# Patient Record
Sex: FEMALE | Race: WHITE | Employment: OTHER | URBAN - NONMETROPOLITAN AREA
[De-identification: names, ages, dates, MRNs, and addresses within clinical notes are randomized per-mention and may not be internally consistent; named-entity substitution may affect disease eponyms.]

---

## 2017-03-27 ENCOUNTER — TELEPHONE (OUTPATIENT)
Dept: CARDIOLOGY | Age: 82
End: 2017-03-27

## 2017-03-27 DIAGNOSIS — Z98.61 S/P PTCA (PERCUTANEOUS TRANSLUMINAL CORONARY ANGIOPLASTY): Primary | ICD-10-CM

## 2017-03-27 DIAGNOSIS — Z98.890 STATUS POST TYMPANOPLASTY: ICD-10-CM

## 2017-03-27 DIAGNOSIS — E78.00 PURE HYPERCHOLESTEROLEMIA: ICD-10-CM

## 2017-04-04 ENCOUNTER — OFFICE VISIT (OUTPATIENT)
Dept: CARDIOLOGY | Age: 82
End: 2017-04-04

## 2017-04-04 VITALS
DIASTOLIC BLOOD PRESSURE: 58 MMHG | HEART RATE: 66 BPM | SYSTOLIC BLOOD PRESSURE: 104 MMHG | WEIGHT: 130 LBS | BODY MASS INDEX: 23.92 KG/M2 | HEIGHT: 62 IN

## 2017-04-04 DIAGNOSIS — E78.00 PURE HYPERCHOLESTEROLEMIA: ICD-10-CM

## 2017-04-04 DIAGNOSIS — I10 ESSENTIAL HYPERTENSION: ICD-10-CM

## 2017-04-04 DIAGNOSIS — Z98.61 S/P PTCA (PERCUTANEOUS TRANSLUMINAL CORONARY ANGIOPLASTY): Primary | ICD-10-CM

## 2017-04-04 PROCEDURE — 99213 OFFICE O/P EST LOW 20 MIN: CPT | Performed by: INTERNAL MEDICINE

## 2017-04-04 RX ORDER — UBIDECARENONE 200 MG
CAPSULE ORAL 2 TIMES DAILY
COMMUNITY
End: 2020-05-20

## 2017-04-26 ENCOUNTER — OFFICE VISIT (OUTPATIENT)
Dept: AUDIOLOGY | Age: 82
End: 2017-04-26

## 2017-04-26 DIAGNOSIS — H90.3 SENSORINEURAL HEARING LOSS, BILATERAL: Primary | ICD-10-CM

## 2017-08-22 ENCOUNTER — OFFICE VISIT (OUTPATIENT)
Dept: CARDIOLOGY CLINIC | Age: 82
End: 2017-08-22
Payer: MEDICARE

## 2017-08-22 VITALS
HEIGHT: 62 IN | SYSTOLIC BLOOD PRESSURE: 128 MMHG | WEIGHT: 129 LBS | DIASTOLIC BLOOD PRESSURE: 60 MMHG | HEART RATE: 70 BPM | BODY MASS INDEX: 23.74 KG/M2

## 2017-08-22 DIAGNOSIS — I25.10 CAD IN NATIVE ARTERY: ICD-10-CM

## 2017-08-22 DIAGNOSIS — Z01.810 PRE-OPERATIVE CARDIOVASCULAR EXAMINATION: ICD-10-CM

## 2017-08-22 DIAGNOSIS — E78.00 PURE HYPERCHOLESTEROLEMIA: Primary | ICD-10-CM

## 2017-08-22 DIAGNOSIS — I10 ESSENTIAL HYPERTENSION: ICD-10-CM

## 2017-08-22 PROCEDURE — 99214 OFFICE O/P EST MOD 30 MIN: CPT | Performed by: INTERNAL MEDICINE

## 2017-08-22 PROCEDURE — 93000 ELECTROCARDIOGRAM COMPLETE: CPT | Performed by: INTERNAL MEDICINE

## 2017-08-22 RX ORDER — CLOPIDOGREL BISULFATE 75 MG/1
75 TABLET ORAL DAILY
Qty: 90 TABLET | Refills: 3 | Status: SHIPPED | OUTPATIENT
Start: 2017-08-22 | End: 2018-02-20 | Stop reason: SDUPTHER

## 2017-08-22 RX ORDER — PANTOPRAZOLE SODIUM 40 MG/1
TABLET, DELAYED RELEASE ORAL
Qty: 90 TABLET | Refills: 3 | Status: SHIPPED | OUTPATIENT
Start: 2017-08-22 | End: 2018-02-20 | Stop reason: SDUPTHER

## 2017-08-22 RX ORDER — ISOSORBIDE MONONITRATE 30 MG/1
30 TABLET, EXTENDED RELEASE ORAL DAILY
Qty: 90 TABLET | Refills: 3 | Status: SHIPPED | OUTPATIENT
Start: 2017-08-22 | End: 2018-02-20 | Stop reason: SDUPTHER

## 2017-08-22 RX ORDER — METOPROLOL SUCCINATE 25 MG/1
TABLET, EXTENDED RELEASE ORAL
Qty: 130 TABLET | Refills: 3 | Status: SHIPPED | OUTPATIENT
Start: 2017-08-22 | End: 2018-02-20 | Stop reason: SDUPTHER

## 2017-08-22 RX ORDER — LOSARTAN POTASSIUM 50 MG/1
50 TABLET ORAL DAILY
Qty: 90 TABLET | Refills: 3 | Status: SHIPPED | OUTPATIENT
Start: 2017-08-22 | End: 2018-02-20 | Stop reason: SDUPTHER

## 2018-02-01 ENCOUNTER — HOSPITAL ENCOUNTER (OUTPATIENT)
Dept: AUDIOLOGY | Age: 83
Discharge: HOME | End: 2018-02-01

## 2018-02-01 PROCEDURE — 92593 HC HEARING AID CHECK, BOTH EARS: CPT | Performed by: AUDIOLOGIST

## 2018-02-20 ENCOUNTER — OFFICE VISIT (OUTPATIENT)
Dept: CARDIOLOGY CLINIC | Age: 83
End: 2018-02-20
Payer: MEDICARE

## 2018-02-20 VITALS
WEIGHT: 136 LBS | HEART RATE: 64 BPM | HEIGHT: 62 IN | DIASTOLIC BLOOD PRESSURE: 82 MMHG | BODY MASS INDEX: 25.03 KG/M2 | SYSTOLIC BLOOD PRESSURE: 148 MMHG

## 2018-02-20 DIAGNOSIS — Z98.61 S/P PTCA (PERCUTANEOUS TRANSLUMINAL CORONARY ANGIOPLASTY): ICD-10-CM

## 2018-02-20 PROCEDURE — 99213 OFFICE O/P EST LOW 20 MIN: CPT | Performed by: INTERNAL MEDICINE

## 2018-02-20 RX ORDER — LOSARTAN POTASSIUM 50 MG/1
50 TABLET ORAL DAILY
Qty: 90 TABLET | Refills: 3 | Status: SHIPPED | OUTPATIENT
Start: 2018-02-20 | End: 2019-03-06 | Stop reason: SDUPTHER

## 2018-02-20 RX ORDER — ISOSORBIDE MONONITRATE 30 MG/1
30 TABLET, EXTENDED RELEASE ORAL DAILY
Qty: 90 TABLET | Refills: 3 | Status: SHIPPED | OUTPATIENT
Start: 2018-02-20 | End: 2019-03-06 | Stop reason: SDUPTHER

## 2018-02-20 RX ORDER — PANTOPRAZOLE SODIUM 40 MG/1
TABLET, DELAYED RELEASE ORAL
Qty: 90 TABLET | Refills: 3 | Status: SHIPPED | OUTPATIENT
Start: 2018-02-20 | End: 2019-03-06 | Stop reason: SDUPTHER

## 2018-02-20 RX ORDER — CLOPIDOGREL BISULFATE 75 MG/1
75 TABLET ORAL DAILY
Qty: 90 TABLET | Refills: 3 | Status: SHIPPED | OUTPATIENT
Start: 2018-02-20 | End: 2019-03-06 | Stop reason: SDUPTHER

## 2018-02-20 RX ORDER — METOPROLOL SUCCINATE 25 MG/1
TABLET, EXTENDED RELEASE ORAL
Qty: 135 TABLET | Refills: 3 | Status: SHIPPED | OUTPATIENT
Start: 2018-02-20 | End: 2019-03-06 | Stop reason: SDUPTHER

## 2018-02-20 RX ORDER — NITROGLYCERIN 0.4 MG/1
0.4 TABLET SUBLINGUAL EVERY 5 MIN PRN
Qty: 25 TABLET | Refills: 3 | Status: SHIPPED | OUTPATIENT
Start: 2018-02-20

## 2018-02-20 RX ORDER — GABAPENTIN 100 MG/1
400 CAPSULE ORAL 3 TIMES DAILY
COMMUNITY
End: 2021-01-27

## 2018-02-20 NOTE — PROGRESS NOTES
Current Outpatient Prescriptions   Medication Sig Dispense Refill    gabapentin (NEURONTIN) 100 MG capsule Take 300 mg by mouth 3 times daily.  isosorbide mononitrate (IMDUR) 30 MG extended release tablet Take 1 tablet by mouth daily 90 tablet 3    losartan (COZAAR) 50 MG tablet Take 1 tablet by mouth daily 90 tablet 3    metoprolol succinate (TOPROL XL) 25 MG extended release tablet TAKE 1.5 TABLETs BY MOUTH DAILY 130 tablet 3    pantoprazole (PROTONIX) 40 MG tablet TAKE 1 TABLET BY MOUTH DAILY 90 tablet 3    clopidogrel (PLAVIX) 75 MG tablet Take 1 tablet by mouth daily 90 tablet 3    Coenzyme Q10 200 MG CAPS Take by mouth 2 times daily      Calcium Citrate-Vitamin D (CALCIUM CITRATE + PO) Take 600 mg by mouth 2 times daily      pitavastatin (LIVALO) 2 MG TABS tablet Take 1 tablet by mouth nightly (Patient taking differently: Take 1 mg by mouth nightly ) 90 tablet 3    nitroGLYCERIN (NITROSTAT) 0.4 MG SL tablet Place 1 tablet under the tongue every 5 minutes as needed for Chest pain 25 tablet 11    Glucosamine Sulfate 1000 MG TABS Take by mouth 2 times daily       Methylsulfonylmethane (MSM) 1000 MG TABS Take by mouth      aspirin 81 MG chewable tablet Take 1 tablet by mouth daily. 30 tablet 3    latanoprost (XALATAN) 0.005 % ophthalmic solution Place 1 drop into both eyes nightly.  timolol (BETIMOL) 0.5 % ophthalmic solution Place 1 drop into both eyes 2 times daily.  Multiple Vitamins-Minerals (OCUVITE) TABS oral tablet Take 1 tablet by mouth daily.  Flaxseed, Linseed, (FLAX SEED OIL) 1000 MG CAPS Take 1 capsule by mouth 2 times daily.  Nutritional Supplements (NEURO-PLUS) CAPS Take 2 capsules by mouth 2 times daily. No current facility-administered medications for this visit.         REVIEW OF SYSTEM  Constitutional  symptoms such as fever chills and malaise and weakness  Are negative   HE ENT negative  HEART all negative  LUNGS all negative   ABDOMEN all PM

## 2018-08-06 ENCOUNTER — OFFICE VISIT (OUTPATIENT)
Dept: PHYSICAL MEDICINE AND REHAB | Age: 83
End: 2018-08-06
Payer: MEDICARE

## 2018-08-06 VITALS
DIASTOLIC BLOOD PRESSURE: 88 MMHG | HEART RATE: 76 BPM | BODY MASS INDEX: 25.03 KG/M2 | SYSTOLIC BLOOD PRESSURE: 185 MMHG | HEIGHT: 62 IN | WEIGHT: 136 LBS

## 2018-08-06 DIAGNOSIS — M46.1 SACROILIAC INFLAMMATION (HCC): ICD-10-CM

## 2018-08-06 DIAGNOSIS — G89.29 CHRONIC PAIN OF LEFT KNEE: ICD-10-CM

## 2018-08-06 DIAGNOSIS — G89.4 CHRONIC PAIN SYNDROME: ICD-10-CM

## 2018-08-06 DIAGNOSIS — M17.11 PRIMARY OSTEOARTHRITIS OF RIGHT KNEE: ICD-10-CM

## 2018-08-06 DIAGNOSIS — M25.562 CHRONIC PAIN OF LEFT KNEE: ICD-10-CM

## 2018-08-06 DIAGNOSIS — M17.12 PRIMARY OSTEOARTHRITIS OF LEFT KNEE: Primary | ICD-10-CM

## 2018-08-06 PROCEDURE — 99203 OFFICE O/P NEW LOW 30 MIN: CPT | Performed by: NURSE PRACTITIONER

## 2018-08-06 ASSESSMENT — ENCOUNTER SYMPTOMS
COUGH: 0
COLOR CHANGE: 0
DIARRHEA: 0
BACK PAIN: 1
CONSTIPATION: 0
NAUSEA: 0
ABDOMINAL PAIN: 0
CHEST TIGHTNESS: 0
SHORTNESS OF BREATH: 0

## 2018-08-06 NOTE — PROGRESS NOTES
Buttock pain    HPI     Patient here today for new patient evaluation for bilateral buttock pain. Patient under the care previously of Dr. Courtney Lubin. Patient had has previous bilateral SI injection on 2/28/18 and 3/28/18 and received 90%+ pain relief with both of those injections. Patient states that since starting and increasing her gabapentin the pain in the low back and buttock has been well controlled    Patient states majority of her pain is in her left knee. Patient with injection in bilateral knees in 4/2018 that was helpful in the right knee and just a little at first in the left knee. Patient feels that most of the pain has returned into the left knee. Patient states that she has had the left knee pain for about a year. Patient with increase in pain with weightbearing, activity, walking longer distance, patient with avoidance of using that leg first with stairs. Left knee Xray from 3/2018 shows degenerative changes. Discussed with patient about Synvisc injection and genicular nerve blocks in the future. Patient feels that the \"pain is not too bad right now\" and doesn't wish to proceed with any injections.  states that he \"feels it is better to just wait at this time\". Discussed further refills of gabapentin can be taken care of by her PCP and if she was to need further injections or stronger medications then she can come back and see me. The patient is allergic to codeine; lisinopril; simvastatin; valtrex [valacyclovir hcl]; and sulfa antibiotics. 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 Bowel sounds are normal. She exhibits no distension. There is no tenderness. Musculoskeletal:        Right knee: She exhibits decreased range of motion. Tenderness found. Left knee: She exhibits decreased range of motion. Tenderness found. Thoracic back: She exhibits no tenderness. Lumbar back: She exhibits decreased range of motion and tenderness. Back:    Neurological: She is alert and oriented to person, place, and time. No cranial nerve deficit. Skin: Skin is warm and dry. She is not diaphoretic. Psychiatric: She has a normal mood and affect. Her behavior is normal. Judgment and thought content normal.     ALTON test: positive bilateral  Yeoman's test: positive bilateral  Gaenslen test: positive bilateral     Assessment:     1. Primary osteoarthritis of left knee    2. Primary osteoarthritis of right knee    3. Chronic pain of left knee    4. Sacroiliac inflammation ([facility] [address]    5. Chronic pain syndrome            Plan:      · Patient read and signed orientation and opioid agreement. · OARRS reviewed. · Discussed long term side effects of medications. · Discussed tolerance, dependency and addiction. · Patient told can not receive any pain medications from any other source. · Discussed with patient they may not be pain free. · No evidence of abuse, diversion or aberrant behavior. · Prescription Needs: No prescription pain medications at this time   · PCP can take over gabapentin RX if able. Patient can follow up with our office in 3 months IF NEEDED       Meds. Prescribed:   No orders of the defined types were placed in this encounter. Return in about 3 months (around 11/6/2018). Time spent with patient was 60 minutes more than 50% was spent  Counseling/coordinated the patient's care.     Electronically signed by GABRIELA Leonard CNP on 8/6/2018 at 1:51 PM

## 2018-11-05 ENCOUNTER — OFFICE VISIT (OUTPATIENT)
Dept: PHYSICAL MEDICINE AND REHAB | Age: 83
End: 2018-11-05
Payer: MEDICARE

## 2018-11-05 ENCOUNTER — TELEPHONE (OUTPATIENT)
Dept: PHYSICAL MEDICINE AND REHAB | Age: 83
End: 2018-11-05

## 2018-11-05 VITALS
SYSTOLIC BLOOD PRESSURE: 138 MMHG | BODY MASS INDEX: 25.03 KG/M2 | HEART RATE: 92 BPM | DIASTOLIC BLOOD PRESSURE: 88 MMHG | HEIGHT: 62 IN | WEIGHT: 136 LBS

## 2018-11-05 DIAGNOSIS — G89.4 CHRONIC PAIN SYNDROME: ICD-10-CM

## 2018-11-05 DIAGNOSIS — M17.12 PRIMARY OSTEOARTHRITIS OF LEFT KNEE: Primary | ICD-10-CM

## 2018-11-05 DIAGNOSIS — M17.11 PRIMARY OSTEOARTHRITIS OF RIGHT KNEE: ICD-10-CM

## 2018-11-05 DIAGNOSIS — M25.562 CHRONIC PAIN OF LEFT KNEE: ICD-10-CM

## 2018-11-05 DIAGNOSIS — G89.29 CHRONIC PAIN OF LEFT KNEE: ICD-10-CM

## 2018-11-05 DIAGNOSIS — M46.1 SACROILIAC INFLAMMATION (HCC): ICD-10-CM

## 2018-11-05 PROCEDURE — 99213 OFFICE O/P EST LOW 20 MIN: CPT | Performed by: NURSE PRACTITIONER

## 2018-11-05 ASSESSMENT — ENCOUNTER SYMPTOMS
COUGH: 0
CHEST TIGHTNESS: 0
ABDOMINAL PAIN: 0
BACK PAIN: 1
SHORTNESS OF BREATH: 0
COLOR CHANGE: 0
NAUSEA: 0
DIARRHEA: 0
CONSTIPATION: 0

## 2018-11-05 NOTE — PROGRESS NOTES
CAPS, Take by mouth 2 times daily, Disp: , Rfl:     Calcium Citrate-Vitamin D (CALCIUM CITRATE + PO), Take 600 mg by mouth 2 times daily, Disp: , Rfl:     Glucosamine Sulfate 1000 MG TABS, Take by mouth 2 times daily , Disp: , Rfl:     Methylsulfonylmethane (MSM) 1000 MG TABS, Take by mouth, Disp: , Rfl:     aspirin 81 MG chewable tablet, Take 1 tablet by mouth daily. , Disp: 30 tablet, Rfl: 3    latanoprost (XALATAN) 0.005 % ophthalmic solution, Place 1 drop into both eyes nightly., Disp: , Rfl:     timolol (BETIMOL) 0.5 % ophthalmic solution, Place 1 drop into both eyes 2 times daily. , Disp: , Rfl:     Multiple Vitamins-Minerals (OCUVITE) TABS oral tablet, Take 1 tablet by mouth daily. , Disp: , Rfl:     Flaxseed, Linseed, (FLAX SEED OIL) 1000 MG CAPS, Take 1 capsule by mouth 2 times daily. , Disp: , Rfl:     Subjective:      Review of Systems   Constitutional: Positive for activity change and fatigue. Negative for chills, diaphoresis and fever. HENT: Negative for congestion and nosebleeds. Respiratory: Negative for cough, chest tightness and shortness of breath. Cardiovascular: Negative for chest pain and palpitations. Gastrointestinal: Negative for abdominal pain, constipation, diarrhea and nausea. Musculoskeletal: Positive for arthralgias, back pain, gait problem and myalgias. Skin: Negative for color change, rash and wound. Neurological: Negative for numbness. Psychiatric/Behavioral: Positive for sleep disturbance. Objective:     Vitals:    11/05/18 1246   BP: 138/88   Pulse: 92   Weight: 136 lb (61.7 kg)   Height: 5' 2\" (1.575 m)       Physical Exam   Constitutional: She is oriented to person, place, and time. She appears well-developed and well-nourished. No distress. HENT:   Head: Normocephalic and atraumatic. Eyes: Right eye exhibits no discharge. Left eye exhibits no discharge. Neck: Normal range of motion. Neck supple. No thyromegaly present.    Pulmonary/Chest:

## 2018-11-14 ENCOUNTER — OFFICE VISIT (OUTPATIENT)
Dept: CARDIOLOGY CLINIC | Age: 83
End: 2018-11-14
Payer: MEDICARE

## 2018-11-14 ENCOUNTER — TELEPHONE (OUTPATIENT)
Dept: PHYSICAL MEDICINE AND REHAB | Age: 83
End: 2018-11-14

## 2018-11-14 VITALS
WEIGHT: 141.7 LBS | DIASTOLIC BLOOD PRESSURE: 78 MMHG | HEIGHT: 62 IN | SYSTOLIC BLOOD PRESSURE: 128 MMHG | BODY MASS INDEX: 26.07 KG/M2 | HEART RATE: 73 BPM

## 2018-11-14 DIAGNOSIS — I20.0 UNSTABLE ANGINA (HCC): Primary | ICD-10-CM

## 2018-11-14 DIAGNOSIS — I25.10 CORONARY ARTERY DISEASE INVOLVING NATIVE CORONARY ARTERY OF NATIVE HEART WITHOUT ANGINA PECTORIS: ICD-10-CM

## 2018-11-14 DIAGNOSIS — I10 ESSENTIAL HYPERTENSION: ICD-10-CM

## 2018-11-14 DIAGNOSIS — E78.01 FAMILIAL HYPERCHOLESTEROLEMIA: ICD-10-CM

## 2018-11-14 LAB
ANION GAP SERPL CALCULATED.3IONS-SCNC: 7 MMOL/L (ref 4–12)
BUN BLDV-MCNC: 16 MG/DL (ref 7–20)
CALCIUM SERPL-MCNC: 9 MG/DL (ref 8.8–10.5)
CHLORIDE BLD-SCNC: 95 MEQ/L (ref 101–111)
CO2: 30 MEQ/L (ref 21–32)
CREAT SERPL-MCNC: 0.42 MG/DL (ref 0.6–1.3)
CREATININE CLEARANCE: >60
GLUCOSE: 77 MG/DL (ref 70–110)
POTASSIUM SERPL-SCNC: 4.5 MEQ/L (ref 3.6–5)
SODIUM BLD-SCNC: 132 MEQ/L (ref 135–145)

## 2018-11-14 PROCEDURE — 99213 OFFICE O/P EST LOW 20 MIN: CPT | Performed by: NUCLEAR MEDICINE

## 2018-11-14 PROCEDURE — 93000 ELECTROCARDIOGRAM COMPLETE: CPT | Performed by: NUCLEAR MEDICINE

## 2018-11-14 RX ORDER — MIRTAZAPINE 15 MG/1
7.5 TABLET, FILM COATED ORAL NIGHTLY
COMMUNITY
End: 2021-05-19 | Stop reason: SDUPTHER

## 2018-11-14 RX ORDER — ATORVASTATIN CALCIUM 10 MG/1
10 TABLET, FILM COATED ORAL DAILY
Qty: 30 TABLET | Refills: 5 | Status: SHIPPED | OUTPATIENT
Start: 2018-11-14 | End: 2019-09-02 | Stop reason: SDUPTHER

## 2018-11-14 NOTE — PROGRESS NOTES
[address] CARDIOLOG[facility]  [address]  [address] [address]  Dept: [phone]  Dept Fax: [phone]  Loc: [phone]    Visit Date: 11/14/2018    Crystal Modi is a 80 y.o. 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 2/2/2015 2/2/2015: Stenting mid-LAD using Xience Alpine 2.75 X 28 mm, post-dilated to 3.0 mm distally and 3.18 mm proximally.  Dr. Nigel Hernandez      Past Surgical History:   Procedure Laterality Date    [address] [facility]    COLONOSCOPY  7/19/05    EYE SURGERY  2009    left corneal transplant    EYE SURGERY  2008    right corneal transplant    EYE SURGERY      bilateral cataract   [address]    hysterectomy    MIDDLE EAR SURGERY  2005    right tympnaoplasty by dr [place] Right 5-3-13     Family History   Problem Relation Age of Onset    Arthritis Mother     Heart Disease Father     Heart Disease Sister     Heart Disease Brother     Heart Disease Sister     Heart Disease Sister      Social History   Substance Use Topics    Smoking status: Never Smoker    Smokeless tobacco: Never Used    Alcohol use No      Current Outpatient Prescriptions   Medication Sig Dispense Refill    mirtazapine (REMERON) 15 MG tablet Take 7.5 mg by mouth nightly      gabapentin (NEURONTIN) 100 MG capsule Take 300 mg by mouth 3 times daily.  isosorbide mononitrate (IMDUR) 30 MG extended release tablet Take 1 tablet by mouth daily 90 tablet 3    losartan (COZAAR) 50 MG tablet Take 1 tablet by mouth daily 90 tablet 3    metoprolol succinate (TOPROL XL) 25 MG extended release tablet TAKE 1.5 TABLETs BY MOUTH DAILY 135 tablet 3    pantoprazole (PROTONIX) 40 MG tablet TAKE 1 TABLET BY MOUTH DAILY 90 tablet 3    clopidogrel (PLAVIX) 75 MG tablet Take 1 tablet by mouth daily 90 tablet 3    nitroGLYCERIN (NITROSTAT) 0.4 MG SL tablet Place 1 tablet under the tongue every 5 minutes as needed for Chest pain 25 tablet 3    pitavastatin (LIVALO) 2 MG TABS tablet Take 0.5 tablets by mouth nightly 90 tablet 3    Coenzyme Q10 200 MG CAPS Take by mouth 2 times daily      Calcium Citrate-Vitamin D (CALCIUM CITRATE + PO) Take 600 mg by mouth 2 times daily      Glucosamine Sulfate 1000 MG TABS Take by mouth 2 times daily       Methylsulfonylmethane (MSM) 1000 MG TABS Take by mouth      aspirin 81 MG chewable tablet Take 1 tablet by mouth daily. 30 tablet 3    latanoprost (XALATAN) 0.005 % ophthalmic solution Place 1 drop into both eyes nightly.  timolol (BETIMOL) 0.5 % ophthalmic solution Place 1 drop into both eyes 2 times daily.  Multiple Vitamins-Minerals (OCUVITE) TABS oral tablet Take 1 tablet by mouth daily.  Flaxseed, Linseed, (FLAX SEED OIL) 1000 MG CAPS Take 1 capsule by mouth 2 times daily. No current facility-administered medications for this visit.       Allergies   Allergen Reactions    Codeine     Lisinopril     Simvastatin      Muscle aches    Valtrex [Valacyclovir Hcl]     Sulfa Antibiotics Rash     Health Maintenance   Topic Date Due    DTaP/Tdap/Td vaccine (1 - Tdap)

## 2018-11-14 NOTE — PROGRESS NOTES
Pre-op clearance for knee injection with Dr Orlin Benjamin  Wants to hold the plavix and ASA 5 days. EKG done today. Livalo not covered by insurance next year. Denies cp, palpitations. C/o sob with activity, dizziness and ADRIANNA.

## 2018-11-16 ENCOUNTER — TELEPHONE (OUTPATIENT)
Dept: CARDIOLOGY CLINIC | Age: 83
End: 2018-11-16

## 2018-12-10 ENCOUNTER — PREP FOR PROCEDURE (OUTPATIENT)
Dept: PHYSICAL MEDICINE AND REHAB | Age: 83
End: 2018-12-10

## 2018-12-10 NOTE — H&P
Terese Rivera is a 80 y.o. female is here today for     Chief Complaint: Left knee pain           The patientis allergic to codeine; lisinopril; simvastatin; valtrex [valacyclovir hcl]; and sulfa antibiotics. 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 .     Past Surgical History  The patient  has a past surgical history that includes Colon surgery (1994); Colonoscopy (7/19/05); eye surgery (2009); eye surgery (2008); eye surgery; Middle ear surgery (2005); Appendectomy (1940); Hysterectomy (1969); and Tympanoplasty (Right, 5-3-13). Family History  This patient's family history includes Arthritis in her mother; Heart Disease in her brother, father, sister, sister, and sister. Social History  Francesco Lucas  reports that she has never smoked. She has never used smokeless tobacco. She reports that she does not drink alcohol or use drugs. Medications    Current Medication      Current Outpatient Prescriptions:     gabapentin (NEURONTIN) 100 MG capsule, Take 300 mg by mouth 3 times daily. , Disp: , Rfl:     isosorbide mononitrate (IMDUR) 30 MG extended release tablet, Take 1 tablet by mouth daily, Disp: 90 tablet, Rfl: 3    losartan (COZAAR) 50 MG tablet, Take 1 tablet by mouth daily, Disp: 90 tablet, Rfl: 3    metoprolol succinate (TOPROL XL) 25 MG extended release tablet, TAKE 1.5 TABLETs BY MOUTH DAILY, Disp: 135 tablet, Rfl: 3    pantoprazole (PROTONIX) 40 MG tablet, TAKE 1 TABLET BY MOUTH DAILY, Disp: 90 tablet, Rfl:

## 2018-12-14 ENCOUNTER — ANESTHESIA EVENT (OUTPATIENT)
Dept: OPERATING ROOM | Age: 83
End: 2018-12-14
Payer: MEDICARE

## 2018-12-14 ENCOUNTER — HOSPITAL ENCOUNTER (OUTPATIENT)
Age: 83
Setting detail: OUTPATIENT SURGERY
Discharge: HOME | End: 2018-12-14
Attending: PAIN MEDICINE | Admitting: PAIN MEDICINE
Payer: MEDICARE

## 2018-12-14 ENCOUNTER — APPOINTMENT (OUTPATIENT)
Dept: GENERAL RADIOLOGY | Age: 83
End: 2018-12-14
Attending: PAIN MEDICINE
Payer: MEDICARE

## 2018-12-14 ENCOUNTER — ANESTHESIA (OUTPATIENT)
Dept: OPERATING ROOM | Age: 83
End: 2018-12-14
Payer: MEDICARE

## 2018-12-14 VITALS
HEIGHT: 62 IN | OXYGEN SATURATION: 96 % | SYSTOLIC BLOOD PRESSURE: 135 MMHG | BODY MASS INDEX: 25.21 KG/M2 | HEART RATE: 68 BPM | WEIGHT: 137 LBS | TEMPERATURE: 97 F | DIASTOLIC BLOOD PRESSURE: 64 MMHG | RESPIRATION RATE: 15 BRPM

## 2018-12-14 VITALS
OXYGEN SATURATION: 98 % | RESPIRATION RATE: 13 BRPM | DIASTOLIC BLOOD PRESSURE: 81 MMHG | SYSTOLIC BLOOD PRESSURE: 204 MMHG

## 2018-12-14 PROCEDURE — 20610 DRAIN/INJ JOINT/BURSA W/O US: CPT | Performed by: PAIN MEDICINE

## 2018-12-14 PROCEDURE — 2500000003 HC RX 250 WO HCPCS: Performed by: PAIN MEDICINE

## 2018-12-14 PROCEDURE — 7100000011 HC PHASE II RECOVERY - ADDTL 15 MIN: Performed by: PAIN MEDICINE

## 2018-12-14 PROCEDURE — 7100000010 HC PHASE II RECOVERY - FIRST 15 MIN: Performed by: PAIN MEDICINE

## 2018-12-14 PROCEDURE — 6360000002 HC RX W HCPCS: Performed by: NURSE ANESTHETIST, CERTIFIED REGISTERED

## 2018-12-14 PROCEDURE — 3700000000 HC ANESTHESIA ATTENDED CARE: Performed by: PAIN MEDICINE

## 2018-12-14 PROCEDURE — 3600000052 HC PAIN LEVEL 2 BASE: Performed by: PAIN MEDICINE

## 2018-12-14 PROCEDURE — 2709999900 HC NON-CHARGEABLE SUPPLY: Performed by: PAIN MEDICINE

## 2018-12-14 PROCEDURE — 3209999900 FLUORO FOR SURGICAL PROCEDURES

## 2018-12-14 PROCEDURE — 77002 NEEDLE LOCALIZATION BY XRAY: CPT | Performed by: PAIN MEDICINE

## 2018-12-14 RX ORDER — LIDOCAINE HYDROCHLORIDE 10 MG/ML
INJECTION, SOLUTION INFILTRATION; PERINEURAL PRN
Status: DISCONTINUED | OUTPATIENT
Start: 2018-12-14 | End: 2018-12-14 | Stop reason: HOSPADM

## 2018-12-14 RX ORDER — PROPOFOL 10 MG/ML
INJECTION, EMULSION INTRAVENOUS PRN
Status: DISCONTINUED | OUTPATIENT
Start: 2018-12-14 | End: 2018-12-14 | Stop reason: SDUPTHER

## 2018-12-14 RX ADMIN — PROPOFOL 30 MG: 10 INJECTION, EMULSION INTRAVENOUS at 08:23

## 2018-12-14 ASSESSMENT — PAIN SCALES - GENERAL: PAINLEVEL_OUTOF10: 0

## 2018-12-14 ASSESSMENT — PAIN - FUNCTIONAL ASSESSMENT: PAIN_FUNCTIONAL_ASSESSMENT: 0-10

## 2018-12-14 NOTE — ANESTHESIA PRE PROCEDURE
2/20/18   Marixa Matthew, DO   nitroGLYCERIN (NITROSTAT) 0.4 MG SL tablet Place 1 tablet under the tongue every 5 minutes as needed for Chest pain 2/20/18   Marixa Matthew, DO   latanoprost (XALATAN) 0.005 % ophthalmic solution Place 1 drop into both eyes nightly. Historical Provider, MD   timolol (BETIMOL) 0.5 % ophthalmic solution Place 1 drop into both eyes 2 times daily. Historical Provider, MD       Current medications:    No current facility-administered medications for this encounter. Allergies: Allergies   Allergen Reactions    Codeine     Lisinopril     Simvastatin      Muscle aches    Valtrex [Valacyclovir Hcl]     Sulfa Antibiotics Rash       Problem List:    Patient Active Problem List   Diagnosis Code    Hyperlipidemia E78.5    Degenerative joint disease M19.90    Perforation of right tympanic membrane H72.91    Tinnitus, left H93.12    VMR (vasomotor rhinitis) J30.0    Status post tympanoplasty Z98.890    Tinnitus of both ears H93.13    Unstable angina (HCC) I20.0    HTN (hypertension) I10    S/P PTCA: 2/2/2015: Stenting mid-LAD using Xience Alpine 2.75 X 28 mm, post-dilated to 3.0 mm distally and 3.18 mm proximally. 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 2/2/2015 2/2/2015: Stenting mid-LAD using Xience Alpine 2.75 X 28 mm, post-dilated to 3.0 mm distally and 3.18 mm proximally.  Dr. Firman Kanner       Past Surgical History:

## 2018-12-14 NOTE — ANESTHESIA POSTPROCEDURE EVALUATION
Department of Anesthesiology  Postprocedure Note    Patient: Caroline Trejo  MRN: [id number]  YOB: 1928  Date of evaluation: 12/14/2018  Time:  11:02 AM     Procedure Summary     Date:  12/14/18 Room / Location:  [facility] 03 / [address]    Anesthesia Start:  2587 Anesthesia Stop:  2080    Procedure:  Synvisc ONE LEFT knee (Left ) Diagnosis:  (osteoporosis)    Surgeon:  Ruth Lopez MD Responsible Provider:  Chen Erwin MD    Anesthesia Type:  MAC ASA Status:  4          Anesthesia Type: MAC    Mireille Phase I:      Mireille Phase II:      Last vitals: Reviewed and per EMR flowsheets. Anesthesia Post Evaluation   [address] [facility]  POST-ANESTHESIA NOTE       Name:  Caroline Trejo                                         Age:  80 y.o.   MRN:  [id number]      Last Vitals:  /64   Pulse 68   Temp 97 °F (36.1 °C) (Temporal)   Resp 15   Ht 5' 2\" (1.575 m)   Wt 137 lb (62.1 kg)   SpO2 96%   BMI 25.06 kg/m²   Patient Vitals for the past 4 hrs:   BP Temp Temp src Pulse Resp SpO2 Height Weight   12/14/18 0840 135/64 97 °F (36.1 °C) Temporal 68 15 - - -   12/14/18 0735 (!) 180/79 - - - - - - -   12/14/18 0734 (!) 202/86 97.6 °F (36.4 °C) Temporal 70 16 96 % 5' 2\" (1.575 m) 137 lb (62.1 kg)       Level of Consciousness:  Awake    Respiratory:  Stable    Oxygen Saturation:  Stable    Cardiovascular:  Stable    Hydration:  Adequate    PONV:  Stable    Post-op Pain:  Adequate analgesia    Post-op Assessment:  No apparent anesthetic complications    Additional Follow-Up / Treatment / Comment:  None    Chen Erwin MD  December 14, 2018   11:02 AM

## 2019-01-15 ENCOUNTER — OFFICE VISIT (OUTPATIENT)
Dept: PHYSICAL MEDICINE AND REHAB | Age: 84
End: 2019-01-15
Payer: MEDICARE

## 2019-01-15 VITALS
HEART RATE: 68 BPM | BODY MASS INDEX: 25.21 KG/M2 | WEIGHT: 137 LBS | DIASTOLIC BLOOD PRESSURE: 74 MMHG | HEIGHT: 62 IN | SYSTOLIC BLOOD PRESSURE: 170 MMHG

## 2019-01-15 DIAGNOSIS — G89.4 CHRONIC PAIN SYNDROME: ICD-10-CM

## 2019-01-15 DIAGNOSIS — M17.12 PRIMARY OSTEOARTHRITIS OF LEFT KNEE: Primary | ICD-10-CM

## 2019-01-15 DIAGNOSIS — M17.11 PRIMARY OSTEOARTHRITIS OF RIGHT KNEE: ICD-10-CM

## 2019-01-15 DIAGNOSIS — M25.562 CHRONIC PAIN OF LEFT KNEE: ICD-10-CM

## 2019-01-15 DIAGNOSIS — G89.29 CHRONIC PAIN OF LEFT KNEE: ICD-10-CM

## 2019-01-15 DIAGNOSIS — M46.1 SACROILIAC INFLAMMATION (HCC): ICD-10-CM

## 2019-01-15 PROCEDURE — 99213 OFFICE O/P EST LOW 20 MIN: CPT | Performed by: NURSE PRACTITIONER

## 2019-01-15 ASSESSMENT — ENCOUNTER SYMPTOMS
DIARRHEA: 0
SHORTNESS OF BREATH: 0
CONSTIPATION: 0
CHEST TIGHTNESS: 0
ABDOMINAL PAIN: 0
COUGH: 0
NAUSEA: 0
COLOR CHANGE: 0
BACK PAIN: 1

## 2019-02-11 ENCOUNTER — HOSPITAL ENCOUNTER (OUTPATIENT)
Dept: AUDIOLOGY | Age: 84
Discharge: HOME | End: 2019-02-11

## 2019-02-11 PROCEDURE — 92592 HC HEARING AID CHECK, ONE EAR: CPT | Performed by: AUDIOLOGIST

## 2019-02-13 ENCOUNTER — TELEPHONE (OUTPATIENT)
Dept: PHYSICAL MEDICINE AND REHAB | Age: 84
End: 2019-02-13

## 2019-03-06 RX ORDER — LOSARTAN POTASSIUM 50 MG/1
50 TABLET ORAL DAILY
Qty: 90 TABLET | Refills: 3 | Status: SHIPPED | OUTPATIENT
Start: 2019-03-06 | End: 2019-05-15 | Stop reason: SDUPTHER

## 2019-03-06 RX ORDER — METOPROLOL SUCCINATE 25 MG/1
TABLET, EXTENDED RELEASE ORAL
Qty: 135 TABLET | Refills: 3 | Status: SHIPPED | OUTPATIENT
Start: 2019-03-06 | End: 2019-05-15 | Stop reason: SDUPTHER

## 2019-03-06 RX ORDER — PANTOPRAZOLE SODIUM 40 MG/1
TABLET, DELAYED RELEASE ORAL
Qty: 90 TABLET | Refills: 3 | Status: SHIPPED | OUTPATIENT
Start: 2019-03-06 | End: 2019-05-15 | Stop reason: SDUPTHER

## 2019-03-06 RX ORDER — ISOSORBIDE MONONITRATE 30 MG/1
30 TABLET, EXTENDED RELEASE ORAL DAILY
Qty: 90 TABLET | Refills: 3 | Status: SHIPPED | OUTPATIENT
Start: 2019-03-06 | End: 2019-05-15 | Stop reason: SDUPTHER

## 2019-03-06 RX ORDER — CLOPIDOGREL BISULFATE 75 MG/1
75 TABLET ORAL DAILY
Qty: 90 TABLET | Refills: 3 | Status: SHIPPED | OUTPATIENT
Start: 2019-03-06 | End: 2019-05-15 | Stop reason: SDUPTHER

## 2019-05-15 ENCOUNTER — OFFICE VISIT (OUTPATIENT)
Dept: CARDIOLOGY CLINIC | Age: 84
End: 2019-05-15
Payer: MEDICARE

## 2019-05-15 VITALS
BODY MASS INDEX: 23.92 KG/M2 | HEIGHT: 63 IN | SYSTOLIC BLOOD PRESSURE: 158 MMHG | DIASTOLIC BLOOD PRESSURE: 80 MMHG | WEIGHT: 135 LBS | HEART RATE: 88 BPM

## 2019-05-15 DIAGNOSIS — I10 ESSENTIAL HYPERTENSION: ICD-10-CM

## 2019-05-15 DIAGNOSIS — I25.10 CORONARY ARTERY DISEASE INVOLVING NATIVE CORONARY ARTERY OF NATIVE HEART WITHOUT ANGINA PECTORIS: Primary | ICD-10-CM

## 2019-05-15 DIAGNOSIS — E78.01 FAMILIAL HYPERCHOLESTEROLEMIA: ICD-10-CM

## 2019-05-15 PROCEDURE — 99213 OFFICE O/P EST LOW 20 MIN: CPT | Performed by: NUCLEAR MEDICINE

## 2019-05-15 RX ORDER — ISOSORBIDE MONONITRATE 30 MG/1
30 TABLET, EXTENDED RELEASE ORAL DAILY
Qty: 90 TABLET | Refills: 3 | Status: SHIPPED | OUTPATIENT
Start: 2019-05-15 | End: 2020-03-18

## 2019-05-15 RX ORDER — METOPROLOL SUCCINATE 25 MG/1
TABLET, EXTENDED RELEASE ORAL
Qty: 135 TABLET | Refills: 3 | Status: SHIPPED | OUTPATIENT
Start: 2019-05-15 | End: 2020-01-13

## 2019-05-15 RX ORDER — PANTOPRAZOLE SODIUM 40 MG/1
TABLET, DELAYED RELEASE ORAL
Qty: 90 TABLET | Refills: 3 | Status: SHIPPED | OUTPATIENT
Start: 2019-05-15 | End: 2020-04-13

## 2019-05-15 RX ORDER — LOSARTAN POTASSIUM 50 MG/1
50 TABLET ORAL DAILY
Qty: 90 TABLET | Refills: 3 | Status: SHIPPED | OUTPATIENT
Start: 2019-05-15 | End: 2020-03-25

## 2019-05-15 RX ORDER — CLOPIDOGREL BISULFATE 75 MG/1
75 TABLET ORAL DAILY
Qty: 90 TABLET | Refills: 3 | Status: SHIPPED | OUTPATIENT
Start: 2019-05-15 | End: 2020-01-20

## 2019-06-06 ENCOUNTER — OFFICE VISIT (OUTPATIENT)
Dept: PHYSICAL MEDICINE AND REHAB | Age: 84
End: 2019-06-06
Payer: MEDICARE

## 2019-06-06 VITALS
HEIGHT: 63 IN | HEART RATE: 72 BPM | BODY MASS INDEX: 23.92 KG/M2 | DIASTOLIC BLOOD PRESSURE: 85 MMHG | SYSTOLIC BLOOD PRESSURE: 149 MMHG | WEIGHT: 135 LBS

## 2019-06-06 DIAGNOSIS — G89.29 CHRONIC PAIN OF LEFT KNEE: ICD-10-CM

## 2019-06-06 DIAGNOSIS — G89.4 CHRONIC PAIN SYNDROME: ICD-10-CM

## 2019-06-06 DIAGNOSIS — M17.12 PRIMARY OSTEOARTHRITIS OF LEFT KNEE: ICD-10-CM

## 2019-06-06 DIAGNOSIS — M25.562 CHRONIC PAIN OF LEFT KNEE: ICD-10-CM

## 2019-06-06 DIAGNOSIS — M17.11 PRIMARY OSTEOARTHRITIS OF RIGHT KNEE: Primary | ICD-10-CM

## 2019-06-06 DIAGNOSIS — M46.1 SACROILIAC INFLAMMATION (HCC): ICD-10-CM

## 2019-06-06 DIAGNOSIS — S22.080S CLOSED WEDGE COMPRESSION FRACTURE OF ELEVENTH THORACIC VERTEBRA, SEQUELA: ICD-10-CM

## 2019-06-06 PROCEDURE — 99214 OFFICE O/P EST MOD 30 MIN: CPT | Performed by: NURSE PRACTITIONER

## 2019-06-06 ASSESSMENT — ENCOUNTER SYMPTOMS
CHEST TIGHTNESS: 0
DIARRHEA: 0
SHORTNESS OF BREATH: 0
COUGH: 0
NAUSEA: 0
BACK PAIN: 1
CONSTIPATION: 0
COLOR CHANGE: 0
ABDOMINAL PAIN: 0

## 2019-06-06 NOTE — PROGRESS NOTES
[address] [facility]  [address] [facility].  Dept: [phone]  Dept Fax: [phone]: [phone]    Visit Date: 6/6/2019    Functionality Assessment/Goals Worksheet     On a scale of 0 (Does not Interfere) to 10 (Completely Interferes)     1. Which number describes how during the past week pain has interfered with       the following:  A. General Activity:  5  B. Mood: 6  C. Walking Ability:  6  D. Normal Work (Includes both work outside the home and housework):  10  E. Relations with Other People:   6  F. Sleep:   2  G. Enjoyment of Life:   10    2. Patient Prefers to Take their Pain Medications:     [x]  On a regular basis   []  Only when necessary    []  Does not take pain medications    3. What are the Patient's Goals/Expectations for Visiting Pain Management? []  Learn about my pain    []  Receive Medication   []  Physical Therapy     []  Treat Depression   [x]  Receive Injections    []  Treat Sleep   []  Deal with Anxiety and Stress   []  Treat Opoid Dependence/Addiction   []  Other:      HPI:   Inez Guerrero is a 80 y.o. female is here today for    Chief Complaint: Knee pain , right lower back pain. HPI     Daughter present today. She states that since last visit patient has had 2 compression fractures. Only one was fixed due to \"no fluid on the other levels\". Daughter states these have helped a little bit, but patient continues to have increased pain. Will request information from [facility] on MRI report, images, and procedures. Daughter states that she is going over there today to see her dad and she will request these when she is there    left knee synvisc one injection on 12/14/18 still doing fairly well  right knee injection in 4/2018 still doing fairly well - denies need for injections  SI pain/gabapentin/compound cream - daughter not sure if they received cream, she will call us.   Rj Wilson working on titrating slowly, increase to 400mg TID has been helpful. Majority of pain patient is complaining of seem MS in nature in the right lower back - encouraged compound cream.     Medications reviewed. Pain scale with out pain medications or at its worst is 8/10. Pain scale with pain medications or at its best is denies/10. The patientis allergic to codeine; lisinopril; simvastatin; valtrex [valacyclovir hcl]; and sulfa antibiotics. 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 .    Past Surgical History  The patient  has a past surgical history that includes Colon surgery (1994); Colonoscopy (7/19/05); Middle ear surgery (2005); Appendectomy (1940); Hysterectomy (1969); Tympanoplasty (Right, 5-3-13); Cardiac catheterization (2015); eye surgery (2009); eye surgery (2008); eye surgery; Kyphosis surgery (2017); and Pain Block (Left, 12/14/2018). Family History  This patient's family history includes Arthritis in her mother; Heart Disease in her brother, father, sister, sister, and sister. Social History  Norman Acevedo  reports that she has never smoked. She has never used smokeless tobacco. She reports that she does not drink alcohol or use drugs.     Medications    Current Outpatient Medications:     losartan (COZAAR) 50 MG tablet, Take 1 tablet by mouth daily, Disp: 90 tablet, Rfl: 3    clopidogrel (PLAVIX) 75 MG tablet, Take 1 tablet by mouth daily, Disp: 90 tablet, Rfl: 3    isosorbide mononitrate (IMDUR) 30 MG extended release tablet, Take 1 tablet by mouth daily, Disp: 90 tablet, Rfl: 3    metoprolol succinate (TOPROL XL) 25 MG extended release tablet, TAKE 1.5 TABLETs BY MOUTH DAILY, Disp: 135 tablet, Rfl: 3    pantoprazole (PROTONIX) 40 MG tablet, TAKE 1 TABLET BY MOUTH DAILY, Disp: 90 tablet, Rfl: 3    mirtazapine (REMERON) 15 MG tablet, Take 7.5 mg by mouth nightly, Disp: , Rfl:     atorvastatin (LIPITOR) 10 MG tablet, Take 1 tablet by mouth daily, Disp: 30 tablet, Rfl: 5    gabapentin (NEURONTIN) 100 MG capsule, Take 400 mg by mouth 3 times daily. , Disp: , Rfl:     nitroGLYCERIN (NITROSTAT) 0.4 MG SL tablet, Place 1 tablet under the tongue every 5 minutes as needed for Chest pain, Disp: 25 tablet, Rfl: 3    Coenzyme Q10 200 MG CAPS, Take by mouth 2 times daily, Disp: , Rfl:     Calcium Citrate-Vitamin D (CALCIUM CITRATE + PO), Take 600 mg by mouth 2 times daily, Disp: , Rfl:     Glucosamine Sulfate 1000 MG TABS, Take by mouth 2 times daily , Disp: , Rfl:     Methylsulfonylmethane (MSM) 1000 MG TABS, Take by mouth, Disp: , Rfl:     aspirin 81 MG chewable tablet, Take 1 tablet by mouth daily. , Disp: 30 tablet, Rfl: 3    latanoprost (XALATAN) 0.005 % ophthalmic solution, Place 1 drop into both eyes nightly., Disp: , Rfl:     timolol (BETIMOL) 0.5 % ophthalmic solution, Place 1 drop into both eyes 2 times daily. , Disp: , Rfl:     Multiple Vitamins-Minerals (OCUVITE) TABS oral tablet, Take 1 tablet by mouth daily. , Disp: , Rfl:     Flaxseed, Linseed, (FLAX SEED OIL) 1000 MG CAPS, Take 1 capsule by mouth 2 times daily. , Disp: , Rfl:     Subjective:      Review of Systems   Constitutional: Positive for activity change and fatigue. Negative for chills, diaphoresis and fever. HENT: Negative for congestion and nosebleeds. Respiratory: Negative for cough, chest tightness and shortness of breath. Cardiovascular: Negative for chest pain and palpitations.    Gastrointestinal: Negative for abdominal pain, constipation, diarrhea and nausea. Musculoskeletal: Positive for arthralgias, back pain, gait problem and myalgias. Skin: Negative for color change, rash and wound. Neurological: Negative for numbness. Psychiatric/Behavioral: Positive for sleep disturbance. Objective:     Vitals:    06/06/19 1055   BP: (!) 162/80   Site: Right Upper Arm   Position: Sitting   Cuff Size: Medium Adult   Pulse: 74   Weight: 135 lb (61.2 kg)   Height: 5' 3\" (1.6 m)       Physical Exam   Constitutional: She is oriented to person, place, and time. She appears well-developed and well-nourished. No distress. HENT:   Head: Normocephalic and atraumatic. Right Ear: External ear normal.   Left Ear: External ear normal.   Eyes: Conjunctivae are normal. Right eye exhibits no discharge. Left eye exhibits no discharge. Neck: Normal range of motion. Neck supple. No thyromegaly present. Pulmonary/Chest: Effort normal. No respiratory distress. Musculoskeletal: She exhibits tenderness. She exhibits no edema. Right knee: She exhibits decreased range of motion. Tenderness found. Left knee: She exhibits decreased range of motion. Tenderness found. Thoracic back: She exhibits no tenderness. Lumbar back: She exhibits decreased range of motion and tenderness. Back:         Legs:  Neurological: She is alert and oriented to person, place, and time. No cranial nerve deficit. Skin: Skin is warm and dry. She is not diaphoretic. Psychiatric: She has a normal mood and affect. Her behavior is normal. Judgment and thought content normal.          Assessment:     1. Primary osteoarthritis of right knee    2. Chronic pain of left knee    3. Primary osteoarthritis of left knee    4. Sacroiliac inflammation ([facility] [address]    5. Chronic pain syndrome    6. Closed wedge compression fracture of eleventh thoracic vertebra, sequela            Plan:      · OARRS reviewed.  Current MED: 0  · Patient was not offered naloxone for home. · Discussed long term side effects of medications, tolerance, dependency and addiction. · Patient told can not receive any pain medications from any other source. · No evidence of abuse, diversion or aberrant behavior.  Medications and/or procedures to improve function and quality of life- patient understanding with this and that may not be pain free   Discussed with patient about safe storage of medications at home   Discussed possible weaning of medication dosing dependent on treatment/procedure results.  Testing: none   Procedures: await MRI records from [facility]   Discussed with patient about risks with procedure including infection, reaction to medication, increased pain, or bleeding.  Medications: compound cream - avoiding oral medications due to mentation and age.  Will review imaging from [facility] if anything else we can do. Meds. Prescribed:   No orders of the defined types were placed in this encounter. Return if symptoms worsen or fail to improve. Electronically signed by GABRIELA Cohen CNP on6/6/2019 at 11:31 AM      Discussed further with Dr Breanne Rivas. Recommendation of L-facet RIGHT l3-4, l4-5. This was discussed by phone with daughter Jamin Palmer, she will discuss with her parents and let us know if they plan to proceed.      Electronically signed by GABRIELA Cohen CNP on 6/7/2019 at 12:47 PM

## 2019-09-03 RX ORDER — ATORVASTATIN CALCIUM 10 MG/1
TABLET, FILM COATED ORAL
Qty: 90 TABLET | Refills: 3 | Status: SHIPPED | OUTPATIENT
Start: 2019-09-03 | End: 2020-10-09

## 2020-01-13 RX ORDER — METOPROLOL SUCCINATE 25 MG/1
TABLET, EXTENDED RELEASE ORAL
Qty: 135 TABLET | Refills: 0 | Status: SHIPPED | OUTPATIENT
Start: 2020-01-13 | End: 2020-04-13

## 2020-01-20 RX ORDER — CLOPIDOGREL BISULFATE 75 MG/1
TABLET ORAL
Qty: 90 TABLET | Refills: 1 | Status: SHIPPED | OUTPATIENT
Start: 2020-01-20 | End: 2020-10-09

## 2020-03-18 RX ORDER — ISOSORBIDE MONONITRATE 30 MG/1
TABLET, EXTENDED RELEASE ORAL
Qty: 90 TABLET | Refills: 0 | Status: SHIPPED | OUTPATIENT
Start: 2020-03-18 | End: 2020-05-20 | Stop reason: ALTCHOICE

## 2020-03-25 RX ORDER — LOSARTAN POTASSIUM 50 MG/1
TABLET ORAL
Qty: 90 TABLET | Refills: 0 | Status: SHIPPED | OUTPATIENT
Start: 2020-03-25 | End: 2020-06-22

## 2020-04-13 RX ORDER — PANTOPRAZOLE SODIUM 40 MG/1
TABLET, DELAYED RELEASE ORAL
Qty: 90 TABLET | Refills: 0 | Status: SHIPPED | OUTPATIENT
Start: 2020-04-13 | End: 2020-07-16

## 2020-04-13 RX ORDER — METOPROLOL SUCCINATE 25 MG/1
TABLET, EXTENDED RELEASE ORAL
Qty: 135 TABLET | Refills: 0 | Status: SHIPPED | OUTPATIENT
Start: 2020-04-13 | End: 2020-10-09

## 2020-05-05 ENCOUNTER — TELEPHONE (OUTPATIENT)
Dept: PHYSICAL MEDICINE AND REHAB | Age: 85
End: 2020-05-05

## 2020-05-20 ENCOUNTER — HOSPITAL ENCOUNTER (OUTPATIENT)
Age: 85
Discharge: HOME | End: 2020-05-20
Payer: MEDICARE

## 2020-05-20 ENCOUNTER — OFFICE VISIT (OUTPATIENT)
Dept: CARDIOLOGY CLINIC | Age: 85
End: 2020-05-20
Payer: MEDICARE

## 2020-05-20 VITALS
WEIGHT: 131.8 LBS | SYSTOLIC BLOOD PRESSURE: 138 MMHG | BODY MASS INDEX: 27.66 KG/M2 | HEART RATE: 67 BPM | HEIGHT: 58 IN | DIASTOLIC BLOOD PRESSURE: 80 MMHG

## 2020-05-20 LAB
ANION GAP SERPL CALCULATED.3IONS-SCNC: 10 MEQ/L (ref 8–16)
BUN BLDV-MCNC: 9 MG/DL (ref 7–22)
CALCIUM SERPL-MCNC: 9.6 MG/DL (ref 8.5–10.5)
CHLORIDE BLD-SCNC: 97 MEQ/L (ref 98–111)
CO2: 29 MEQ/L (ref 23–33)
CREAT SERPL-MCNC: 0.4 MG/DL (ref 0.4–1.2)
ERYTHROCYTE [DISTWIDTH] IN BLOOD BY AUTOMATED COUNT: 13 % (ref 11.5–14.5)
ERYTHROCYTE [DISTWIDTH] IN BLOOD BY AUTOMATED COUNT: 46.2 FL (ref 35–45)
GFR SERPL CREATININE-BSD FRML MDRD: > 90 ML/MIN/1.73M2
GLUCOSE BLD-MCNC: 107 MG/DL (ref 70–108)
HCT VFR BLD CALC: 39.1 % (ref 37–47)
HEMOGLOBIN: 12.5 GM/DL (ref 12–16)
MCH RBC QN AUTO: 31.3 PG (ref 26–33)
MCHC RBC AUTO-ENTMCNC: 32 GM/DL (ref 32.2–35.5)
MCV RBC AUTO: 98 FL (ref 81–99)
PLATELET # BLD: 210 THOU/MM3 (ref 130–400)
PMV BLD AUTO: 9.8 FL (ref 9.4–12.4)
POTASSIUM SERPL-SCNC: 4.1 MEQ/L (ref 3.5–5.2)
RBC # BLD: 3.99 MILL/MM3 (ref 4.2–5.4)
SODIUM BLD-SCNC: 136 MEQ/L (ref 135–145)
TSH SERPL DL<=0.05 MIU/L-ACNC: 0.88 UIU/ML (ref 0.4–4.2)
WBC # BLD: 7.3 THOU/MM3 (ref 4.8–10.8)

## 2020-05-20 PROCEDURE — 99213 OFFICE O/P EST LOW 20 MIN: CPT | Performed by: NUCLEAR MEDICINE

## 2020-05-20 PROCEDURE — 84443 ASSAY THYROID STIM HORMONE: CPT

## 2020-05-20 PROCEDURE — 80048 BASIC METABOLIC PNL TOTAL CA: CPT

## 2020-05-20 PROCEDURE — 36415 COLL VENOUS BLD VENIPUNCTURE: CPT

## 2020-05-20 PROCEDURE — 85027 COMPLETE CBC AUTOMATED: CPT

## 2020-05-20 PROCEDURE — 93000 ELECTROCARDIOGRAM COMPLETE: CPT | Performed by: NUCLEAR MEDICINE

## 2020-05-20 RX ORDER — FAMOTIDINE 40 MG/1
40 TABLET, FILM COATED ORAL DAILY
COMMUNITY

## 2020-05-20 RX ORDER — SENNA AND DOCUSATE SODIUM 50; 8.6 MG/1; MG/1
1 TABLET, FILM COATED ORAL DAILY
COMMUNITY

## 2020-05-20 RX ORDER — ONDANSETRON 4 MG/1
4 TABLET, FILM COATED ORAL EVERY 8 HOURS PRN
COMMUNITY

## 2020-05-20 NOTE — PROGRESS NOTES
[address]  [address] [place] [id number]  Dept: [phone]  Dept Fax: [phone]  Loc: [phone]    Visit Date: 5/20/2020    Rhoda Cervantes is a 80 y.o. 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 2/2/2015 2/2/2015: Stenting mid-LAD using Xience Alpine 2.75 X 28 mm, post-dilated to 3.0 mm distally and 3.18 mm proximally.  Dr. Brittany Frey      Past Surgical History:   Procedure Laterality Date    [address]  2015    stent x1    COLON SURGERY  1994    COLONOSCOPY  7/19/05    EYE SURGERY  2009    left corneal transplant    EYE SURGERY  2008    right corneal transplant    EYE SURGERY      bilateral cataract    [facility]. PAIN BLOCK Left 12/14/2018    Synvisc ONE [facility]    hysterectomy    KYPHOSIS SURGERY  2017    L-1 compression fracture    MIDDLE EAR SURGERY  2005    right tympnaoplasty by dr Felicitas Albarran Right 5-3-13     Family History   Problem Relation Age of Onset    Arthritis Mother     Heart Disease Father     Heart Disease Sister     Heart Disease Brother     Heart Disease Sister     Heart Disease Sister      Social History     Tobacco Use    Smoking status: Never Smoker    Smokeless tobacco: Never Used   Substance Use Topics    Alcohol use: No      Current Outpatient Medications   Medication Sig Dispense Refill    famotidine (PEPCID) 40 MG tablet Take 40 mg by mouth daily      sennosides-docusate sodium (SENOKOT-S) 8.6-50 MG tablet Take 1 tablet by mouth daily      Cholecalciferol (VITAMIN D3) 125 MCG (5000 UT) TABS Take 1 tablet by mouth daily      ondansetron (ZOFRAN) 4 MG tablet Take 4 mg by mouth every 8 hours as needed for Nausea or Vomiting      metoprolol succinate (TOPROL XL) 25 MG extended release tablet TAKE 1 & 1/2 (ONE & ONE-HALF) TABLETS BY MOUTH ONCE DAILY 135 tablet 0    pantoprazole (PROTONIX) 40 MG tablet Take 1 tablet by mouth once daily 90 tablet 0    losartan (COZAAR) 50 MG tablet Take 1 tablet by mouth once daily 90 tablet 0    isosorbide mononitrate (IMDUR) 30 MG extended release tablet Take 1 tablet by mouth once daily 90 tablet 0    clopidogrel (PLAVIX) 75 MG tablet TAKE 1 TABLET BY MOUTH ONCE DAILY 90 tablet 1    atorvastatin (LIPITOR) 10 MG tablet TAKE 1 TABLET BY MOUTH ONCE DAILY 90 tablet 3    mirtazapine (REMERON) 15 MG tablet Take 7.5 mg by mouth nightly      gabapentin (NEURONTIN) 100 MG capsule Take 400 mg by mouth 3 times daily.  nitroGLYCERIN (NITROSTAT) 0.4 MG SL tablet Place 1 tablet under the tongue every 5 minutes as needed for Chest pain 25 tablet 3    Calcium Citrate-Vitamin D (CALCIUM CITRATE + PO) Take 600 mg by mouth 2 times daily      aspirin 81 MG chewable tablet Take 1 tablet by mouth daily. 30 tablet 3    timolol (BETIMOL) 0.5 % ophthalmic solution Place 1 drop into both eyes 2 times daily.  Multiple Vitamins-Minerals (OCUVITE) TABS oral tablet Take 1 tablet by mouth daily.         Flaxseed, Linseed, (FLAX SEED OIL) 1000 MG CAPS Take 1 capsule by mouth 2 times daily. No current facility-administered medications for this visit. Allergies   Allergen Reactions    Codeine     Lisinopril     Simvastatin      Muscle aches    Valtrex [Valacyclovir Hcl]     Sulfa Antibiotics Rash     Health Maintenance   Topic Date Due    DTaP/Tdap/Td vaccine (1 - Tdap) 08/28/1947    Shingles Vaccine (1 of 2) 08/28/1978    Lipid screen  04/01/2018    Annual Wellness Visit (AWV)  06/23/2019    Potassium monitoring  11/14/2019    Creatinine monitoring  11/14/2019    Flu vaccine (Season Ended) 09/01/2020    Pneumococcal 65+ years Vaccine  Completed    Hepatitis A vaccine  Aged Out    Hepatitis B vaccine  Aged Out    Hib vaccine  Aged Out    Meningococcal (ACWY) vaccine  Aged Out       Subjective:  Review of Systems  General:   No fever, no chills, some more fatigue or weight loss  Pulmonary:    some dyspnea, no wheezing  Cardiac:    Denies recent chest pain,   GI:     No nausea or vomiting, no abdominal pain  Neuro:     No dizziness or light headedness,   Musculoskeletal:  No recent active issues  Extremities:   No edema, no obvious claudication       Objective:  Physical Exam  /80   Pulse 67   Ht 4' 10\" (1.473 m)   Wt 131 lb 12.8 oz (59.8 kg)   BMI 27.55 kg/m²   General:   Well developed, well nourished  Lungs:    Clear to auscultation  Heart:    Normal S1 S2, Slight murmur. no rubs, no gallops  Abdomen:   Soft, non tender, no organomegalies, positive bowel sounds  Extremities:   No edema, no cyanosis, good peripheral pulses  Neurological:   Awake, alert, oriented. No obvious focal deficits  Musculoskelatal:  No obvious deformities    Assessment:      Diagnosis Orders   1. SOB (shortness of breath)  EKG 12 lead    EKG 12 lead   2. Palpitations  EKG 12 lead    EKG 12 lead   3.  Coronary artery disease involving native coronary artery of native heart without angina pectoris     possible multifactorial   No clear cause of her weakness  ECG in office was

## 2020-06-22 RX ORDER — LOSARTAN POTASSIUM 50 MG/1
TABLET ORAL
Qty: 90 TABLET | Refills: 0 | Status: SHIPPED | OUTPATIENT
Start: 2020-06-22 | End: 2020-09-28

## 2020-07-16 RX ORDER — PANTOPRAZOLE SODIUM 40 MG/1
TABLET, DELAYED RELEASE ORAL
Qty: 90 TABLET | Refills: 3 | Status: SHIPPED | OUTPATIENT
Start: 2020-07-16 | End: 2021-07-08

## 2020-09-28 RX ORDER — LOSARTAN POTASSIUM 50 MG/1
TABLET ORAL
Qty: 90 TABLET | Refills: 3 | Status: SHIPPED | OUTPATIENT
Start: 2020-09-28 | End: 2020-12-07

## 2020-10-09 RX ORDER — CLOPIDOGREL BISULFATE 75 MG/1
TABLET ORAL
Qty: 90 TABLET | Refills: 3 | Status: SHIPPED | OUTPATIENT
Start: 2020-10-09 | End: 2021-07-07

## 2020-10-09 RX ORDER — ATORVASTATIN CALCIUM 10 MG/1
TABLET, FILM COATED ORAL
Qty: 90 TABLET | Refills: 2 | Status: SHIPPED | OUTPATIENT
Start: 2020-10-09 | End: 2021-07-07

## 2020-10-09 RX ORDER — METOPROLOL SUCCINATE 25 MG/1
TABLET, EXTENDED RELEASE ORAL
Qty: 135 TABLET | Refills: 3 | Status: SHIPPED | OUTPATIENT
Start: 2020-10-09 | End: 2021-05-07 | Stop reason: SDUPTHER

## 2020-10-26 ENCOUNTER — TELEPHONE (OUTPATIENT)
Dept: CARDIOLOGY CLINIC | Age: 85
End: 2020-10-26

## 2020-10-26 NOTE — TELEPHONE ENCOUNTER
Spoke to Jannet  at [employer] below. She verbally understood.  And faxed over office note also

## 2020-10-26 NOTE — TELEPHONE ENCOUNTER
[facility] faxed note and b/p-temp log for review. Note states - see b/p and any changes b/p 172/86-hr  81. Takes metoprolol 37.5 mg daily and HS. 10/22/20 add 12.5 mg. Metoprolol in am and continue 37.5 mg at HS. See attached b/p and temp log. Dr. Renee Do advise?

## 2020-12-07 ENCOUNTER — TELEPHONE (OUTPATIENT)
Dept: CARDIOLOGY CLINIC | Age: 85
End: 2020-12-07

## 2020-12-07 RX ORDER — VALSARTAN 160 MG/1
160 TABLET ORAL DAILY
COMMUNITY
End: 2020-12-08 | Stop reason: SDUPTHER

## 2020-12-07 NOTE — TELEPHONE ENCOUNTER
Spoke with Emma at nursing home, relaying the message and she verbally understood.  Med list updated

## 2020-12-08 RX ORDER — VALSARTAN 160 MG/1
160 TABLET ORAL DAILY
Qty: 30 TABLET | Refills: 5 | Status: SHIPPED | OUTPATIENT
Start: 2020-12-08 | End: 2021-01-04

## 2021-01-04 RX ORDER — VALSARTAN 320 MG/1
320 TABLET ORAL DAILY
COMMUNITY
End: 2021-01-05 | Stop reason: SDUPTHER

## 2021-01-04 NOTE — TELEPHONE ENCOUNTER
Received an update on patients BP from [facility] [address]. Please review. Anything needed?

## 2021-01-05 RX ORDER — VALSARTAN 320 MG/1
320 TABLET ORAL DAILY
Qty: 30 TABLET | Refills: 5 | Status: SHIPPED | OUTPATIENT
Start: 2021-01-05 | End: 2021-06-14

## 2021-01-05 NOTE — TELEPHONE ENCOUNTER
Clair Nurse from [facility] voiced understanding asking for med to be sent to [facility]. Refill in another encounter.

## 2021-01-12 ENCOUNTER — TELEPHONE (OUTPATIENT)
Dept: FAMILY MEDICINE CLINIC | Age: 86
End: 2021-01-12

## 2021-01-12 NOTE — TELEPHONE ENCOUNTER
1. Appt time and date. (give directions)     2. Arrive 15 min before appt. 3. Please bring all medications to appt. 4. Bring immunization record.   (if no record, which immunizations have you had and where?)          Future Appointments   Date Time Provider Bj Ahuja   1/28/2021  9:00 AM Arnold Santiago, [address] [place] [address]   5/20/2021 11:30 AM Daljit Echols MD N [facility] - [facility]     Pt confirmed appt

## 2021-01-25 RX ORDER — AMLODIPINE BESYLATE 5 MG/1
5 TABLET ORAL DAILY
Qty: 30 TABLET | Refills: 3 | Status: SHIPPED | OUTPATIENT
Start: 2021-01-25 | End: 2021-04-29

## 2021-01-25 RX ORDER — AMLODIPINE BESYLATE 5 MG/1
5 TABLET ORAL DAILY
COMMUNITY
End: 2021-01-25 | Stop reason: SDUPTHER

## 2021-01-27 RX ORDER — TIMOLOL MALEATE 5 MG/ML
SOLUTION/ DROPS OPHTHALMIC
COMMUNITY
Start: 2020-10-28 | End: 2021-01-28

## 2021-01-27 RX ORDER — GABAPENTIN 400 MG/1
CAPSULE ORAL
COMMUNITY
Start: 2020-12-21 | End: 2021-07-14 | Stop reason: SDUPTHER

## 2021-01-27 NOTE — PROGRESS NOTES
Chief Complaint   Patient presents with    New Patient     Carol Mohamud previous pcp    Coronary Artery Disease    Hypertension    Hyperlipidemia    Gastroesophageal Reflux    Other     Fibromyalgia    Glaucoma     History obtained from daughter and the patient. SUBJECTIVE:  Lucia Russell is a 80 y.o. female that presents today for establishing care with new physician, etc. New patient, 1st time [facility] to [facility]. -CAD:    HPI:  Follows with cardio  No CP/sOB  Due for labs  On DAPT, BB, ARB and statin    History of myocardial infarction? Yes    History of heart failure? No  Current symptoms of angina? No   Patient compliant with therapy? Yes  Tobacco use? No      Antiplatelet therapy? Yes    Beta-blocker therapy? Yes   ACE/ARB therapy - Yes      -HTN:    HPI:    Taking meds as prescribed ?: yes  Tolerating well ?: yes  Side Effects ?: denies  BP at home ?: typically <140/90  Working on TLCS ?: yes  Chest Pain/SOB/Palpitations? denies    BP Readings from Last 3 Encounters:   01/28/21 135/75   05/20/20 138/80   06/06/19 (!) 149/85       -HLD:    HPI:    Taking meds as prescribed ?: yes  Tolerating well ?: yes  Side Effects ?: denies  Muscle Pain?: denies  Working on [employer] ?: yes      -GERD:    HPI:  Gets on and off nausea with it, but controlled with meds    Taking meds as prescribed ?: yes  Tolerating well ?: yes  Side Effects ?: deneis  Dysphagia ?: deneis  Odynophagia ?: denies  Melena ?: denies  Working on TLCS ?: yes      -Fibro:  On ro  Works well enough  Pain controlled      -Glaucoma:  Mostly blind both eyes  On timolol  Seen optho, Maeve Islas, in the past  But is just f/u prn now.        Age/Gender Health Maintenance    Lipid -   Lab Results   Component Value Date    CHOL 154 04/01/2017    CHOL 180 08/31/2015    CHOL 247 (H) 02/01/2015     Lab Results   Component Value Date    TRIG 105 04/01/2017    TRIG 91 08/31/2015    TRIG 84 02/01/2015     Lab Results   Component Value Date HDL 67 04/01/2017    HDL 80 08/31/2015    HDL 82 02/01/2015     Lab Results   Component Value Date    LDLCALC 66 04/01/2017    LDLCALC 82 08/31/2015    LDLCALC 148 02/01/2015     No results found for: LABVLDL, VLDL  No results found for: CHOLHDLRATIO      DM Screen -   Lab Results   Component Value Date    GLUCOSE 107 05/20/2020    GLUCOSE 77 11/14/2018       Colon Cancer Screening - aged out  [address] (Age 54 to [de-identified] with 30 pack year hx, current smoker or quit within past 15 years) - aged out    Tetanus - to get at pharmacy per medicare rules  Influenza Vaccine - UTD FALL 2020  Pneumonia Vaccine - UTD  Zoster - to get at pharmacy per medicare rules     Breast Cancer Screening - aged out  Cervical Cancer Screening - aged out  Osteoporosis Screening - declines JAN 2021      Current Outpatient Medications   Medication Sig Dispense Refill    carbamide peroxide (DEBROX) 6.5 % otic solution 5 drops as needed      lidocaine-prilocaine (EMLA) 2.5-2.5 % cream Apply topically as needed for Pain Apply topically as needed.       acetaminophen (TYLENOL) 500 MG tablet Take 500 mg by mouth every 6 hours as needed for Pain      timolol (TIMOPTIC) 0.5 % ophthalmic solution 1 drop 2 times daily      gabapentin (NEURONTIN) 400 MG capsule TAKE 1 CAPSULE BY MOUTH THREE TIMES DAILY      amLODIPine (NORVASC) 5 MG tablet Take 1 tablet by mouth daily 30 tablet 3    valsartan (DIOVAN) 320 MG tablet Take 1 tablet by mouth daily 30 tablet 5    metoprolol succinate (TOPROL XL) 25 MG extended release tablet TAKE 1 & 1/2 (ONE & ONE-HALF) TABLETS BY MOUTH ONCE DAILY (Patient taking differently: Take 37.5 mg by mouth nightly TAKE 1/2 TAB BY MOUTH IN THE AM & TAKE 1 & 1/2 (ONE & ONE-HALF) TABLETS BY MOUTH AT BED TIME) 135 tablet 3    clopidogrel (PLAVIX) 75 MG tablet Take 1 tablet by mouth once daily 90 tablet 3    atorvastatin (LIPITOR) 10 MG tablet Take 1 tablet by mouth once daily 90 tablet 2    pantoprazole (PROTONIX) 40 MG tablet Take 1 tablet by mouth once daily 90 tablet 3    famotidine (PEPCID) 40 MG tablet Take 40 mg by mouth daily      sennosides-docusate sodium (SENOKOT-S) 8.6-50 MG tablet Take 1 tablet by mouth daily      Cholecalciferol (VITAMIN D3) 125 MCG (5000 UT) TABS Take 1 tablet by mouth daily      ondansetron (ZOFRAN) 4 MG tablet Take 4 mg by mouth every 8 hours as needed for Nausea or Vomiting      mirtazapine (REMERON) 15 MG tablet Take 7.5 mg by mouth nightly      nitroGLYCERIN (NITROSTAT) 0.4 MG SL tablet Place 1 tablet under the tongue every 5 minutes as needed for Chest pain 25 tablet 3    Calcium Citrate-Vitamin D (CALCIUM CITRATE + PO) Take 600 mg by mouth 2 times daily      Multiple Vitamins-Minerals (OCUVITE) TABS oral tablet Take 1 tablet by mouth daily.  Flaxseed, Linseed, (FLAX SEED OIL) 1000 MG CAPS Take 1 capsule by mouth 2 times daily.  aspirin 81 MG chewable tablet Take 1 tablet by mouth daily. 30 tablet 3     No current facility-administered medications for this visit. No orders of the defined types were placed in this encounter. All medications reviewed and reconciled, including OTC and herbal medications. Updated list given to patient. Patient Active Problem List    Diagnosis Date Noted    ASHD (arteriosclerotic heart disease)     Constipation     DDD (degenerative disc disease), lumbar     Dyslipidemia     Glaucoma     History of vertebral compression fracture; L1     Hypertension, essential     S/P PTCA: 2/2/2015: Stenting mid-LAD using Xience Alpine 2.75 X 28 mm, post-dilated to 3.0 mm distally and 3.18 mm proximally. 02/02/2015 2/2/2015: Stenting mid-LAD using Xience Alpine 2.75 X 28 mm, post-dilated to 3.0 mm distally and 3.18 mm proximally.   Dr. Ruth Sandoval Tinnitus of both ears 11/19/2013    Status post tympanoplasty 06/24/2013    VMR (vasomotor rhinitis) 10/09/2012       Past Medical History:   Diagnosis Date    ASHD (arteriosclerotic heart disease)     Constipation     DDD (degenerative disc disease), lumbar     Dyslipidemia     Fibromyalgia     GERD (gastroesophageal reflux disease)     Glaucoma     History of vertebral compression fracture; L1     Hypertension, essential     Nausea & vomiting     Osteoporosis     S/P PTCA: 2/2/2015: Stenting mid-LAD using Xience Alpine 2.75 X 28 mm, post-dilated to 3.0 mm distally and 3.18 mm proximally. 02/02/2015 2/2/2015: Stenting mid-LAD using Xience Alpine 2.75 X 28 mm, post-dilated to 3.0 mm distally and 3.18 mm proximally.  Dr. Wesley Duong Status post tympanoplasty 6/24/2013    Tinnitus of both ears 11/19/2013       Past Surgical History:   Procedure Laterality Date    APPENDECTOMY  1940    CARDIAC CATHETERIZATION  2015    stent x1    COLON SURGERY  1994    COLONOSCOPY  07/19/2005    EYE SURGERY  2009    left corneal transplant    EYE SURGERY  2008    right corneal transplant    EYE SURGERY      bilateral cataract    [place] [facility] PAIN BLOCK Left 12/14/2018    Synvisc ONE LEFT knee performed [facility] Venkat Rice [facility] 61    hysterectomy    KYPHOSIS SURGERY  2017    L-1 compression fracture    MIDDLE EAR SURGERY  2005    right tympnaoplasty by dr Lolly Scheuermann Right 05/03/2013       Allergies   Allergen Reactions    Codeine     Diazepam     Lisinopril     Simvastatin      Muscle aches    Valtrex [Valacyclovir Hcl]     Sulfa Antibiotics Rash       Social History     Tobacco Use    Smoking status: Never Smoker    Smokeless tobacco: Never Used   Substance Use Topics    Alcohol use: No       Family History   Problem Relation Age of Onset    Arthritis Mother     Heart Disease Father     Heart Disease Sister     Heart Disease Brother     Heart Disease Sister     Heart Disease Sister        I have reviewed the patient's past medical history, past surgical history, allergies, medications, social and family history and I have made updates where appropriate. Review of Systems  Positive responses are highlighted in bold    Constitutional:  Fever, Chills, Night Sweats, Fatigue, Unexpected changes in weight  Eyes:  Eye discharge, Eye pain, Eye redness, Visual disturbances   HENT:  Ear pain, Tinnitus, Nosebleeds, Trouble swallowing, Hearing loss, Sore throat  Cardiovascular:  Chest Pain, Palpitations, Orthopnea, Paroxysmal Nocturnal Dyspnea  Respiratory:  Cough, Wheezing, Shortness of breath, Chest tightness, Apnea  Gastrointestinal:  Nausea, Vomiting, Diarrhea, Constipation, Heartburn, Blood in stool  Genitourinary:  Difficulty or painful urination, Flank pain, Change in frequency, Urgency  Skin:  Color change, Rash, Itching, Wound  Psychiatric:  Hallucinations, Anxiety, Depression, Suicidal ideation  Hematological:  Enlarged glands, Easy bleeding, Easily bruising  Musculoskeletal:  Joint pain, Back pain, Gait problems, Joint swelling, Myalgias  Neurological:  Dizziness, Headaches, Presyncope, Numbness, Seizures, Tremors  Allergy:  Environmental allergies, Food allergies  Endocrine:  Heat Intolerance, Cold Intolerance, Polydipsia, Polyphagia, Polyuria      PHYSICAL EXAM:  Vitals:    01/28/21 0913 01/28/21 0940   BP: (!) 152/75 135/75   Pulse: 78    Resp: 16    Temp: 97.6 °F (36.4 °C)    TempSrc: Temporal    SpO2: 96%    Weight: 125 lb 12.8 oz (57.1 kg)    Height: 4' 9\" (1.448 m)      Body mass index is 27.22 kg/m². Pain Score:    3(Fibromyalgia pain)    VS Reviewed  General Appearance: A&O x 3, No acute distress,well developed and well- nourished  Head: normocephalic and atraumatic  Eyes: pupils equal, round, and reactive to light, extraocular eye movements intact, conjunctivae and eye lids without erythema  ENT: external ear and ear canal clear bilaterally, TMs intact and regular, nose without deformity, nasal mucosa and turbinates normal without polyps, oropharynx normal, dentition is normal for age  Neck: supple and non-tender without timolol drops. DISPOSITION    Return in about 6 months (around 7/28/2021) for follow-up on chronic medical conditions, sooner as needed. Maryjo Guevara released without restrictions. Future Appointments   Date Time Provider Bj Leigha   5/20/2021 11:30 AM Daljit Echols MD [facility]   7/28/2021 10:20 AM Arnold Santiago DO [facility]     PATIENT COUNSELING    Counseling was provided today regarding the following topics: Healthy eating habits, Regular exercise, substance abuse and healthy sleep habits. Lynne received counseling on the following healthy behaviors: nutrition, exercise and medication adherence    Patient given educational materials on: See Attached    I have instructed Maryjo Guevara to complete a self tracking handout on Blood Pressures  and instructed them to bring it with them to her next appointment. Barriers to learning and self management: Native, daughter present. Discussed use, benefit, and side effects of prescribed medications. Barriers to medication compliance addressed. All patient questions answered. Pt voiced understanding.        Electronically signed by Arnold Santiago DO on 1/28/2021 at 10:03 AM

## 2021-01-28 ENCOUNTER — OFFICE VISIT (OUTPATIENT)
Dept: FAMILY MEDICINE CLINIC | Age: 86
End: 2021-01-28
Payer: MEDICARE

## 2021-01-28 VITALS
HEART RATE: 78 BPM | SYSTOLIC BLOOD PRESSURE: 135 MMHG | RESPIRATION RATE: 16 BRPM | HEIGHT: 57 IN | DIASTOLIC BLOOD PRESSURE: 75 MMHG | TEMPERATURE: 97.6 F | WEIGHT: 125.8 LBS | OXYGEN SATURATION: 96 % | BODY MASS INDEX: 27.14 KG/M2

## 2021-01-28 DIAGNOSIS — I25.10 ASHD (ARTERIOSCLEROTIC HEART DISEASE): Primary | ICD-10-CM

## 2021-01-28 DIAGNOSIS — H40.9 GLAUCOMA, UNSPECIFIED GLAUCOMA TYPE, UNSPECIFIED LATERALITY: ICD-10-CM

## 2021-01-28 DIAGNOSIS — M79.7 FIBROMYALGIA: ICD-10-CM

## 2021-01-28 DIAGNOSIS — E78.5 DYSLIPIDEMIA: ICD-10-CM

## 2021-01-28 DIAGNOSIS — K21.9 GASTROESOPHAGEAL REFLUX DISEASE, UNSPECIFIED WHETHER ESOPHAGITIS PRESENT: ICD-10-CM

## 2021-01-28 DIAGNOSIS — I10 HYPERTENSION, ESSENTIAL: ICD-10-CM

## 2021-01-28 PROCEDURE — 99204 OFFICE O/P NEW MOD 45 MIN: CPT | Performed by: FAMILY MEDICINE

## 2021-01-28 RX ORDER — METOPROLOL SUCCINATE 25 MG/1
12.5 TABLET, EXTENDED RELEASE ORAL DAILY
COMMUNITY
End: 2021-01-28

## 2021-01-28 RX ORDER — TIMOLOL MALEATE 5 MG/ML
1 SOLUTION/ DROPS OPHTHALMIC 2 TIMES DAILY
COMMUNITY

## 2021-01-28 RX ORDER — LIDOCAINE AND PRILOCAINE 25; 25 MG/G; MG/G
CREAM TOPICAL PRN
COMMUNITY

## 2021-01-28 RX ORDER — ACETAMINOPHEN 500 MG
500 TABLET ORAL EVERY 6 HOURS PRN
COMMUNITY

## 2021-01-28 ASSESSMENT — PATIENT HEALTH QUESTIONNAIRE - PHQ9
SUM OF ALL RESPONSES TO PHQ QUESTIONS 1-9: 0
SUM OF ALL RESPONSES TO PHQ QUESTIONS 1-9: 0
SUM OF ALL RESPONSES TO PHQ9 QUESTIONS 1 & 2: 0
2. FEELING DOWN, DEPRESSED OR HOPELESS: 0

## 2021-01-28 NOTE — PATIENT INSTRUCTIONS
ibuprofen. Some of these medicines can raise blood pressure. · Learn how to check your blood pressure at home. Lifestyle changes  · Stay at a healthy weight. This is especially important if you put on weight around the waist. Losing even 10 pounds can help you lower your blood pressure. · If your doctor recommends it, get more exercise. Walking is a good choice. Bit by bit, increase the amount you walk every day. Try for at least 30 minutes on most days of the week. You also may want to swim, bike, or do other activities. · Avoid or limit alcohol. Talk to your doctor about whether you can drink any alcohol. · Try to limit how much sodium you eat to less than 2,300 milligrams (mg) a day. Your doctor may ask you to try to eat less than 1,500 mg a day. · Eat plenty of fruits (such as bananas and oranges), vegetables, legumes, whole grains, and low-fat dairy products. · Lower the amount of saturated fat in your diet. Saturated fat is found in animal products such as milk, cheese, and meat. Limiting these foods may help you lose weight and also lower your risk for heart disease. · Do not smoke. Smoking increases your risk for heart attack and stroke. If you need help quitting, talk to your doctor about stop-smoking programs and medicines. These can increase your chances of quitting for good. When should you call for help? Call  911 anytime you think you may need emergency care. This may mean having symptoms that suggest that your blood pressure is causing a serious heart or blood vessel problem. Your blood pressure may be over 180/120. For example, call 911 if:    · You have symptoms of a heart attack. These may include:  ? Chest pain or pressure, or a strange feeling in the chest.  ? Sweating. ? Shortness of breath. ? Nausea or vomiting. ? Pain, pressure, or a strange feeling in the back, neck, jaw, or upper belly or in one or both shoulders or arms. ? Lightheadedness or sudden weakness.   ? A fast or irregular heartbeat.     · You have symptoms of a stroke. These may include:  ? Sudden numbness, tingling, weakness, or loss of movement in your face, arm, or leg, especially on only one side of your body. ? Sudden vision changes. ? Sudden trouble speaking. ? Sudden confusion or trouble understanding simple statements. ? Sudden problems with walking or balance. ? A sudden, severe headache that is different from past headaches.     · You have severe back or belly pain. Do not wait until your blood pressure comes down on its own. Get help right away. Call your doctor now or seek immediate care if:    · Your blood pressure is much higher than normal (such as 180/120 or higher), but you don't have symptoms.     · You think high blood pressure is causing symptoms, such as:  ? Severe headache.  ? Blurry vision. Watch closely for changes in your health, and be sure to contact your doctor if:    · Your blood pressure measures higher than your doctor recommends at least 2 times. That means the top number is higher or the bottom number is higher, or both.     · You think you may be having side effects from your blood pressure medicine. Where can you learn more? Go to [email or web address].[email or web address] and sign in to your [employer] account. Enter B446 in the [employer] box to learn more about \"High Blood Pressure: Care Instructions. \"     If you do not have an account, please click on the \"Sign Up Now\" link. Current as of: December 16, 2019               Content Version: 12.6  © 1122-9545 [employer]. Care instructions adapted under license by [facility] ([facility]). If you have questions about a medical condition or this instruction, always ask your healthcare professional. [place] [address] any warranty or liability for your use of this information.

## 2021-04-06 LAB
CHOLESTEROL, TOTAL: 160 MG/DL
CHOLESTEROL/HDL RATIO: NORMAL
HDLC SERPL-MCNC: 65 MG/DL (ref 35–70)
LDL CHOLESTEROL CALCULATED: 75 MG/DL (ref 0–160)
NONHDLC SERPL-MCNC: NORMAL MG/DL
TRIGL SERPL-MCNC: 102 MG/DL
VLDLC SERPL CALC-MCNC: 20 MG/DL

## 2021-04-08 ENCOUNTER — TELEPHONE (OUTPATIENT)
Dept: FAMILY MEDICINE CLINIC | Age: 86
End: 2021-04-08

## 2021-04-08 DIAGNOSIS — E87.1 HYPONATREMIA: Primary | ICD-10-CM

## 2021-04-08 NOTE — TELEPHONE ENCOUNTER
----- Message from Dawna Gonzalez, DO sent at 4/8/2021  5:58 AM EDT -----  Please let family know that labs ok other than Na+ slightly low at 132. I'd like to repeat this early next wk to ensure it either returns to normal or stays about the same  Lab order will need printed out. Let me know if questions, thanks!

## 2021-04-08 NOTE — TELEPHONE ENCOUNTER
Pt's  informed. Left detailed msg on pt's daughter's voice mail informing her of Dr Kaye Cuevas. Informed her we will fax the lab order to [facility] per Chris's request.     Spoke with Jeramy Daily from [facility] and informed her labs to be drawn early next week.   Faxed order to her at [phone]

## 2021-04-18 DIAGNOSIS — E87.1 HYPONATREMIA: Primary | ICD-10-CM

## 2021-04-19 ENCOUNTER — TELEPHONE (OUTPATIENT)
Dept: FAMILY MEDICINE CLINIC | Age: 86
End: 2021-04-19

## 2021-04-19 NOTE — TELEPHONE ENCOUNTER
----- Message from Gurjit Willson DO sent at 4/18/2021  9:23 AM EDT -----  Please let family know that BMP is stable  Would like to repeat in 4 wks to confirm  Let me know if questions, thanks!

## 2021-04-29 RX ORDER — AMLODIPINE BESYLATE 5 MG/1
5 TABLET ORAL DAILY
Qty: 30 TABLET | Refills: 0 | Status: SHIPPED | OUTPATIENT
Start: 2021-04-29 | End: 2021-05-19 | Stop reason: SDUPTHER

## 2021-05-07 RX ORDER — METOPROLOL SUCCINATE 25 MG/1
37.5 TABLET, EXTENDED RELEASE ORAL NIGHTLY
Qty: 60 TABLET | Refills: 0 | Status: SHIPPED | OUTPATIENT
Start: 2021-05-07 | End: 2021-05-20 | Stop reason: SDUPTHER

## 2021-05-19 ENCOUNTER — TELEPHONE (OUTPATIENT)
Dept: FAMILY MEDICINE CLINIC | Age: 86
End: 2021-05-19

## 2021-05-19 DIAGNOSIS — I10 HYPERTENSION, ESSENTIAL: Primary | ICD-10-CM

## 2021-05-19 DIAGNOSIS — F32.A DEPRESSION, UNSPECIFIED DEPRESSION TYPE: ICD-10-CM

## 2021-05-19 RX ORDER — MIRTAZAPINE 15 MG/1
7.5 TABLET, FILM COATED ORAL NIGHTLY
Qty: 45 TABLET | Refills: 3 | Status: SHIPPED | OUTPATIENT
Start: 2021-05-19 | End: 2022-04-11

## 2021-05-19 RX ORDER — AMLODIPINE BESYLATE 5 MG/1
5 TABLET ORAL DAILY
Qty: 90 TABLET | Refills: 3 | Status: SHIPPED | OUTPATIENT
Start: 2021-05-19 | End: 2021-06-14 | Stop reason: SDUPTHER

## 2021-05-19 NOTE — TELEPHONE ENCOUNTER
[id number] Mayda Longo  Let's see what Basilia Copeland thinks 1st  And then can f/u with me after if he feels that she needs to based on his evaluation  Let me know if questions, thanks!

## 2021-05-19 NOTE — TELEPHONE ENCOUNTER
Carey Evans,   [facility] Clinical Support  Caller: Unspecified (Today,  7:08 AM)  Reviewed note from [facility] about recurrent dizzy spells   Can you f/u with them, the pt if able and family and get a bit more hx     Let me know, thanks!

## 2021-05-19 NOTE — TELEPHONE ENCOUNTER
Per agata, nurse nazario says pt gets dizzy here and there but more frequent in the AM. No unsteady gait noted. No falls that she knows of. Nurse states pt has been acting normal, eating normal, nothing seems out of the ordinary. pts daughter states she was there Saturday and states the patient was not feeling good and didn't look like she was feeling good and said she was feeling dizzy. Daughter, [place] Guzmna says when she spoke to [facility] they said pts BP's were good. Pt has appt tomorrow with dr Kelley Hughes to discuss this. Pt gets up at night or in the morning and will get the dizziness, not really a set time or day. Will feel slightly nauseas but no vomiting. Pt sleeps more when this happens. Pt has not had any falls recently per spouse, herald. Appetite is good. Pt has been having the dizziness past couple days. States when she is sitting there the room feels like its spinning. No vision changes.

## 2021-05-20 ENCOUNTER — OFFICE VISIT (OUTPATIENT)
Dept: CARDIOLOGY CLINIC | Age: 86
End: 2021-05-20
Payer: MEDICARE

## 2021-05-20 VITALS
HEART RATE: 74 BPM | BODY MASS INDEX: 27.14 KG/M2 | SYSTOLIC BLOOD PRESSURE: 138 MMHG | WEIGHT: 125.8 LBS | DIASTOLIC BLOOD PRESSURE: 62 MMHG | HEIGHT: 57 IN

## 2021-05-20 DIAGNOSIS — I25.10 CORONARY ARTERY DISEASE INVOLVING NATIVE CORONARY ARTERY OF NATIVE HEART WITHOUT ANGINA PECTORIS: ICD-10-CM

## 2021-05-20 DIAGNOSIS — R06.02 SOB (SHORTNESS OF BREATH): Primary | ICD-10-CM

## 2021-05-20 DIAGNOSIS — R00.2 PALPITATIONS: ICD-10-CM

## 2021-05-20 PROCEDURE — 99214 OFFICE O/P EST MOD 30 MIN: CPT | Performed by: NUCLEAR MEDICINE

## 2021-05-20 PROCEDURE — 93000 ELECTROCARDIOGRAM COMPLETE: CPT | Performed by: NUCLEAR MEDICINE

## 2021-05-20 RX ORDER — METOPROLOL SUCCINATE 25 MG/1
37.5 TABLET, EXTENDED RELEASE ORAL SEE ADMIN INSTRUCTIONS
Qty: 180 TABLET | Refills: 3 | Status: SHIPPED | OUTPATIENT
Start: 2021-05-20 | End: 2021-07-07

## 2021-05-20 RX ORDER — METOPROLOL SUCCINATE 25 MG/1
37.5 TABLET, EXTENDED RELEASE ORAL NIGHTLY
Qty: 180 TABLET | Refills: 3 | Status: SHIPPED | OUTPATIENT
Start: 2021-05-20 | End: 2021-05-20 | Stop reason: SDUPTHER

## 2021-05-20 NOTE — PROGRESS NOTES
[address] [place] [address] [address]  [facility] [address]  Dept: [phone]  Dept Fax: [phone]  Loc: [phone]    Visit Date: 5/20/2021    Ansel Gowers is a 80 y.o. female who presents todayfor:  Chief Complaint   Patient presents with   Víctor Pamela    Hypertension    Dizziness   major memory issues  Some dizziness  No syncope  Some fatigue   No obvious chest pain   No syncope  Limited patient       HPI:  HPI  Past Medical History:   Diagnosis Date    ASHD (arteriosclerotic heart disease)     Constipation     DDD (degenerative disc disease), lumbar     Dyslipidemia     Fibromyalgia     GERD (gastroesophageal reflux disease)     Glaucoma     History of vertebral compression fracture; L1     Hypertension, essential     Nausea & vomiting     Osteoporosis     S/P PTCA: 2/2/2015: Stenting mid-LAD using Xience Alpine 2.75 X 28 mm, post-dilated to 3.0 mm distally and 3.18 mm proximally. 02/02/2015 2/2/2015: Stenting mid-LAD using Xience Alpine 2.75 X 28 mm, post-dilated to 3.0 mm distally and 3.18 mm proximally.  Dr. Casillas Lighter Status post tympanoplasty 6/24/2013    Tinnitus of both ears 11/19/2013      Past Surgical History:   Procedure Laterality Date    APPENDECTOMY  1940    CARDIAC CATHETERIZATION  2015    stent x1    COLON SURGERY  1994    COLONOSCOPY  07/19/2005    EYE SURGERY  2009    left corneal transplant    EYE SURGERY  2008    right corneal transplant    EYE SURGERY      bilateral cataract    [facility] PAIN BLOCK Left 12/14/2018    [facility]    hysterectomy    KYPHOSIS SURGERY  2017    L-1 compression fracture    MIDDLE EAR SURGERY  2005    right tympnaoplasty by dr Orlando Capone Right 05/03/2013     Family History   Problem Relation Age of Onset    Arthritis Mother     Heart Disease Father     Heart Disease 25 tablet 3    Calcium Citrate-Vitamin D (CALCIUM CITRATE + PO) Take 600 mg by mouth 2 times daily      aspirin 81 MG chewable tablet Take 1 tablet by mouth daily. 30 tablet 3    Multiple Vitamins-Minerals (OCUVITE) TABS oral tablet Take 1 tablet by mouth daily.  Flaxseed, Linseed, (FLAX SEED OIL) 1000 MG CAPS Take 1 capsule by mouth 2 times daily. No current facility-administered medications for this visit. Allergies   Allergen Reactions    Codeine     Diazepam     Lisinopril     Simvastatin      Muscle aches    Valtrex [Valacyclovir Hcl]     Sulfa Antibiotics Rash     Health Maintenance   Topic Date Due    DTaP/Tdap/Td vaccine (1 - Tdap) Never done    Shingles Vaccine (1 of 2) Never done   [employer] Visit (AWV)  Never done    Lipid screen  04/06/2022    Potassium monitoring  05/18/2022    Creatinine monitoring  05/18/2022    Flu vaccine  Completed    Pneumococcal 65+ years Vaccine  Completed    COVID-19 Vaccine  Completed    Hepatitis A vaccine  Aged Out    Hepatitis B vaccine  Aged Out    Hib vaccine  Aged Out    Meningococcal (ACWY) vaccine  Aged Out       Subjective:  Review of Systems  General:   No fever, no chills, No fatigue or weight loss  Pulmonary:    No dyspnea, no wheezing  Cardiac:    Denies recent chest pain,   GI:     No nausea or vomiting, no abdominal pain  Neuro:    some dizziness or light headedness,   Musculoskeletal:  No recent active issues  Extremities:   No edema, no obvious claudication       Objective:  Physical Exam  /62   Pulse 74   Ht 4' 9\" (1.448 m)   Wt 125 lb 12.8 oz (57.1 kg)   BMI 27.22 kg/m²   General:   Well developed, well nourished  Lungs:   Clear to auscultation  Heart:    Normal S1 S2, Slight murmur. no rubs, no gallops  Abdomen:   Soft, non tender, no organomegalies, positive bowel sounds  Extremities:   No edema, no cyanosis, good peripheral pulses  Neurological:   Awake, alert, oriented.  No obvious focal deficits  Musculoskelatal:  No obvious deformities  Assessment:      Diagnosis Orders   1. SOB (shortness of breath)  EKG 12 lead   2. Palpitations  EKG 12 lead   3. Coronary artery disease involving native coronary artery of native heart without angina pectoris  EKG 12 lead   as above  ? ? Dizziness  ?? meds vs other causes   ECG in office was done today. I reviewed the ECG. No acute findings    Plan:  No follow-ups on file. As above  I have spent 25 minutes face to face with the patient. More than 50% of this time was spent counseling and coordinating care. Discussed work up for dizziness  Patient and family not interested in doing it  Monitor the BP  Thank you for allowing me to participate in the care of your patient. Please don't hesitate to contact me regarding any further issues related to the patient care    Orders Placed:  Orders Placed This Encounter   Procedures    EKG 12 lead     Order Specific Question:   Reason for Exam?     Answer: Other       Medications Prescribed:  No orders of the defined types were placed in this encounter. Discussed use, benefit, and side effects of prescribed medications. All patient questions answered. Pt voicedunderstanding. Instructed to continue current medications, diet and exercise. Continue risk factor modification and medical management. Patient agreed with treatment plan. Follow up as directed.     Electronically signedby Yessica Rousseau MD on 5/20/2021 at 11:44 AM

## 2021-05-21 ENCOUNTER — TELEPHONE (OUTPATIENT)
Dept: FAMILY MEDICINE CLINIC | Age: 86
End: 2021-05-21

## 2021-05-21 NOTE — TELEPHONE ENCOUNTER
----- Message from Alexia Dumont DO sent at 5/21/2021  7:04 AM EDT -----  Please let family know that BMP is stable and appropriate. Let me know if questions, thanks!

## 2021-05-26 ENCOUNTER — TELEPHONE (OUTPATIENT)
Dept: FAMILY MEDICINE CLINIC | Age: 86
End: 2021-05-26

## 2021-05-26 DIAGNOSIS — R42 VERTIGO: Primary | ICD-10-CM

## 2021-05-26 NOTE — TELEPHONE ENCOUNTER
aKleigh Cheatham  Will place referral  Let me know if questions, thanks! Diagnosis Orders   1.  Vertigo  External Referral To Audiology    Audiometry with tympanometry    Videonystagmography

## 2021-05-26 NOTE — TELEPHONE ENCOUNTER
Pt's daughter wanted to let you know pt saw Dr Radha Miller and he didn't feel her dizziness was heart related, possibly middle ear or stroke related  The dizzy spells are more frequent, and she has a harder time hearing now  If you feel an audiology referral is next, daughter would like referral to [employer]

## 2021-06-14 DIAGNOSIS — I10 HYPERTENSION, ESSENTIAL: ICD-10-CM

## 2021-06-14 RX ORDER — AMLODIPINE BESYLATE 5 MG/1
5 TABLET ORAL DAILY
Qty: 90 TABLET | Refills: 3 | Status: SHIPPED | OUTPATIENT
Start: 2021-06-14 | End: 2021-06-21

## 2021-06-14 RX ORDER — VALSARTAN 320 MG/1
TABLET ORAL
Qty: 90 TABLET | Refills: 3 | Status: SHIPPED | OUTPATIENT
Start: 2021-06-14 | End: 2022-07-11 | Stop reason: SDUPTHER

## 2021-06-14 NOTE — TELEPHONE ENCOUNTER
Hector David called requesting a refill on the following medications:  Requested Prescriptions     Pending Prescriptions Disp Refills    amLODIPine (NORVASC) 5 MG tablet 90 tablet 3     Sig: Take 1 tablet by mouth daily     Pharmacy verified:  .pv  [facility] [place]    Date of last visit: 05/20/21  Date of next visit (if applicable): 79/06/12

## 2021-06-20 DIAGNOSIS — I10 HYPERTENSION, ESSENTIAL: ICD-10-CM

## 2021-06-21 RX ORDER — AMLODIPINE BESYLATE 5 MG/1
TABLET ORAL
Qty: 90 TABLET | Refills: 3 | Status: SHIPPED | OUTPATIENT
Start: 2021-06-21 | End: 2022-06-07

## 2021-07-07 RX ORDER — METOPROLOL SUCCINATE 25 MG/1
TABLET, EXTENDED RELEASE ORAL
Qty: 135 TABLET | Refills: 3 | Status: SHIPPED | OUTPATIENT
Start: 2021-07-07 | End: 2021-08-30

## 2021-07-07 RX ORDER — ATORVASTATIN CALCIUM 10 MG/1
TABLET, FILM COATED ORAL
Qty: 90 TABLET | Refills: 3 | Status: SHIPPED | OUTPATIENT
Start: 2021-07-07 | End: 2022-07-21

## 2021-07-07 RX ORDER — CLOPIDOGREL BISULFATE 75 MG/1
TABLET ORAL
Qty: 90 TABLET | Refills: 3 | Status: SHIPPED | OUTPATIENT
Start: 2021-07-07 | End: 2022-07-21

## 2021-07-08 RX ORDER — PANTOPRAZOLE SODIUM 40 MG/1
TABLET, DELAYED RELEASE ORAL
Qty: 90 TABLET | Refills: 3 | Status: SHIPPED | OUTPATIENT
Start: 2021-07-08 | End: 2022-07-11 | Stop reason: SDUPTHER

## 2021-07-14 DIAGNOSIS — G62.9 NEUROPATHY: Primary | ICD-10-CM

## 2021-07-14 RX ORDER — GABAPENTIN 400 MG/1
CAPSULE ORAL
Qty: 90 CAPSULE | Refills: 5 | Status: SHIPPED | OUTPATIENT
Start: 2021-07-14 | End: 2022-08-02 | Stop reason: SDUPTHER

## 2021-07-14 NOTE — TELEPHONE ENCOUNTER
Fax Rx request from [facility] for the following   Please approve or refuse Rx request:  Requested Prescriptions     Pending Prescriptions Disp Refills    gabapentin (NEURONTIN) 400 MG capsule 90 capsule      Sig: TAKE 1 CAPSULE BY MOUTH THREE TIMES DAILY       Next appointment:  7/28/2021      FYI there were 3 other Rx requested   Valsartan 320 mg po qd sent by Dr Harry Medina on 06.14.2021  Protonix 40 mg po qd Sent by Dr Harry Medina on 07.08.2021   Metoprolol 25 mg po qd sent by Dr Harry Medina on 07.07.2021  [facility] previously by Dr Harry Medina

## 2021-08-02 NOTE — PROGRESS NOTES
Chief Complaint   Patient presents with    Medicare AW    Follow-up     chroninc    Other     [facility]     Fatigue     History obtained from daughter and the patient. SUBJECTIVE:  Manuelito Payan is a 80 y.o. female that presents today for     -CAD:    HPI:  Follows with cardio  No CP/sOB  Due for labs  On DAPT, BB, ARB and statin    History of myocardial infarction? Yes    History of heart failure? No  Current symptoms of angina? No   Patient compliant with therapy? Yes  Tobacco use? No      Antiplatelet therapy? Yes    Beta-blocker therapy? Yes   ACE/ARB therapy - Yes      -HTN:    HPI:    Taking meds as prescribed ?: yes  Tolerating well ?: yes  Side Effects ?: denies  BP at home ?: typically <140/90  Working on TLCS ?: yes  Chest Pain/SOB/Palpitations? denies    BP Readings from Last 3 Encounters:   08/03/21 (!) 104/58   05/20/21 138/62   01/28/21 135/75       -HLD:    HPI:    Taking meds as prescribed ?: yes  Tolerating well ?: yes  Side Effects ?: denies  Muscle Pain?: denies  Working on TLCS ?: yes      -GERD:    HPI:  Gets on and off nausea with it, but controlled with meds    Taking meds as prescribed ?: yes  Tolerating well ?: yes  Side Effects ?: deneis  Dysphagia ?: deneis  Odynophagia ?: denies  Melena ?: denies  Working on TLCS ?: yes      -Fibro:  On ro  Works well enough  Pain controlled      -Glaucoma:  Mostly blind both eyes  On timolol  Seen optnima, Lissy Jeffries, in the past  But is just f/u prn now.       -Dizziness: Worse a few months ago  Doing better now  Saw cardio, felt not related to cardio  Saw audio, did not do w/u d/t pt not being able to get done  They recommended vestib rehab, however, pt doing better  Having 1 episode every 2 wks or so  Doesn't last long  ? Vertigo      -Fatigue:   Inc fatigue the last month to 6 wks  Just tired more  No depressives sxs  No new meds  Sleeps ok      Age/Gender Health Maintenance    Lipid -   Lab Results Component Value Date    CHOL 160 04/06/2021    CHOL 154 04/01/2017    CHOL 180 08/31/2015     Lab Results   Component Value Date    TRIG 102 04/06/2021    TRIG 105 04/01/2017    TRIG 91 08/31/2015     Lab Results   Component Value Date    HDL 65 04/06/2021    HDL 67 04/01/2017    HDL 80 08/31/2015     Lab Results   Component Value Date    LDLCALC 75 04/06/2021    LDLCALC 66 04/01/2017    LDLCALC 82 08/31/2015     Lab Results   Component Value Date    VLDL 20 04/06/2021     No results found for: CHOLHDLRATIO      DM Screen - 75 (APR 2021)    Lab Results   Component Value Date    GLUCOSE 107 05/20/2020    GLUCOSE 77 11/14/2018       Colon Cancer Screening - aged out  [address] (Age 54 to [de-identified] with 30 pack year hx, current smoker or quit within past 15 years) - aged out    Tetanus - to get at pharmacy per medicare rules  Influenza Vaccine - UTD FALL 2020  Pneumonia Vaccine - UTD  Zoster - to get at pharmacy per medicare rules     Breast Cancer Screening - aged out  Cervical Cancer Screening - aged out  Osteoporosis Screening - declines JAN 2021      Current Outpatient Medications   Medication Sig Dispense Refill    gabapentin (NEURONTIN) 400 MG capsule TAKE 1 CAPSULE BY MOUTH THREE TIMES DAILY 90 capsule 5    pantoprazole (PROTONIX) 40 MG tablet Take 1 tablet by mouth once daily 90 tablet 3    clopidogrel (PLAVIX) 75 MG tablet Take 1 tablet by mouth once daily 90 tablet 3    metoprolol succinate (TOPROL XL) 25 MG extended release tablet TAKE 1 & 1/2 (ONE & ONE-HALF) TABLETS BY MOUTH ONCE DAILY 135 tablet 3    atorvastatin (LIPITOR) 10 MG tablet Take 1 tablet by mouth once daily 90 tablet 3    amLODIPine (NORVASC) 5 MG tablet Take 1 tablet by mouth once daily 90 tablet 3    valsartan (DIOVAN) 320 MG tablet Take 1 tablet by mouth once daily 90 tablet 3    SENNA CO by Combination route      mirtazapine (REMERON) 15 MG tablet Take 0.5 tablets by mouth nightly 45 tablet 3    carbamide peroxide (DEBROX) 6.5 % otic solution 5 drops as needed      lidocaine-prilocaine (EMLA) 2.5-2.5 % cream Apply topically as needed for Pain Apply topically as needed.  acetaminophen (TYLENOL) 500 MG tablet Take 500 mg by mouth every 6 hours as needed for Pain      timolol (TIMOPTIC) 0.5 % ophthalmic solution 1 drop 2 times daily      famotidine (PEPCID) 40 MG tablet Take 40 mg by mouth daily      sennosides-docusate sodium (SENOKOT-S) 8.6-50 MG tablet Take 1 tablet by mouth daily      Cholecalciferol (VITAMIN D3) 125 MCG (5000 UT) TABS Take 1 tablet by mouth daily      ondansetron (ZOFRAN) 4 MG tablet Take 4 mg by mouth every 8 hours as needed for Nausea or Vomiting      nitroGLYCERIN (NITROSTAT) 0.4 MG SL tablet Place 1 tablet under the tongue every 5 minutes as needed for Chest pain 25 tablet 3    Calcium Citrate-Vitamin D (CALCIUM CITRATE + PO) Take 600 mg by mouth 2 times daily      aspirin 81 MG chewable tablet Take 1 tablet by mouth daily. 30 tablet 3    Multiple Vitamins-Minerals (OCUVITE) TABS oral tablet Take 1 tablet by mouth daily.  Flaxseed, Linseed, (FLAX SEED OIL) 1000 MG CAPS Take 1 capsule by mouth 2 times daily. No current facility-administered medications for this visit. No orders of the defined types were placed in this encounter. All medications reviewed and reconciled, including OTC and herbal medications. Updated list given to patient. Patient Active Problem List    Diagnosis Date Noted    ASHD (arteriosclerotic heart disease)     Constipation     DDD (degenerative disc disease), lumbar     Dyslipidemia     Glaucoma     History of vertebral compression fracture; L1     Hypertension, essential     S/P PTCA: 2/2/2015: Stenting mid-LAD using Xience Alpine 2.75 X 28 mm, post-dilated to 3.0 mm distally and 3.18 mm proximally. 02/02/2015 2/2/2015: Stenting mid-LAD using Xience Alpine 2.75 X 28 mm, post-dilated to 3.0 mm distally and 3.18 mm proximally.    Sherrie Riding      Tinnitus of both ears 11/19/2013    Status post tympanoplasty 06/24/2013    VMR (vasomotor rhinitis) 10/09/2012       Past Medical History:   Diagnosis Date    ASHD (arteriosclerotic heart disease)     Constipation     DDD (degenerative disc disease), lumbar     Dyslipidemia     Fibromyalgia     GERD (gastroesophageal reflux disease)     Glaucoma     History of vertebral compression fracture; L1     Hypertension, essential     Nausea & vomiting     Osteoporosis     S/P PTCA: 2/2/2015: Stenting mid-LAD using Xience Alpine 2.75 X 28 mm, post-dilated to 3.0 mm distally and 3.18 mm proximally. 02/02/2015 2/2/2015: Stenting mid-LAD using Xience Alpine 2.75 X 28 mm, post-dilated to 3.0 mm distally and 3.18 mm proximally.  Dr. Peña Speak Status post tympanoplasty 6/24/2013    Tinnitus of both ears 11/19/2013       Past Surgical History:   Procedure Laterality Date    APPENDECTOMY  1940    CARDIAC CATHETERIZATION  2015    stent x1    COLON SURGERY  1994    COLONOSCOPY  07/19/2005    EYE SURGERY  2009    left corneal transplant    EYE SURGERY  2008    right corneal transplant    EYE SURGERY      bilateral cataract    [facility] PAIN BLOCK Left 12/14/2018    Synvisc ONE LEFT knee performed by Win Casey MD at [address]    hysterectomy    KYPHOSIS SURGERY  2017    L-1 compression fracture    MIDDLE EAR SURGERY  2005    right tympnaoplasty by dr Rubén Bennett Right 05/03/2013       Allergies   Allergen Reactions    Codeine     Diazepam     Lisinopril     Simvastatin      Muscle aches    Valtrex [Valacyclovir Hcl]     Sulfa Antibiotics Rash       Social History     Tobacco Use    Smoking status: Never Smoker    Smokeless tobacco: Never Used   Substance Use Topics    Alcohol use: No       Family History   Problem Relation Age of Onset    Arthritis Mother     Heart Disease Father     Heart Disease Sister     Heart Disease Brother  Heart Disease Sister     Heart Disease Sister        I have reviewed the patient's past medical history, past surgical history, allergies, medications, social and family history and I have made updates where appropriate. Review of Systems  Positive responses are highlighted in bold    Constitutional:  Fever, Chills, Night Sweats, Fatigue, Unexpected changes in weight  HENT:  Ear pain, Tinnitus, Nosebleeds, Trouble swallowing, Hearing loss, Sore throat  Cardiovascular:  Chest Pain, Palpitations, Orthopnea, Paroxysmal Nocturnal Dyspnea  Respiratory:  Cough, Wheezing, Shortness of breath, Chest tightness, Apnea  Gastrointestinal:  Nausea, Vomiting, Diarrhea, Constipation, Heartburn, Blood in stool  Genitourinary:  Difficulty or painful urination, Flank pain, Change in frequency, Urgency  Skin:  Color change, Rash, Itching, Wound  Musculoskeletal:  Joint pain, Back pain, Gait problems, Joint swelling, Myalgias  Neurological:  Dizziness, Headaches, Presyncope, Numbness, Seizures, Tremors  Endocrine:  Heat Intolerance, Cold Intolerance, Polydipsia, Polyphagia, Polyuria      PHYSICAL EXAM:  Vitals:    08/03/21 1409   BP: (!) 104/58   Pulse: 77   Resp: 14   Temp: 98.1 °F (36.7 °C)   TempSrc: Oral   SpO2: 98%   Weight: 124 lb (56.2 kg)   Height: 4' 9\" (1.448 m)     Body mass index is 26.83 kg/m².   Pain Score:   0 - No pain    VS Reviewed  General Appearance: A&O to person only, No acute distress,well developed and well- nourished  Eyes: pupils equal, round, and reactive to light, extraocular eye movements intact, conjunctivae and eye lids without erythema  ENT: external ear and ear canal clear bilaterally, TMs intact and regular, nose without deformity, nasal mucosa and turbinates normal without polyps, oropharynx normal, dentition is normal for age  Neck: supple and non-tender without mass, no thyromegaly or thyroid nodules, no cervical lymphadenopathy  Pulmonary/Chest: clear to auscultation bilaterally- no wheezes, rales or rhonchi, normal air movement, no respiratory distress or retractions  Cardiovascular: S1 and S2 auscultated w/ RRR. No murmurs, rubs, clicks, or gallops, distal pulses intact. Abdomen: soft, non-tender, non-distended, bowel sounds physiologic,  no rebound or guarding, no masses or hernias noted. Liver and spleen without enlargement. Extremities: no cyanosis, clubbing or edema of the lower extremities. +2 PT/DP bilaterally. Musculoskeletal: No joint swelling or gross deformity   Psych: Affect appropriate. Mood normal. Thought process is normal without evidence of depression or psychosis. Good insight and appropriate interaction. Cognition and memory appear to be impaored. Skin: warm and dry, no rash or erythema      ASSESSMENT & PLAN  1. ASHD (arteriosclerotic heart disease)    Stable  con't current meds, DAPT, toprol, Valsartan and statin  Labs UTD  F/u cardio as well    2. Hypertension, essential    At goal  con't meds  Labs UTD    3. Dyslipidemia    Stable  con't lipitor     4. Gastroesophageal reflux disease, unspecified whether esophagitis present    Stable  con't pantoprazole    5. Fibromyalgia    Stable  con't fibro    6. Glaucoma, unspecified glaucoma type, unspecified laterality    con't timolol drops. 7. Vertigo    Unclear etiology  Doing better now  If sxs worsen again, daughter will call and will get setup with vestibular rehab at [facility]    8. Fatigue, unspecified type    Unclear etiology  Start with labs  F/u based on results    - CBC Auto Differential; Future  - Basic Metabolic Panel; Future  - TSH with Reflex; Future        DISPOSITION    Return in about 6 months (around 2/3/2022) for follow-up on chronic medical conditions, sooner as needed. Risa Settles released without restrictions.     Future Appointments   Date Time Provider Bj Ahuja   2/7/2022  2:00 PM Fede Cardoza, [address] [facility] - [address] [facility]   5/19/2022 11:15 AM [address], MD [address] [facility] PATIENT COUNSELING    Barriers to learning and self management: [place], daughter present. Discussed use, benefit, and side effects of prescribed medications. Barriers to medication compliance addressed. All patient questions answered. Pt voiced understanding. Electronically signed by Fede Cardoza DO on 8/3/2021 at 2:34 PM        Medicare Annual Wellness Visit  Name: Ha Huynh Date: 8/3/2021   MRN: [id number] Sex: Female   Age: 80 y.o. Ethnicity: Non- / Non    : 1928 Race: White (non-)      Freya Godinez is here for Medicare AWV, Follow-up (chroninc), Other (daughter wants to discuss audiology -  [place] ), and Fatigue    Screenings for behavioral, psychosocial and functional/safety risks, and cognitive dysfunction are all negative except as indicated below. These results, as well as other patient data from the [address] form, are documented in Flowsheets linked to this Encounter. Allergies   Allergen Reactions    Codeine     Diazepam     Lisinopril     Simvastatin      Muscle aches    Valtrex [Valacyclovir Hcl]     Sulfa Antibiotics Rash         Prior to Visit Medications    Medication Sig Taking?  Authorizing Provider   gabapentin (NEURONTIN) 400 MG capsule TAKE 1 CAPSULE BY MOUTH THREE TIMES DAILY Yes Jeff Rodriguez DO   pantoprazole (PROTONIX) 40 MG tablet Take 1 tablet by mouth once daily Yes [address], MD   clopidogrel (PLAVIX) 75 MG tablet Take 1 tablet by mouth once daily Yes [address], MD   metoprolol succinate (TOPROL XL) 25 MG extended release tablet TAKE 1 & 1/2 (ONE & ONE-HALF) TABLETS BY MOUTH ONCE DAILY Yes [address], MD   atorvastatin (LIPITOR) 10 MG tablet Take 1 tablet by mouth once daily Yes [address], MD   amLODIPine (NORVASC) 5 MG tablet Take 1 tablet by mouth once daily Yes [address], MD   valsartan (DIOVAN) 320 MG tablet Take 1 tablet by mouth once daily Yes fracture; L1     Hypertension, essential     Nausea & vomiting     Osteoporosis     S/P PTCA: 2/2/2015: Stenting mid-LAD using Xience Alpine 2.75 X 28 mm, post-dilated to 3.0 mm distally and 3.18 mm proximally. 02/02/2015 2/2/2015: Stenting mid-LAD using Xience Alpine 2.75 X 28 mm, post-dilated to 3.0 mm distally and 3.18 mm proximally. Dr. Richard Carlin Status post tympanoplasty 6/24/2013    Tinnitus of both ears 11/19/2013       Past Surgical History:   Procedure Laterality Date    APPENDECTOMY  1940    CARDIAC CATHETERIZATION  2015    stent x1    COLON SURGERY  1994    COLONOSCOPY  07/19/2005    EYE SURGERY  2009    left corneal transplant    EYE SURGERY  2008    right corneal transplant    EYE SURGERY      bilateral cataract    [facility] PAIN BLOCK Left 12/14/2018    [facility]    [facility]ste[facility]    KYPHOSIS SURGERY  2017    L-1 compression fracture    MIDDLE EAR SURGERY  2005    right tympnaoplasty by dr Malinda Smith Right 05/03/2013         Family History   Problem Relation Age of Onset    Arthritis Mother     Heart Disease Father     Heart Disease Sister     Heart Disease Brother     Heart Disease Sister     Heart Disease Sister        CareTeam (Including outside providers/suppliers regularly involved in providing care):   Patient Care Team:  Ramos Mason DO as PCP - General (Family Medicine)  Starreggie Mason DO as PCP - [facility] Empaneled Provider    Wt Readings from Last 3 Encounters:   08/03/21 124 lb (56.2 kg)   05/20/21 125 lb 12.8 oz (57.1 kg)   01/28/21 125 lb 12.8 oz (57.1 kg)     Vitals:    08/03/21 1409   BP: (!) 104/58   Pulse: 77   Resp: 14   Temp: 98.1 °F (36.7 °C)   TempSrc: Oral   SpO2: 98%   Weight: 124 lb (56.2 kg)   Height: 4' 9\" (1.448 m)     Body mass index is 26.83 kg/m².     Based upon direct observation of the patient, evaluation of cognition reveals remote memory intact, recent memory impaired. Patient's complete Health Risk Assessment and screening values have been reviewed and are found in Flowsheets. The following problems were reviewed today and where indicated follow up appointments were made and/or referrals ordered. Positive Risk Factor Screenings with Interventions:     Fall Risk:  Timed Up and Go Test > 12 seconds? (Complete if either Fall Risk answers are Yes): (!) yes  2 or more falls in past year?: no  Fall with injury in past year?: no  Fall Risk Interventions:    · Home safety tips provided  · lives at [place]    Cognitive: Words recalled: 0 Words Recalled  Clock Drawing Test (CDT) Score: (!) Abnormal  Total Score Interpretation: Positive Mini-Cog  Cognitive Impairment Interventions:  · known cog decline. Lives in [facility] and ACP:  General  In general, how would you say your health is?: Good  In the past 7 days, have you experienced any of the following?  New or Increased Pain, New or Increased Fatigue, Loneliness, Social Isolation, Stress or Anger?: (!) New or Increased Fatigue  Do you get the social and emotional support that you need?: Yes  Do you have a Living Will?: Yes  Advance Directives     Power of  Living Will ACP-Advance Directive ACP-Power of     Not on File Filed on 12/14/18 Filed Not on File      General Health Risk Interventions:  · Fatigue: see above    Health Habits/Nutrition:  Health Habits/Nutrition  Do you exercise for at least 20 minutes 2-3 times per week?: (!) No  Have you lost any weight without trying in the past 3 months?: No  Do you eat only one meal per day?: No  Have you seen the dentist within the past year?: Yes  Body mass index: (!) 26.83  Health Habits/Nutrition Interventions:  · Inadequate physical activity:  patient is not ready to increase his/her physical activity level at this time  · Nutritional issues:  educational materials for healthy, well-balanced diet provided    Hearing/Vision:  No  Hib vaccine  Aged Out    Meningococcal (ACWY) vaccine  Aged Out     Recommendations for Clixtr Due: see orders and patient instructions/AVS.    Recommended screening schedule for the next 5-10 years is provided to the patient in written form: see Patient Tristin Andrew was seen today for medicare awv, follow-up and other. Diagnoses and all orders for this visit:      Routine general medical examination at a health care facility      Care plan reviewed with and given to patient.        Electronically signed by Leann Bob DO on 8/3/2021 at 2:34 PM

## 2021-08-03 ENCOUNTER — OFFICE VISIT (OUTPATIENT)
Dept: FAMILY MEDICINE CLINIC | Age: 86
End: 2021-08-03
Payer: MEDICARE

## 2021-08-03 VITALS
SYSTOLIC BLOOD PRESSURE: 104 MMHG | RESPIRATION RATE: 14 BRPM | HEART RATE: 77 BPM | HEIGHT: 57 IN | OXYGEN SATURATION: 98 % | WEIGHT: 124 LBS | BODY MASS INDEX: 26.75 KG/M2 | DIASTOLIC BLOOD PRESSURE: 58 MMHG | TEMPERATURE: 98.1 F

## 2021-08-03 DIAGNOSIS — I10 HYPERTENSION, ESSENTIAL: ICD-10-CM

## 2021-08-03 DIAGNOSIS — K21.9 GASTROESOPHAGEAL REFLUX DISEASE, UNSPECIFIED WHETHER ESOPHAGITIS PRESENT: ICD-10-CM

## 2021-08-03 DIAGNOSIS — H40.9 GLAUCOMA, UNSPECIFIED GLAUCOMA TYPE, UNSPECIFIED LATERALITY: ICD-10-CM

## 2021-08-03 DIAGNOSIS — E78.5 DYSLIPIDEMIA: ICD-10-CM

## 2021-08-03 DIAGNOSIS — R53.83 FATIGUE, UNSPECIFIED TYPE: ICD-10-CM

## 2021-08-03 DIAGNOSIS — R42 VERTIGO: ICD-10-CM

## 2021-08-03 DIAGNOSIS — I25.10 ASHD (ARTERIOSCLEROTIC HEART DISEASE): ICD-10-CM

## 2021-08-03 DIAGNOSIS — Z00.00 ROUTINE GENERAL MEDICAL EXAMINATION AT A HEALTH CARE FACILITY: Primary | ICD-10-CM

## 2021-08-03 DIAGNOSIS — M79.7 FIBROMYALGIA: ICD-10-CM

## 2021-08-03 PROCEDURE — 99213 OFFICE O/P EST LOW 20 MIN: CPT | Performed by: FAMILY MEDICINE

## 2021-08-03 PROCEDURE — G0438 PPPS, INITIAL VISIT: HCPCS | Performed by: FAMILY MEDICINE

## 2021-08-03 ASSESSMENT — PATIENT HEALTH QUESTIONNAIRE - PHQ9
SUM OF ALL RESPONSES TO PHQ QUESTIONS 1-9: 0
1. LITTLE INTEREST OR PLEASURE IN DOING THINGS: 0
2. FEELING DOWN, DEPRESSED OR HOPELESS: 0
SUM OF ALL RESPONSES TO PHQ9 QUESTIONS 1 & 2: 0

## 2021-08-03 ASSESSMENT — LIFESTYLE VARIABLES: HOW OFTEN DO YOU HAVE A DRINK CONTAINING ALCOHOL: 0

## 2021-08-03 NOTE — PATIENT INSTRUCTIONS
Personalized Preventive Plan for Earle Gallegos - 8/3/2021  Medicare offers a range of preventive health benefits. Some of the tests and screenings are paid in full while other may be subject to a deductible, co-insurance, and/or copay. Some of these benefits include a comprehensive review of your medical history including lifestyle, illnesses that may run in your family, and various assessments and screenings as appropriate. After reviewing your medical record and screening and assessments performed today your provider may have ordered immunizations, labs, imaging, and/or referrals for you. A list of these orders (if applicable) as well as your Preventive Care list are included within your After Visit Summary for your review. Other Preventive Recommendations:    · A preventive eye exam performed by an eye specialist is recommended every 1-2 years to screen for glaucoma; cataracts, macular degeneration, and other eye disorders. · A preventive dental visit is recommended every 6 months. · Try to get at least 150 minutes of exercise per week or 10,000 steps per day on a pedometer . · Order or download the FREE \"Exercise & Physical Activity: Your Everyday Guide\" from [employer]. Call 0-562.570.1745 or search [employer] online. · You need 7746-3430 mg of calcium and 4329-8335 IU of vitamin D per day. It is possible to meet your calcium requirement with diet alone, but a vitamin D supplement is usually necessary to meet this goal.  · When exposed to the sun, use a sunscreen that protects against both UVA and UVB radiation with an SPF of 30 or greater. Reapply every 2 to 3 hours or after sweating, drying off with a towel, or swimming. · Always wear a seat belt when traveling in a car. Always wear a helmet when riding a bicycle or motorcycle.

## 2021-08-17 ENCOUNTER — TELEPHONE (OUTPATIENT)
Dept: FAMILY MEDICINE CLINIC | Age: 86
End: 2021-08-17

## 2021-08-17 DIAGNOSIS — D64.9 NORMOCYTIC ANEMIA: Primary | ICD-10-CM

## 2021-08-17 NOTE — TELEPHONE ENCOUNTER
Sandor Olvera informed and verbalized understanding.   Sandor Olvera is not aware of any bleeding or blood/black stool, she will check with pt and let us know  Lab orders faxed to [facility] [address]  [place] @ [facility] informed of the orders

## 2021-08-17 NOTE — TELEPHONE ENCOUNTER
Please let pt/family know that labs just came back  All ok other than has new anemia that was not present on previous labs. It's not clear the cause at this point. Any bleeding, blood in stool or black stools anyone is aware of?    I've ordered some f/u fasting labs to help determine cause of anemia. Let me know if questions, thanks! Diagnosis Orders   1.  Normocytic anemia  CBC Auto Differential    Ferritin    Folate    Iron    Iron Binding Capacity    Reticulocytes    Vitamin B12

## 2021-08-30 RX ORDER — METOPROLOL SUCCINATE 25 MG/1
TABLET, EXTENDED RELEASE ORAL
Qty: 60 TABLET | Refills: 9 | Status: SHIPPED | OUTPATIENT
Start: 2021-08-30 | End: 2022-08-02 | Stop reason: SDUPTHER

## 2021-08-31 ENCOUNTER — TELEPHONE (OUTPATIENT)
Dept: FAMILY MEDICINE CLINIC | Age: 86
End: 2021-08-31

## 2021-08-31 NOTE — TELEPHONE ENCOUNTER
Great  Fax that result once it's available  Need that before I can proceed further with anemia w/u  Thanks!

## 2021-08-31 NOTE — TELEPHONE ENCOUNTER
----- Message from Alysia Chance, DO sent at 8/30/2021  7:29 PM EDT -----  Please let pt know that labs received today  Anemia present yet  Not clear why  Rest of labs ok other than b12 was not run  Can you check with [facility] to see if they have a b12 result over there they didn't send? If not, that needs drawn yet.

## 2021-09-09 ENCOUNTER — TELEPHONE (OUTPATIENT)
Dept: FAMILY MEDICINE CLINIC | Age: 86
End: 2021-09-09

## 2021-09-09 DIAGNOSIS — D51.9 ANEMIA DUE TO VITAMIN B12 DEFICIENCY, UNSPECIFIED B12 DEFICIENCY TYPE: Primary | ICD-10-CM

## 2021-09-09 NOTE — TELEPHONE ENCOUNTER
----- Message from Max Chin DO sent at 9/9/2021  7:15 AM EDT -----  Pt was to have a b12 level drawn 9/1 at [facility]. I don't have the results yet  Can you f/u with her facility and see where we are with this? Let me know, thanks!

## 2021-09-10 NOTE — TELEPHONE ENCOUNTER
Emiliano @ [facility] informed and verbalized understanding  Lab orders faxed to [facility] @ [phone]

## 2021-09-10 NOTE — TELEPHONE ENCOUNTER
[id number] Mayda Longo  That is borderline low  Recommend start B12 1000mcg po daily   Repeat cbc/b12 in 6 wks, fasting  Let me know if questions, thanks! Diagnosis Orders   1.  Anemia due to vitamin B12 deficiency, unspecified B12 deficiency type  Vitamin B12    cyanocobalamin ([facility] VITAMIN B12) 1000 MCG tablet    CBC Auto Differential

## 2021-10-07 ENCOUNTER — TELEPHONE (OUTPATIENT)
Dept: FAMILY MEDICINE CLINIC | Age: 86
End: 2021-10-07

## 2021-10-07 DIAGNOSIS — R42 VERTIGO: Primary | ICD-10-CM

## 2021-10-07 RX ORDER — MECLIZINE HCL 12.5 MG/1
12.5 TABLET ORAL 3 TIMES DAILY PRN
Qty: 60 TABLET | Refills: 1 | Status: SHIPPED | OUTPATIENT
Start: 2021-10-07

## 2021-10-07 NOTE — TELEPHONE ENCOUNTER
Spoke to Vanessa Crainor (pt daughter), Okay per HIPAA. States that she is okay with the prn meclizine. She is okay with the vestibular rehab. But does not know how she would do the testing because she can not see, hear or comprehend things very well.'      Okay to go through [facility].

## 2021-10-07 NOTE — TELEPHONE ENCOUNTER
Received fax from "[facility] about Lynne's dizziness. Asking for prn med. Can you reach out to daughter and see if they are ok with some prn meclizine. We'd also discussed referring to vestibular rehab if the dizziness returned. Any interest there? Let me know about above, thanks!

## 2021-10-07 NOTE — TELEPHONE ENCOUNTER
I referral has been placed for vestibular rehab already (see previous note), this is done via physical therapy at [facility].     Diagnosis Orders   1.  Vertigo  [facility]    meclizine (ANTIVERT) 12.5 MG tablet

## 2021-10-07 NOTE — TELEPHONE ENCOUNTER
Pt's daughter would like to try the PT for the patient  and would like to know how to go about that.

## 2021-10-07 NOTE — TELEPHONE ENCOUNTER
No worries    I agree with not testing, but PT may be able to help with resetting the crystals if that's an issue    Meclizine sent    Let me know if questions, thanks! Diagnosis Orders   1.  Vertigo  [facility]    meclizine (ANTIVERT) 12.5 MG tablet

## 2021-10-14 ENCOUNTER — HOSPITAL ENCOUNTER (OUTPATIENT)
Dept: PHYSICAL THERAPY | Age: 86
Setting detail: THERAPIES SERIES
Discharge: HOME | End: 2021-10-14
Payer: MEDICARE

## 2021-10-14 PROCEDURE — 97162 PT EVAL MOD COMPLEX 30 MIN: CPT

## 2021-10-14 NOTE — PROGRESS NOTES
** PLEASE SIGN, DATE AND TIME CERTIFICATION BELOW AND RETURN TO [facility] OUTPATIENT REHABILITATION (FAX #: [phone]). ATTEST/CO-SIGN IF ACCESSING VIA [employer]. THANK YOU.**    I certify that I have examined the patient below and determined that Physical Medicine and Rehabilitation service is necessary and that I approve the established plan of care for up to 90 days or as specifically noted. Attestation, signature or co-signature of physician indicates approval of certification requirements.    ________________________ ____________ __________  Physician Signature   Date   Time  Arley Barber 60  PHYSICAL THERAPY  [x] VESTIBULAR EVALUATION  [] DAILY NOTE [] PROGRESS NOTE [] DISCHARGE NOTE    [x] OUTPATIENT REHABILITATION [facility]   [] [place] [address]    [] [facility]   [] Roscoe Lu    Date: 10/14/2021  Patient Name:  Kelley Decker  : 1928  MRN: [id number]  CSN: [id number]    Referring Practitioner Tristen Montes DO   Diagnosis Dizziness and giddiness [R42]    Treatment Diagnosis BPPV   Date of Evaluation 10/14/21   Additional Pertinent History HTN, vertigo, arthritis, memory issues/dementia, osteoporosis. Functional Outcome Measure Used I   Functional Outcome Score 26/100 (10/14/21)       Insurance: Primary: Payor: Tali Ruth /  /  / ,   Secondary:    Authorization Information: PRE CERTIFICATION REQUIRED: YES, AFTER EVAL WITH AIM   INSURANCE THERAPY BENEFIT:     AQUATIC THERAPY COVERED: YES  MODALITIES COVERED:  YES, NO IONTO   Visit # 1, 1/10 for progress note   Visits Allowed: eval only   Recertification Date: 30   Physician Follow-Up: 6 months for follow up of chronic conditions   Physician Orders:    History of Present Illness: Pt presents with vertigo. SUBJECTIVE: Pt presents with daughter, Lillie Lowe, whom states Dr. Joey Claros prescribed Meclizine. Pt feels the Meclizine is taking the dizziness away.  Daughter reports, patients  noted she c/o dizziness this morning and then was given Meclizine. Dizziness 2 days ago and over the last couple months its more frequent. Daughter notes pt c/o dizziness with any change in positions. Pt denies any recent illness. Social/Functional History and Current Status:  Medications and Allergies have been reviewed and are listed on Medical History Questionnaire. Judy Victoria lives with spouse in an assisted living facility [facility] with a level surface to enter.     Task Previous Current   ADLs  Modified Independent Modified Independent- assist with showering from staff   IADL's staff does most of the chores, can do light dishes Assistance Required   Ambulation Modified Independent- uses RW, blind left eye, minimal vision in right eye Modified Independent   Transfers Modified Independent Modified Independent   Recreation Not Applicable Not Applicable   Community Integration Not Applicable Not Applicable   Driving Does not drive Does not drive   Work Retired  Retired       Precautions:     Pain Denies neck pain, reports headaches when she was dizzy   Neck ROM AROM WFL   Vertebral Artery Testing Negative   Visual Field NT d/t poor vision   Oculomotor/VOR Tracking and saccades normal   Balance    Gait Ambulates with RW, forward flexed posture   Special Testing BPPV [place] Hallpike: positive to left, negative to right  Roll test: negative B   Hearing/Ear Pain      TREATMENT   Precautions: Poor vision, dementia   Pain:     X in shaded column indicates activity completed today   Modalities Parameters/  Location  Notes                     Manual Therapy Time/Technique  Notes                     Exercise/Intervention   Notes   Reynaldo Hallpike: positive to left   x    Epley maneuver for left posterior canal 1x  x                                                                     Specific Interventions Next Treatment: reassess Edinson Lee, treat appropriately    Activity/Treatment Tolerance:  [x] Patient tolerated treatment well  []  Patient limited by fatigue  []  Patient limited by pain   []  Patient limited by medical complications  []  Other:     Assessment: 80year old presents with BPPV affecting left ear. [place] Hallpike positive so Epley maneuver performed. Oculomotor exam normal and negative vertebral artery test. Pt was reluctant to do testing but with encouragement was willing to participate. Pt has poor understanding of vertigo d/t dementia. Recommended to daughter, that staff only give Meclizine if patient c/o dizziness. Body Structures/Functions/Activity Limitations:vestibular dysfunction  Prognosis: good    GOALS:  Patient Goal: eliminate dizziness (pt, spouse and daughter goal)    Short Term Goals:  Time Frame: defer to LTGs      Long Term Goals:  Time Frame: 8 weeks  Patient will have negative left Reynaldo Hallpike test to allow for safe transitional motions. Patient will reduce Dizziness Handicap Inventory score from 26/100 to <10/100 to tolerate any position change. Patient Education:   [x]  HEP/Education Completed: Plan of Care, Goals, attendance policy   [employer] Access Code:  []  No new Education completed  []  Reviewed Prior HEP      [x]  Patient verbalized and/or demonstrated understanding of education provided. []  Patient unable to verbalize and/or demonstrate understanding of education provided. Will continue education. []  Barriers to learning: dementia, blindness, [place]    PLAN:  Treatment Recommendations: Home Exercise Program, Patient Education and Vestibular Rehabilitation    [x]  Plan of care initiated. Plan to see patient 1 times per week for 8 weeks to address the treatment planned outlined above.   []  Continue with current plan of care  []  Modify plan of care as follows:    []  Hold pending physician visit  []  Discharge    Time In 1120   Time Out 1155   Timed Code Minutes: 0 min   Total Treatment Time: 35 min       Electronically Signed by: Dalia Cramer PT

## 2021-10-27 DIAGNOSIS — D51.9 ANEMIA DUE TO VITAMIN B12 DEFICIENCY, UNSPECIFIED B12 DEFICIENCY TYPE: Primary | ICD-10-CM

## 2021-10-28 ENCOUNTER — TELEPHONE (OUTPATIENT)
Dept: FAMILY MEDICINE CLINIC | Age: 86
End: 2021-10-28

## 2021-10-28 NOTE — TELEPHONE ENCOUNTER
----- Message from Pancho Pérez, DO sent at 10/27/2021  6:57 PM EDT -----  Please let pt/ know that cbc is showing improvement as is b12  Con't supplementation with b12  Repeat labs again in 6 wks  Let me know if questions, thanks!

## 2021-10-28 NOTE — TELEPHONE ENCOUNTER
Called and spoke with . Advised of results. He voiced understanding with no questions. Labs sent to home address.

## 2021-11-02 ENCOUNTER — HOSPITAL ENCOUNTER (OUTPATIENT)
Dept: PHYSICAL THERAPY | Age: 86
Setting detail: THERAPIES SERIES
Discharge: HOME | End: 2021-11-02
Payer: MEDICARE

## 2021-11-02 PROCEDURE — 95992 CANALITH REPOSITIONING PROC: CPT

## 2021-11-02 NOTE — PROGRESS NOTES
Arley Barber 60  PHYSICAL THERAPY  [] VESTIBULAR EVALUATION  [x] DAILY NOTE [] PROGRESS NOTE [] DISCHARGE NOTE    [x] OUTPATIENT REHABILITATION CENTER - [place]   [] [place] [address]    [] [facility]   [] Severiano Stapler    Date: 2021  Patient Name:  Merlin Landis  : 1928  MRN: [id number]  CSN: [id number]    Referring Practitioner Apollo Hall DO   Diagnosis Dizziness and giddiness [R42]    Treatment Diagnosis BPPV   Date of Evaluation 10/14/21   Additional Pertinent History HTN, vertigo, arthritis, memory issues/dementia, osteoporosis. Functional Outcome Measure Used DHI   Functional Outcome Score 26/100 (10/14/21)       Insurance: Primary: Payor: Arin San /  /  / ,   Secondary:    Authorization Information: PRE CERTIFICATION REQUIRED: YES, AFTER EVAL WITH AIM   INSURANCE THERAPY BENEFIT:     AQUATIC THERAPY COVERED: YES  MODALITIES COVERED:  YES, NO IONTO  RECEIVED AUTH FOR 6 VISITS FROM 10/15/21-21  CPT CODES: 32005 AND 09996; 26472 code doesn't need precert   Visit # 2,  for progress note   Visits Allowed: eval only   Recertification Date:    Physician Follow-Up: 6 months for follow up of chronic conditions   Physician Orders:    History of Present Illness: Pt presents with vertigo. SUBJECTIVE: Pt presents with daughter. Daughter notes patients  reports pt c/o dizziness when getting up out of bed, but feels its better. Pt denies current dizziness.        OBJECTIVE:  TREATMENT   Precautions: Poor vision, dementia   Pain:     X in shaded column indicates activity completed today   Modalities Parameters/  Location  Notes                     Manual Therapy Time/Technique  Notes                     Exercise/Intervention   Notes   [place] Hallpike: positive to left   x    Epley maneuver for left posterior canal 2x  x Held 1 min, dizzy rolling to right each time                                                                    Specific Interventions Next Treatment: reassess Francesco Hollingsworth, treat appropriately    Activity/Treatment Tolerance:  [x]  Patient tolerated treatment well  []  Patient limited by fatigue  []  Patient limited by pain   []  Patient limited by medical complications  []  Other:     Assessment: Francesco Hollingsworth remained position to left so treated with Epley maneuver twice. Pt c/o dizziness when rolling to right each time. Plan to recheck next week. GOALS:  Patient Goal: eliminate dizziness (pt, spouse and daughter goal)    Short Term Goals:  Time Frame: defer to LTGs      Long Term Goals:  Time Frame: 8 weeks  Patient will have negative left [place] Hallpike test to allow for safe transitional motions. Patient will reduce Dizziness Handicap Inventory score from 26/100 to <10/100 to tolerate any position change. Patient Education:   [x]  HEP/Education Completed: monitor symptoms   [employer] labs Access Code:  []  No new Education completed  []  Reviewed Prior HEP      [x]  Patient verbalized and/or demonstrated understanding of education provided. []  Patient unable to verbalize and/or demonstrate understanding of education provided. Will continue education. [x]  Barriers to learning: dementia, blindness, [place]    PLAN:  Treatment Recommendations: Home Exercise Program, Patient Education and Vestibular Rehabilitation    []  Plan of care initiated. Plan to see patient 1 times per week for 8 weeks to address the treatment planned outlined above.   [x]  Continue with current plan of care  []  Modify plan of care as follows:    []  Hold pending physician visit  []  Discharge    Time In 1100   Time Out 1120   Timed Code Minutes: 0 min   Total Treatment Time: 20 min       Electronically Signed by: Krista Shukla PT

## 2021-11-08 ENCOUNTER — HOSPITAL ENCOUNTER (OUTPATIENT)
Dept: PHYSICAL THERAPY | Age: 86
Setting detail: THERAPIES SERIES
Discharge: HOME | End: 2021-11-08
Payer: MEDICARE

## 2021-11-08 PROCEDURE — 97110 THERAPEUTIC EXERCISES: CPT

## 2021-11-08 NOTE — PROGRESS NOTES
Arley Barber 60  PHYSICAL THERAPY  [] VESTIBULAR EVALUATION  [x] DAILY NOTE [] PROGRESS NOTE [] DISCHARGE NOTE    [x] OUTPATIENT REHABILITATION CENTER - [place]   [] [place] [address]    [] [facility]   [] Yeny Bending    Date: 2021  Patient Name:  Mikey Levine  : 1928  MRN: [id number]  CSN: [id number]    Referring Practitioner Tray Ortez DO   Diagnosis Dizziness and giddiness [R42]    Treatment Diagnosis BPPV   Date of Evaluation 10/14/21   Additional Pertinent History HTN, vertigo, arthritis, memory issues/dementia, osteoporosis. Functional Outcome Measure Used DHI   Functional Outcome Score 26/100 (10/14/21)       Insurance: Primary: Payor: Tyshawn 6 /  /  / ,   Secondary:    Authorization Information: PRE CERTIFICATION REQUIRED: YES, AFTER EVAL WITH AIM   INSURANCE THERAPY BENEFIT:     AQUATIC THERAPY COVERED: YES  MODALITIES COVERED:  YES, NO IONTO  RECEIVED AUTH FOR 6 VISITS FROM 10/15/21-21  CPT CODES: 40263 AND 31146; 95262 code doesn't need precert   Visit # 3, 3/7 for progress note   Visits Allowed: 7   Recertification Date:    Physician Follow-Up: 6 months for follow up of chronic conditions   Physician Orders:    History of Present Illness: Pt presents with vertigo. SUBJECTIVE: Pt presents with daughter. Daughter notes patient hasn't reported any dizziness for the past 2 days.  reports she c/o dizziness when she gets up out of bed.        OBJECTIVE:  TREATMENT   Precautions: Poor vision, dementia   Pain:     X in shaded column indicates activity completed today   Modalities Parameters/  Location  Notes                     Manual Therapy Time/Technique  Notes                     Exercise/Intervention   Notes   Reynaldo Hallpike: negative B   x    Epley maneuver for left posterior canal 2x   Held 1 min, dizzy rolling to right each time   Roll test: negative B   x Specific Interventions Next Treatment: reassess Chela Butts, treat appropriately    Activity/Treatment Tolerance:  [x]  Patient tolerated treatment well  []  Patient limited by fatigue  []  Patient limited by pain   []  Patient limited by medical complications  []  Other:     Assessment: [place] Hallpike and roll tests negative bilaterally. Pt reported quick burst of dizziness, \"off feeling\" while sitting EOB at end of session. Pt to monitor symptoms and plan to recheck in a week. GOALS:  Patient Goal: eliminate dizziness (pt, spouse and daughter goal)    Short Term Goals:  Time Frame: defer to LTGs      Long Term Goals:  Time Frame: 8 weeks  Patient will have negative left Reynaldo Hallpike test to allow for safe transitional motions. Patient will reduce Dizziness Handicap Inventory score from 26/100 to <10/100 to tolerate any position change. Patient Education:   [x]  HEP/Education Completed: monitor symptoms   [employer] Access Code:  []  No new Education completed  []  Reviewed Prior HEP      [x]  Patient verbalized and/or demonstrated understanding of education provided. []  Patient unable to verbalize and/or demonstrate understanding of education provided. Will continue education. [x]  Barriers to learning: dementia, blindness, [place]    PLAN:  Treatment Recommendations: Home Exercise Program, Patient Education and Vestibular Rehabilitation    []  Plan of care initiated. Plan to see patient 1 times per week for 8 weeks to address the treatment planned outlined above.   [x]  Continue with current plan of care  []  Modify plan of care as follows:    []  Hold pending physician visit  []  Discharge    Time In 1350   Time Out 1402   Timed Code Minutes: 12 min   Total Treatment Time: 12 min       Electronically Signed by: Darren Acevedo, PT

## 2021-11-16 ENCOUNTER — HOSPITAL ENCOUNTER (OUTPATIENT)
Dept: PHYSICAL THERAPY | Age: 86
Setting detail: THERAPIES SERIES
Discharge: HOME | End: 2021-11-16
Payer: MEDICARE

## 2021-11-16 PROCEDURE — 95992 CANALITH REPOSITIONING PROC: CPT

## 2021-11-16 NOTE — PROGRESS NOTES
Arley Barber 60  PHYSICAL THERAPY  [] VESTIBULAR EVALUATION  [x] DAILY NOTE [] PROGRESS NOTE [] DISCHARGE NOTE    [x] OUTPATIENT REHABILITATION CENTER [facility]   [] [place] [address]    [] [facility]   [] [facility]    Date: 2021  Patient Name:  Genesis Rowe  : 1928  MRN: [id number]  CSN: [id number]    Referring Practitioner Murphy Gonzalez DO   Diagnosis Dizziness and giddiness [R42]    Treatment Diagnosis BPPV   Date of Evaluation 10/14/21   Additional Pertinent History HTN, vertigo, arthritis, memory issues/dementia, osteoporosis. Functional Outcome Measure Used DHI   Functional Outcome Score 26/100 (10/14/21)       Insurance: Primary: Payor: Esther Lu /  /  / ,   Secondary:    Authorization Information: PRE CERTIFICATION REQUIRED: YES, AFTER EVAL WITH AIM   INSURANCE THERAPY BENEFIT:     AQUATIC THERAPY COVERED: YES  MODALITIES COVERED:  YES, NO IONTO  RECEIVED AUTH FOR 6 VISITS FROM 10/15/21-21  CPT CODES: 87646 AND 90385; 53500 code doesn't need precert   Visit # 4, 4/7 for progress note   Visits Allowed: 7   Recertification Date:    Physician Follow-Up: 6 months for follow up of chronic conditions   Physician Orders:    History of Present Illness: Pt presents with vertigo. SUBJECTIVE: Pt presents with daughter. Pt notes she is feeling good. Daughter reports  said pt c/o dizziness when she gets out of bed every morning but otherwise doesn't complain of it.        OBJECTIVE:  TREATMENT   Precautions: Poor vision, dementia   Pain:     X in shaded column indicates activity completed today   Modalities Parameters/  Location  Notes                     Manual Therapy Time/Technique  Notes                     Exercise/Intervention   Notes   [place] Hallpike: negative right, positive L   x Quick burst   Epley maneuver for left posterior canal 1x  x Held 30 sec in each position   Roll test: negative B   x Specific Interventions Next Treatment: reassess Cheri Rea, treat appropriately    Activity/Treatment Tolerance:  [x]  Patient tolerated treatment well  []  Patient limited by fatigue  []  Patient limited by pain   []  Patient limited by medical complications  []  Other:     Assessment: Cheri Rea positive to left for quick burst of dizziness so treated with Epley maneuver. Pt tolerated well. Plan for patient to monitor symptoms over the next 2 weeks. GOALS:  Patient Goal: eliminate dizziness (pt, spouse and daughter goal)    Short Term Goals:  Time Frame: defer to LTGs      Long Term Goals:  Time Frame: 8 weeks  Patient will have negative left Reynaldo Hallpike test to allow for safe transitional motions. Patient will reduce Dizziness Handicap Inventory score from 26/100 to <10/100 to tolerate any position change. Patient Education:   [x]  HEP/Education Completed: monitor symptoms   [employer] Access Code:  []  No new Education completed  []  Reviewed Prior HEP      [x]  Patient verbalized and/or demonstrated understanding of education provided. []  Patient unable to verbalize and/or demonstrate understanding of education provided. Will continue education. [x]  Barriers to learning: dementia, blindness, [place]    PLAN:  Treatment Recommendations: Home Exercise Program, Patient Education and Vestibular Rehabilitation    []  Plan of care initiated. Plan to see patient 1 times per week for 8 weeks to address the treatment planned outlined above.   [x]  Continue with current plan of care  []  Modify plan of care as follows:    []  Hold pending physician visit  []  Discharge    Time In 1501   Time Out 1515   Timed Code Minutes: 0 min   Total Treatment Time: 14 min       Electronically Signed by: Maty Alvarez PT

## 2021-11-30 ENCOUNTER — HOSPITAL ENCOUNTER (OUTPATIENT)
Dept: PHYSICAL THERAPY | Age: 86
Setting detail: THERAPIES SERIES
Discharge: HOME | End: 2021-11-30
Payer: MEDICARE

## 2021-12-19 DIAGNOSIS — D51.9 ANEMIA DUE TO VITAMIN B12 DEFICIENCY, UNSPECIFIED B12 DEFICIENCY TYPE: Primary | ICD-10-CM

## 2021-12-20 ENCOUNTER — TELEPHONE (OUTPATIENT)
Dept: FAMILY MEDICINE CLINIC | Age: 86
End: 2021-12-20

## 2021-12-20 NOTE — TELEPHONE ENCOUNTER
----- Message from Polo Sanders, DO sent at 12/19/2021 11:23 AM EST -----  Please let pt/ know that cbc is stable  Would like to repeat labs 3 months, fasting  Let me know if questions, thanks!

## 2021-12-30 ENCOUNTER — TELEPHONE (OUTPATIENT)
Dept: FAMILY MEDICINE CLINIC | Age: 86
End: 2021-12-30

## 2021-12-30 DIAGNOSIS — N39.0 ACUTE URINARY TRACT INFECTION: Primary | ICD-10-CM

## 2021-12-30 RX ORDER — NITROFURANTOIN 25; 75 MG/1; MG/1
100 CAPSULE ORAL 2 TIMES DAILY
Qty: 10 CAPSULE | Refills: 0 | Status: SHIPPED | OUTPATIENT
Start: 2021-12-30 | End: 2022-01-04

## 2021-12-30 NOTE — TELEPHONE ENCOUNTER
Spoke to [facility] and she states she will contact the daughter and let them know of the antibiotic. She verified which Rx and the dosage/sig.

## 2021-12-30 NOTE — TELEPHONE ENCOUNTER
Just got message below this AM (not sure why this wasn't given to me sooner)    Appears resident may have a UTI. I'm going to have her start macrobid 100mg bid x 5 days  Let me know if questions, thanks! Diagnosis Orders   1.  Acute urinary tract infection  nitrofurantoin, macrocrystal-monohydrate, (MACROBID) 100 MG capsule

## 2022-02-06 NOTE — PROGRESS NOTES
Chief Complaint   Patient presents with    Follow-up     chronic issues as noted below    Hip Pain     Left    Rash     Hands     History obtained from daughter and the patient. SUBJECTIVE:  Cheryl Monge is a 80 y.o. female that presents today for     -L Hip:  Last 4 wks  Lateral L hip  Comes and goes  No pain today  Tylenol helps  Would like to do PT  No groin pain  nop falls      -Rash: On hands  Not clear how long been there  Dry  Red  No pain  Mild occ itch    Inciting events or exposures prior to rash starting? Yes - washes hands a lot  Pruritic? Very mild  Erythematous? Yes  Weeping or drainage? No  History of Urticaria? No  Fever? No  Painful? No  New Detergent? No        -CAD:    HPI:  Follows with cardio  No CP/sOB  Due for labs  On DAPT, BB, ARB and statin    History of myocardial infarction? Yes    History of heart failure? No  Current symptoms of angina? No   Patient compliant with therapy? Yes  Tobacco use? No      Antiplatelet therapy? Yes    Beta-blocker therapy?   Yes   ACE/ARB therapy - Yes      -HTN:    HPI:    Taking meds as prescribed ?: yes  Tolerating well ?: yes  Side Effects ?: denies  BP at home ?: typically <140/90  Working on TLCS ?: yes  Chest Pain/SOB/Palpitations? denies    BP Readings from Last 3 Encounters:   02/07/22 128/64   08/03/21 (!) 104/58   05/20/21 138/62       -HLD:    HPI:    Taking meds as prescribed ?: yes  Tolerating well ?: yes  Side Effects ?: denies  Muscle Pain?: denies  Working on TLCS ?: yes      -GERD:    HPI:  Gets on and off nausea with it, but controlled with meds    Taking meds as prescribed ?: yes  Tolerating well ?: yes  Side Effects ?: deneis  Dysphagia ?: deneis  Odynophagia ?: denies  Melena ?: denies  Working on TLCS ?: yes      -Fibro:  On ro  Works well enough  Pain controlled      -Glaucoma:  Mostly blind both eyes  On timolol  Seen optho, Misty Yusuf, in the past  But is just f/u prn now.       -Dizziness LAST VISIT  Worse a few months ago  Doing better now  Saw cardio, felt not related to cardio  Saw audio, did not do w/u d/t pt not being able to get done  They recommended vestib rehab, however, pt doing better  Having 1 episode every 2 wks or so  Doesn't last long  ? Vertigo    UPDATE TODAY:   Doing better  No recurrence yet.         -Fatigue:  Fatigue a bit better  Doesn't bother pt      -B12 def/anemia:  Noted on labs  Declined aggressive w/u  Is DNR/CC  On b12 now, labs slowly improving      Age/Gender Health Maintenance    Lipid -   Lab Results   Component Value Date    CHOL 160 04/06/2021    CHOL 154 04/01/2017    CHOL 180 08/31/2015     Lab Results   Component Value Date    TRIG 102 04/06/2021    TRIG 105 04/01/2017    TRIG 91 08/31/2015     Lab Results   Component Value Date    HDL 65 04/06/2021    HDL 67 04/01/2017    HDL 80 08/31/2015     Lab Results   Component Value Date    LDLCALC 75 04/06/2021    LDLCALC 66 04/01/2017    [address] 82 08/31/2015     Lab Results   Component Value Date    VLDL 20 04/06/2021     No results found for: CHOLHDLRATIO      DM Screen - 75 (APR 2021)    Lab Results   Component Value Date    GLUCOSE 107 05/20/2020    GLUCOSE 77 11/14/2018       Colon Cancer Screening - aged out  [address] (Age 54 to [de-identified] with 30 pack year hx, current smoker or quit within past 15 years) - aged out    Tetanus - to get at pharmacy per medicare rules  Influenza Vaccine - UTD FALL 2021  Pneumonia Vaccine - UTD  Zoster - to get at pharmacy per medicare rules     Breast Cancer Screening - aged out  Cervical Cancer Screening - aged out  Osteoporosis Screening - declines JAN 2021      Current Outpatient Medications   Medication Sig Dispense Refill    meclizine (ANTIVERT) 12.5 MG tablet Take 1 tablet by mouth 3 times daily as needed for Dizziness 60 tablet 1    cyanocobalamin ([employer] VITAMIN B12) 1000 MCG tablet Take 1 tablet by mouth daily 90 tablet 1    metoprolol succinate (TOPROL XL) 25 MG extended release tablet TAKE 1/2 ( ONE-HALF) TABLETS BY MOUTH ONCE DAILY IN THE MORNING AND THEN TAKE 1 & 1/2 (ONE & ONE-HALF) TABS ONCE DAILY AT BEDTIME 60 tablet 9    pantoprazole (PROTONIX) 40 MG tablet Take 1 tablet by mouth once daily 90 tablet 3    clopidogrel (PLAVIX) 75 MG tablet Take 1 tablet by mouth once daily 90 tablet 3    atorvastatin (LIPITOR) 10 MG tablet Take 1 tablet by mouth once daily 90 tablet 3    amLODIPine (NORVASC) 5 MG tablet Take 1 tablet by mouth once daily 90 tablet 3    valsartan (DIOVAN) 320 MG tablet Take 1 tablet by mouth once daily 90 tablet 3    SENNA CO by Combination route      mirtazapine (REMERON) 15 MG tablet Take 0.5 tablets by mouth nightly 45 tablet 3    carbamide peroxide (DEBROX) 6.5 % otic solution 5 drops as needed      lidocaine-prilocaine (EMLA) 2.5-2.5 % cream Apply topically as needed for Pain Apply topically as needed.  acetaminophen (TYLENOL) 500 MG tablet Take 500 mg by mouth every 6 hours as needed for Pain      timolol (TIMOPTIC) 0.5 % ophthalmic solution 1 drop 2 times daily      famotidine (PEPCID) 40 MG tablet Take 40 mg by mouth daily      sennosides-docusate sodium (SENOKOT-S) 8.6-50 MG tablet Take 1 tablet by mouth daily      Cholecalciferol (VITAMIN D3) 125 MCG (5000 UT) TABS Take 1 tablet by mouth daily      ondansetron (ZOFRAN) 4 MG tablet Take 4 mg by mouth every 8 hours as needed for Nausea or Vomiting      nitroGLYCERIN (NITROSTAT) 0.4 MG SL tablet Place 1 tablet under the tongue every 5 minutes as needed for Chest pain 25 tablet 3    Calcium Citrate-Vitamin D (CALCIUM CITRATE + PO) Take 600 mg by mouth 2 times daily      aspirin 81 MG chewable tablet Take 1 tablet by mouth daily. 30 tablet 3    Multiple Vitamins-Minerals (OCUVITE) TABS oral tablet Take 1 tablet by mouth daily.  Flaxseed, Linseed, (FLAX SEED OIL) 1000 MG CAPS Take 1 capsule by mouth 2 times daily.         gabapentin (NEURONTIN) 400 MG capsule TAKE 1 CAPSULE BY MOUTH THREE TIMES DAILY 90 capsule 5     No current facility-administered medications for this visit. No orders of the defined types were placed in this encounter. All medications reviewed and reconciled, including OTC and herbal medications. Updated list given to patient. Patient Active Problem List    Diagnosis Date Noted    ASHD (arteriosclerotic heart disease)     Constipation     DDD (degenerative disc disease), lumbar     Dyslipidemia     Glaucoma     History of vertebral compression fracture; L1     Hypertension, essential     S/P PTCA: 2/2/2015: Stenting mid-LAD using Xience Alpine 2.75 X 28 mm, post-dilated to 3.0 mm distally and 3.18 mm proximally. 02/02/2015 2/2/2015: Stenting mid-LAD using Xience Alpine 2.75 X 28 mm, post-dilated to 3.0 mm distally and 3.18 mm proximally. Dr. Lizy Reis Tinnitus of both ears 11/19/2013    Status post tympanoplasty 06/24/2013    VMR (vasomotor rhinitis) 10/09/2012       Past Medical History:   Diagnosis Date    ASHD (arteriosclerotic heart disease)     Constipation     DDD (degenerative disc disease), lumbar     Dyslipidemia     Fibromyalgia     GERD (gastroesophageal reflux disease)     Glaucoma     History of vertebral compression fracture; L1     Hypertension, essential     Nausea & vomiting     Osteoporosis     S/P PTCA: 2/2/2015: Stenting mid-LAD using Xience Alpine 2.75 X 28 mm, post-dilated to 3.0 mm distally and 3.18 mm proximally. 02/02/2015 2/2/2015: Stenting mid-LAD using Xience Alpine 2.75 X 28 mm, post-dilated to 3.0 mm distally and 3.18 mm proximally.  Dr. Evgeny Puga Status post tympanoplasty 6/24/2013    Tinnitus of both ears 11/19/2013       Past Surgical History:   Procedure Laterality Date    APPENDECTOMY  1940    CARDIAC CATHETERIZATION  2015    stent x1    COLON SURGERY  1994    COLONOSCOPY  07/19/2005    EYE SURGERY  2009    left corneal transplant    EYE SURGERY  2008    right corneal transplant    EYE SURGERY 128/64   Pulse: 75   Resp: 12   Temp: 97.9 °F (36.6 °C)   TempSrc: Oral   SpO2: 97%   Weight: 127 lb (57.6 kg)   Height: 4' 9\" (1.448 m)     Body mass index is 27.48 kg/m². Pain Score:   0 - No pain    VS Reviewed  General Appearance: A&O to person only, No acute distress,well developed and well- nourished  Eyes: pupils equal, round, and reactive to light, extraocular eye movements intact, conjunctivae and eye lids without erythema  ENT: external ear and ear canal clear bilaterally, TMs intact and regular, nose without deformity, nasal mucosa and turbinates normal without polyps, oropharynx normal, dentition is normal for age  Neck: supple and non-tender without mass, no thyromegaly or thyroid nodules, no cervical lymphadenopathy  Pulmonary/Chest: clear to auscultation bilaterally- no wheezes, rales or rhonchi, normal air movement, no respiratory distress or retractions  Cardiovascular: S1 and S2 auscultated w/ RRR. No murmurs, rubs, clicks, or gallops, distal pulses intact. Abdomen: soft, non-tender, non-distended, bowel sounds physiologic,  no rebound or guarding, no masses or hernias noted. Liver and spleen without enlargement. Extremities: no cyanosis, clubbing or edema of the lower extremities. +2 PT/DP bilaterally. Musculoskeletal: No joint swelling or gross deformity   Psych: Affect appropriate. Mood normal. Thought process is normal without evidence of depression or psychosis. Good insight and appropriate interaction. Cognition and memory appear to be impaored. Skin: warm and dry, no rash or erythema other than scattered erythematous rash on bilat hands/dry  HIP EXAM:  Examination of the left hip shows: There is not deformity. There is not erythema. There is none pain with internal and external rotation. There is none pain with flexion and extension. There isnone pain with active SLR. ROM full range of motion. Leg lengths: Equal  Trochanteric region is  tender to palpation.   Sacral Iliac is not tender to palpation. There is none pain with weight bearing. ASSESSMENT & PLAN  1. Greater trochanteric bursitis, left    C/w Great troch bursitis  Reassuring exam  con't scheduled and prn tylenol  Add PT  No need for xray  F/u if no better    - External Referral To Physical Therapy    2. Hand dermatitis    Petrolatum bid and after hand washing  F/u 4 wks if no better    3. ASHD (arteriosclerotic heart disease)    Stable  con't current meds, DAPT, toprol, Valsartan and statin  Labs UTD  F/u cardio as well    4. Hypertension, essential    At goal  con't meds  Labs UTD    5. Dyslipidemia    Stable  con't lipitor     6. Gastroesophageal reflux disease, unspecified whether esophagitis present    Stable  con't pantoprazole    7. Fibromyalgia    Stable  con't fibro    8. Glaucoma, unspecified glaucoma type, unspecified laterality    con't timolol drops. 9. Vertigo    Improved from last visit  Will monitor    10. Fatigue, unspecified type    Improved from last visit  Will monitor    11. Anemia due to vitamin B12 deficiency, unspecified B12 deficiency type    Improving  con't b12  Labs in 4 wks. Has order      DISPOSITION    Return in about 6 months (around 8/7/2022) for AWV, follow-up on chronic medical conditions, sooner as needed. Oletha Mast released without restrictions. Future Appointments   Date Time Provider Bj Glynni   5/19/2022 11:15 AM Claudia Neil MD [facility] [place]   8/8/2022  2:00 PM Tae Vicente DO [place] [id number] [facility]     PATIENT COUNSELING    Barriers to learning and self management: [place], daughter present. Discussed use, benefit, and side effects of prescribed medications. Barriers to medication compliance addressed. All patient questions answered. Pt voiced understanding.        Electronically signed by Tae Vicente DO on 2/7/2022 at 2:43 PM

## 2022-02-07 ENCOUNTER — OFFICE VISIT (OUTPATIENT)
Dept: FAMILY MEDICINE CLINIC | Age: 87
End: 2022-02-07
Payer: MEDICARE

## 2022-02-07 VITALS
HEART RATE: 75 BPM | TEMPERATURE: 97.9 F | OXYGEN SATURATION: 97 % | DIASTOLIC BLOOD PRESSURE: 64 MMHG | WEIGHT: 127 LBS | RESPIRATION RATE: 12 BRPM | SYSTOLIC BLOOD PRESSURE: 128 MMHG | BODY MASS INDEX: 27.4 KG/M2 | HEIGHT: 57 IN

## 2022-02-07 DIAGNOSIS — I25.10 ASHD (ARTERIOSCLEROTIC HEART DISEASE): ICD-10-CM

## 2022-02-07 DIAGNOSIS — M70.62 GREATER TROCHANTERIC BURSITIS, LEFT: Primary | ICD-10-CM

## 2022-02-07 DIAGNOSIS — M79.7 FIBROMYALGIA: ICD-10-CM

## 2022-02-07 DIAGNOSIS — D51.9 ANEMIA DUE TO VITAMIN B12 DEFICIENCY, UNSPECIFIED B12 DEFICIENCY TYPE: ICD-10-CM

## 2022-02-07 DIAGNOSIS — I10 HYPERTENSION, ESSENTIAL: ICD-10-CM

## 2022-02-07 DIAGNOSIS — L30.9 HAND DERMATITIS: ICD-10-CM

## 2022-02-07 DIAGNOSIS — E78.5 DYSLIPIDEMIA: ICD-10-CM

## 2022-02-07 DIAGNOSIS — K21.9 GASTROESOPHAGEAL REFLUX DISEASE, UNSPECIFIED WHETHER ESOPHAGITIS PRESENT: ICD-10-CM

## 2022-02-07 DIAGNOSIS — R42 VERTIGO: ICD-10-CM

## 2022-02-07 DIAGNOSIS — H40.9 GLAUCOMA, UNSPECIFIED GLAUCOMA TYPE, UNSPECIFIED LATERALITY: ICD-10-CM

## 2022-02-07 DIAGNOSIS — R53.83 FATIGUE, UNSPECIFIED TYPE: ICD-10-CM

## 2022-02-07 PROCEDURE — 99214 OFFICE O/P EST MOD 30 MIN: CPT | Performed by: FAMILY MEDICINE

## 2022-02-07 NOTE — PATIENT INSTRUCTIONS
LAB INSTRUCTIONS:    Please complete labs mid [facility] 2022. Please fast for 8 hours prior to lab collection. The clinic will call you within 1 week of collection. If you have not heard from us within that amount of time, please call us at [phone].

## 2022-03-01 ENCOUNTER — TELEPHONE (OUTPATIENT)
Dept: FAMILY MEDICINE CLINIC | Age: 87
End: 2022-03-01

## 2022-03-01 NOTE — TELEPHONE ENCOUNTER
The forms were up front in the brown binder in the \"D\" section but apparently no one informed the patient's daughter, David Jurado, that they were ready to be picked up after the forms were signed by Dr Margarite Boeck and ready to be picked up last week. David Jurado has been informed and voiced understanding that the forms were completed and ready to be picked up and will  the forms when she comes in for her father's appointment this afternoon.   The forms have been placed back in the brown binder at the  under \"W\"

## 2022-03-01 NOTE — TELEPHONE ENCOUNTER
Spoke to pt's daughter . She wanted to know if Dr Shorty José had got the [facility] form for her mother and had a chance to fill it out . Daughter is coming to  Pt's  appt today Florentino Tucker )   and will pick it then if  Completed .

## 2022-03-23 ENCOUNTER — TELEPHONE (OUTPATIENT)
Dept: FAMILY MEDICINE CLINIC | Age: 87
End: 2022-03-23

## 2022-03-23 NOTE — TELEPHONE ENCOUNTER
----- Message from Michel Macdonald, DO sent at 3/23/2022  6:37 AM EDT -----  Please let pt/family know that b12 and cbc look better  Con't oral b12  Let me know if questions, thanks!

## 2022-04-09 DIAGNOSIS — F32.A DEPRESSION, UNSPECIFIED DEPRESSION TYPE: ICD-10-CM

## 2022-04-11 RX ORDER — MIRTAZAPINE 15 MG/1
TABLET, FILM COATED ORAL
Qty: 45 TABLET | Refills: 3 | Status: SHIPPED | OUTPATIENT
Start: 2022-04-11

## 2022-05-19 ENCOUNTER — OFFICE VISIT (OUTPATIENT)
Dept: CARDIOLOGY CLINIC | Age: 87
End: 2022-05-19
Payer: MEDICARE

## 2022-05-19 VITALS
HEIGHT: 57 IN | SYSTOLIC BLOOD PRESSURE: 124 MMHG | WEIGHT: 128 LBS | HEART RATE: 69 BPM | BODY MASS INDEX: 27.61 KG/M2 | DIASTOLIC BLOOD PRESSURE: 72 MMHG

## 2022-05-19 DIAGNOSIS — I25.10 CORONARY ARTERY DISEASE INVOLVING NATIVE CORONARY ARTERY OF NATIVE HEART WITHOUT ANGINA PECTORIS: ICD-10-CM

## 2022-05-19 DIAGNOSIS — R06.02 SOB (SHORTNESS OF BREATH): Primary | ICD-10-CM

## 2022-05-19 DIAGNOSIS — R00.2 PALPITATIONS: ICD-10-CM

## 2022-05-19 PROCEDURE — 1036F TOBACCO NON-USER: CPT | Performed by: NUCLEAR MEDICINE

## 2022-05-19 PROCEDURE — 4040F PNEUMOC VAC/ADMIN/RCVD: CPT | Performed by: NUCLEAR MEDICINE

## 2022-05-19 PROCEDURE — G8427 DOCREV CUR MEDS BY ELIG CLIN: HCPCS | Performed by: NUCLEAR MEDICINE

## 2022-05-19 PROCEDURE — 1123F ACP DISCUSS/DSCN MKR DOCD: CPT | Performed by: NUCLEAR MEDICINE

## 2022-05-19 PROCEDURE — 99213 OFFICE O/P EST LOW 20 MIN: CPT | Performed by: NUCLEAR MEDICINE

## 2022-05-19 PROCEDURE — G8417 CALC BMI ABV UP PARAM F/U: HCPCS | Performed by: NUCLEAR MEDICINE

## 2022-05-19 PROCEDURE — 1090F PRES/ABSN URINE INCON ASSESS: CPT | Performed by: NUCLEAR MEDICINE

## 2022-05-19 PROCEDURE — 93000 ELECTROCARDIOGRAM COMPLETE: CPT | Performed by: NUCLEAR MEDICINE

## 2022-05-19 NOTE — PROGRESS NOTES
[id number] [facility] .  [address] [place] [id number]  Dept: [phone]  Dept Fax: [phone]  Loc: [phone]    Visit Date: 5/19/2022    Piyush Ritter is a 80 y.o. female who presents todayfor:  Chief Complaint   Patient presents with    Hypertension    Hyperlipidemia     Severe dementia   Some fatigue   No dizziness  No syncope  BP is stable   No chest pain  Some dyspnea  Age issues     HPI:  HPI  Past Medical History:   Diagnosis Date    ASHD (arteriosclerotic heart disease)     Constipation     DDD (degenerative disc disease), lumbar     Dyslipidemia     Fibromyalgia     GERD (gastroesophageal reflux disease)     Glaucoma     History of vertebral compression fracture; L1     Hypertension, essential     Nausea & vomiting     Osteoporosis     S/P PTCA: 2/2/2015: Stenting mid-LAD using Xience Alpine 2.75 X 28 mm, post-dilated to 3.0 mm distally and 3.18 mm proximally. 02/02/2015 2/2/2015: Stenting mid-LAD using Xience Alpine 2.75 X 28 mm, post-dilated to 3.0 mm distally and 3.18 mm proximally.  Dr. [facility] Status post tympanoplasty 6/24/2013    Tinnitus of both ears 11/19/2013      Past Surgical History:   Procedure Laterality Date    APPENDECTOMY  1940    CARDIAC CATHETERIZATION  2015    stent x1    COLON SURGERY  1994    COLONOSCOPY  07/19/2005    EYE SURGERY  2009    left corneal transplant    EYE SURGERY  2008    right corneal transplant    EYE SURGERY      bilateral cataract    [facility]. PAIN BLOCK Left 12/14/2018    Synvisc ONE LEFT knee performed by Carlota Sharma MD at [facility]    hysterectomy    KYPHOSIS SURGERY  2017    L-1 compression fracture    MIDDLE EAR SURGERY  2005    right tympnaoplasty by dr Carmela Quick Right 05/03/2013     Family History   Problem Relation Age of Onset    Arthritis Mother     Heart Disease Father     Heart Disease Sister    Tony Blackburn Heart Disease Brother     Heart Disease Sister     Heart Disease Sister      Social History     Tobacco Use    Smoking status: Never Smoker    Smokeless tobacco: Never Used   Substance Use Topics    Alcohol use: No      Current Outpatient Medications   Medication Sig Dispense Refill    mirtazapine (REMERON) 15 MG tablet TAKE 1/2 (ONE-HALF) TABLET BY MOUTH NIGHTLY 45 tablet 3    meclizine (ANTIVERT) 12.5 MG tablet Take 1 tablet by mouth 3 times daily as needed for Dizziness 60 tablet 1    cyanocobalamin ([employer]S VITAMIN B12) 1000 MCG tablet Take 1 tablet by mouth daily 90 tablet 1    metoprolol succinate (TOPROL XL) 25 MG extended release tablet TAKE  1/2 ( ONE-HALF) TABLETS BY MOUTH ONCE DAILY IN THE MORNING AND THEN TAKE 1 & 1/2 (ONE & ONE-HALF) TABS ONCE DAILY AT BEDTIME 60 tablet 9    pantoprazole (PROTONIX) 40 MG tablet Take 1 tablet by mouth once daily 90 tablet 3    clopidogrel (PLAVIX) 75 MG tablet Take 1 tablet by mouth once daily 90 tablet 3    atorvastatin (LIPITOR) 10 MG tablet Take 1 tablet by mouth once daily 90 tablet 3    amLODIPine (NORVASC) 5 MG tablet Take 1 tablet by mouth once daily 90 tablet 3    valsartan (DIOVAN) 320 MG tablet Take 1 tablet by mouth once daily 90 tablet 3    SENNA CO by Combination route      carbamide peroxide (DEBROX) 6.5 % otic solution 5 drops as needed      lidocaine-prilocaine (EMLA) 2.5-2.5 % cream Apply topically as needed for Pain Apply topically as needed.       acetaminophen (TYLENOL) 500 MG tablet Take 500 mg by mouth every 6 hours as needed for Pain      timolol (TIMOPTIC) 0.5 % ophthalmic solution 1 drop 2 times daily      famotidine (PEPCID) 40 MG tablet Take 40 mg by mouth daily      sennosides-docusate sodium (SENOKOT-S) 8.6-50 MG tablet Take 1 tablet by mouth daily      Cholecalciferol (VITAMIN D3) 125 MCG (5000 UT) TABS Take 1 tablet by mouth daily      ondansetron (ZOFRAN) 4 MG tablet Take 4 mg by mouth every 8 hours as needed for Nausea or Vomiting      nitroGLYCERIN (NITROSTAT) 0.4 MG SL tablet Place 1 tablet under the tongue every 5 minutes as needed for Chest pain 25 tablet 3    Calcium Citrate-Vitamin D (CALCIUM CITRATE + PO) Take 600 mg by mouth 2 times daily      aspirin 81 MG chewable tablet Take 1 tablet by mouth daily. 30 tablet 3    Multiple Vitamins-Minerals (OCUVITE) TABS oral tablet Take 1 tablet by mouth daily.  Flaxseed, Linseed, (FLAX SEED OIL) 1000 MG CAPS Take 1 capsule by mouth 2 times daily.  gabapentin (NEURONTIN) 400 MG capsule TAKE 1 CAPSULE BY MOUTH THREE TIMES DAILY 90 capsule 5     No current facility-administered medications for this visit. Allergies   Allergen Reactions    Codeine     Diazepam     Lisinopril     Simvastatin      Muscle aches    Valtrex [Valacyclovir Hcl]     Sulfa Antibiotics Rash     Health Maintenance   Topic Date Due    DTaP/Tdap/Td vaccine (1 - Tdap) Never done    Shingles vaccine (1 of 2) Never done    Lipids  04/06/2022    Depression Screen  08/03/2022    Annual Wellness Visit (AWV)  08/04/2022    Flu vaccine  Completed    Pneumococcal 65+ years Vaccine  Completed    COVID-19 Vaccine  Completed    Hepatitis A vaccine  Aged Out    Hepatitis B vaccine  Aged Out    Hib vaccine  Aged Out    Meningococcal (ACWY) vaccine  Aged Out       Subjective:  Review of Systems  General:   No fever, no chills, some fatigue or weight loss  Pulmonary:    some dyspnea, no wheezing  Cardiac:    Denies recent chest pain,   GI:     No nausea or vomiting, no abdominal pain  Neuro:    No dizziness or light headedness,   Musculoskeletal:  No recent active issues  Extremities:   No edema, no obvious claudication       Objective:  Physical Exam  /72   Pulse 69   Ht 4' 9\" (1.448 m)   Wt 128 lb (58.1 kg)   BMI 27.70 kg/m²   General:   Well developed, well nourished  Lungs:   Clear to auscultation  Heart:    Normal S1 S2, Slight murmur.  no rubs, no gallops  Abdomen:   Soft, non tender, no organomegalies, positive bowel sounds  Extremities:   No edema, no cyanosis, good peripheral pulses  Neurological:   Awake, alert, oriented. No obvious focal deficits  Musculoskelatal:  No obvious deformities    Assessment:      Diagnosis Orders   1. SOB (shortness of breath)  EKG 12 lead   2. Palpitations  EKG 12 lead   3. Coronary artery disease involving native coronary artery of native heart without angina pectoris  EKG 12 lead   as above  Cardiac fair for now   ECG in office was done today. I reviewed the ECG. No acute findings    Plan:  No follow-ups on file. As above  Continue risk factor modification and medical management. Thank you for allowing me to participate in the care of your patient. Please don't hesitate to contact me regarding any further issues related to the patient care      Orders Placed:  Orders Placed This Encounter   Procedures    EKG 12 lead     Order Specific Question:   Reason for Exam?     Answer: Other       Medications Prescribed:  No orders of the defined types were placed in this encounter. Discussed use, benefit, and side effects of prescribed medications. All patient questions answered. Pt voicedunderstanding. Instructed to continue current medications, diet and exercise. Continue risk factor modification and medical management. Patient agreed with treatment plan. Follow up as directed.     Electronically signedby Maynard Brunner, MD on 5/19/2022 at 11:19 AM

## 2022-06-07 DIAGNOSIS — I10 HYPERTENSION, ESSENTIAL: ICD-10-CM

## 2022-06-07 RX ORDER — AMLODIPINE BESYLATE 5 MG/1
TABLET ORAL
Qty: 90 TABLET | Refills: 3 | Status: SHIPPED | OUTPATIENT
Start: 2022-06-07

## 2022-06-27 RX ORDER — ATORVASTATIN CALCIUM 10 MG/1
TABLET, FILM COATED ORAL
Qty: 90 TABLET | Refills: 0 | OUTPATIENT
Start: 2022-06-27

## 2022-07-05 RX ORDER — PANTOPRAZOLE SODIUM 40 MG/1
TABLET, DELAYED RELEASE ORAL
Qty: 90 TABLET | Refills: 0 | OUTPATIENT
Start: 2022-07-05

## 2022-07-05 RX ORDER — VALSARTAN 320 MG/1
TABLET ORAL
Qty: 90 TABLET | Refills: 0 | OUTPATIENT
Start: 2022-07-05

## 2022-07-08 ENCOUNTER — TELEPHONE (OUTPATIENT)
Dept: FAMILY MEDICINE CLINIC | Age: 87
End: 2022-07-08

## 2022-07-08 DIAGNOSIS — I25.10 ASHD (ARTERIOSCLEROTIC HEART DISEASE): Primary | Chronic | ICD-10-CM

## 2022-07-08 DIAGNOSIS — I10 HYPERTENSION, ESSENTIAL: Chronic | ICD-10-CM

## 2022-07-08 DIAGNOSIS — D51.9 ANEMIA DUE TO VITAMIN B12 DEFICIENCY, UNSPECIFIED B12 DEFICIENCY TYPE: ICD-10-CM

## 2022-07-08 NOTE — TELEPHONE ENCOUNTER
Condition:: Microdermabrasion Patients spouse has been informed and voiced understanding   Labs mailed Please Describe Your Condition:: Congested oily pores

## 2022-07-08 NOTE — TELEPHONE ENCOUNTER
Pt due for fasting labs prior to next apt on 8/8/2022. Please call to have pt complete this. Thanks! ASSESSMENT & PLAN   Diagnosis Orders   1. ASHD (arteriosclerotic heart disease)  CBC with Auto Differential    Comprehensive Metabolic Panel    Lipid Panel    TSH with Reflex    Vitamin B12   2. Hypertension, essential  CBC with Auto Differential    Comprehensive Metabolic Panel    Lipid Panel    TSH with Reflex    Vitamin B12   3.  Anemia due to vitamin B12 deficiency, unspecified B12 deficiency type  CBC with Auto Differential    Comprehensive Metabolic Panel    Lipid Panel    TSH with Reflex    Vitamin B12     Future Appointments   Date Time Provider Bj Ahuja   8/8/2022  2:00 PM Mohit Betancourt  [facility]

## 2022-07-11 RX ORDER — PANTOPRAZOLE SODIUM 40 MG/1
TABLET, DELAYED RELEASE ORAL
Qty: 90 TABLET | Refills: 3 | Status: SHIPPED | OUTPATIENT
Start: 2022-07-11

## 2022-07-11 RX ORDER — VALSARTAN 320 MG/1
TABLET ORAL
Qty: 90 TABLET | Refills: 3 | Status: SHIPPED | OUTPATIENT
Start: 2022-07-11

## 2022-07-13 RX ORDER — ATORVASTATIN CALCIUM 10 MG/1
TABLET, FILM COATED ORAL
Qty: 90 TABLET | Refills: 0 | OUTPATIENT
Start: 2022-07-13

## 2022-07-21 RX ORDER — CLOPIDOGREL BISULFATE 75 MG/1
TABLET ORAL
Qty: 90 TABLET | Refills: 0 | Status: SHIPPED | OUTPATIENT
Start: 2022-07-21 | End: 2022-10-31 | Stop reason: SDUPTHER

## 2022-07-21 RX ORDER — ATORVASTATIN CALCIUM 10 MG/1
TABLET, FILM COATED ORAL
Qty: 90 TABLET | Refills: 0 | Status: SHIPPED | OUTPATIENT
Start: 2022-07-21 | End: 2022-10-31 | Stop reason: SDUPTHER

## 2022-08-01 RX ORDER — METOPROLOL SUCCINATE 25 MG/1
TABLET, EXTENDED RELEASE ORAL
Qty: 60 TABLET | Refills: 0 | OUTPATIENT
Start: 2022-08-01

## 2022-08-02 DIAGNOSIS — G62.9 NEUROPATHY: ICD-10-CM

## 2022-08-02 DIAGNOSIS — I25.10 ASHD (ARTERIOSCLEROTIC HEART DISEASE): Primary | ICD-10-CM

## 2022-08-02 RX ORDER — GABAPENTIN 400 MG/1
CAPSULE ORAL
Qty: 90 CAPSULE | Refills: 11 | Status: SHIPPED | OUTPATIENT
Start: 2022-08-02 | End: 2023-01-29

## 2022-08-02 RX ORDER — METOPROLOL SUCCINATE 25 MG/1
TABLET, EXTENDED RELEASE ORAL
Qty: 60 TABLET | Refills: 0 | OUTPATIENT
Start: 2022-08-02

## 2022-08-02 RX ORDER — METOPROLOL SUCCINATE 25 MG/1
TABLET, EXTENDED RELEASE ORAL
Qty: 180 TABLET | Refills: 3 | Status: SHIPPED | OUTPATIENT
Start: 2022-08-02

## 2022-08-04 DIAGNOSIS — E87.1 HYPONATREMIA: ICD-10-CM

## 2022-08-04 DIAGNOSIS — D64.9 NORMOCYTIC ANEMIA: Primary | ICD-10-CM

## 2022-08-05 ENCOUNTER — TELEPHONE (OUTPATIENT)
Dept: FAMILY MEDICINE CLINIC | Age: 87
End: 2022-08-05

## 2022-08-05 NOTE — PROGRESS NOTES
Chief Complaint   Patient presents with    Medicare AWV    Anemia     Worse on recent labs    Other     Mild hyponatremia noted on recent labs    Follow-up     Chronic issues as noted below     History obtained from daughter and the patient. SUBJECTIVE:  Daniel Dao is a 80 y.o. female that presents today for     -Anemia/B12 def  Noted on labs  Declined aggressive w/u previously  Is DNR/CC  On b12 now, and labs had been improving  However, had labs done last wk that showed hb down to 8.8, was 11.6 in [facility] 2022  Denies bleeding, melena or hematochezia  Does have inc fatigue the last 4 wks  No CP/SOB      -Hyponatremia:   Noted on labs, mild, 131  Never low before  No new meds  Appetite good  Wts stable      -Fatigue: Worse the last month  Worsen anemia as above      -CAD:    HPI:  Follows with cardio  No CP/sOB  Due for labs  On DAPT, BB, ARB and statin    History of myocardial infarction? Yes    History of heart failure? No  Current symptoms of angina? No   Patient compliant with therapy? Yes  Tobacco use? No      Antiplatelet therapy? Yes    Beta-blocker therapy?   Yes   ACE/ARB therapy - Yes      -HTN:    HPI:    Taking meds as prescribed ?: yes  Tolerating well ?: yes  Side Effects ?: denies  BP at home ?: typically <140/90  Working on TLCS ?: yes  Chest Pain/SOB/Palpitations? denies    BP Readings from Last 3 Encounters:   08/08/22 120/72   05/19/22 124/72   02/07/22 128/64       -HLD:    HPI:    Taking meds as prescribed ?: yes  Tolerating well ?: yes  Side Effects ?: denies  Muscle Pain?: denies  Working on TLCS ?: yes      -GERD:    HPI:  Gets on and off nausea with it, but controlled with meds    Taking meds as prescribed ?: yes  Tolerating well ?: yes  Side Effects ?: deneis  Dysphagia ?: deneis  Odynophagia ?: denies  Melena ?: denies  Working on TLCS ?: yes      -Fibro:  On ro  Works well enough  Pain controlled      -Glaucoma:  Mostly blind both eyes  On timolol  Seen optho, Piedad Burkitt, in the past  But is just f/u prn now.       -Dizziness LAST VISIT  Worse a few months ago  Doing better now  Saw cardio, felt not related to cardio  Saw audio, did not do w/u d/t pt not being able to get done  They recommended vestib rehab, however, pt doing better  Having 1 episode every 2 wks or so  Doesn't last long  ? Vertigo    UPDATE TODAY:   Doing better  No recurrence yet.         Age/Gender Health Maintenance    Lipid - ; LDL 66; HDL 45; TG 95 (AUG 2022)    Lab Results   Component Value Date    CHOL 160 04/06/2021    CHOL 154 04/01/2017    CHOL 180 08/31/2015     Lab Results   Component Value Date    TRIG 102 04/06/2021    TRIG 105 04/01/2017    TRIG 91 08/31/2015     Lab Results   Component Value Date    HDL 65 04/06/2021    HDL 67 04/01/2017    HDL 80 08/31/2015     Lab Results   Component Value Date    LDLCALC 75 04/06/2021    LDLCALC 66 04/01/2017    LDLCALC 82 08/31/2015     Lab Results   Component Value Date    VLDL 20 04/06/2021     No results found for: CHOLHDLRATIO      DM Screen - 75 (APR 2021)    Lab Results   Component Value Date/Time    GLUCOSE 107 05/20/2020 12:14 PM    GLUCOSE 77 11/14/2018 03:03 PM       Colon Cancer Screening - aged out  Lung Cancer Screening (Age 54 to [de-identified] with 30 pack year hx, current smoker or quit within past 15 years) - aged out    Tetanus - to get at pharmacy per medicare rules  Influenza Vaccine - UTD FALL 2021  Pneumonia Vaccine - UTD  Zoster - to get at pharmacy per medicare rules     Breast Cancer Screening - aged out  Cervical Cancer Screening - aged out  Osteoporosis Screening - declines JAN 2021      Current Outpatient Medications   Medication Sig Dispense Refill    metoprolol succinate (TOPROL XL) 25 MG extended release tablet TAKE  1/2 ( ONE-HALF) TABLETS BY MOUTH ONCE DAILY IN THE MORNING AND THEN TAKE 1 & 1/2 (ONE & ONE-HALF) TABS ONCE DAILY AT BEDTIME 180 tablet 3    gabapentin (NEURONTIN) 400 MG capsule TAKE 1 CAPSULE BY MOUTH THREE TIMES DAILY 90 capsule 11    atorvastatin (LIPITOR) 10 MG tablet Take 1 tablet by mouth once daily 90 tablet 0    clopidogrel (PLAVIX) 75 MG tablet Take 1 tablet by mouth once daily 90 tablet 0    pantoprazole (PROTONIX) 40 MG tablet Take 1 tab daily 90 tablet 3    valsartan (DIOVAN) 320 MG tablet Take 1 tablet by mouth once daily 90 tablet 3    amLODIPine (NORVASC) 5 MG tablet Take 1 tablet by mouth once daily 90 tablet 3    mirtazapine (REMERON) 15 MG tablet TAKE 1/2 (ONE-HALF) TABLET BY MOUTH NIGHTLY 45 tablet 3    meclizine (ANTIVERT) 12.5 MG tablet Take 1 tablet by mouth 3 times daily as needed for Dizziness 60 tablet 1    cyanocobalamin ([employer] VITAMIN B12) 1000 MCG tablet Take 1 tablet by mouth daily 90 tablet 1    SENNA CO by Combination route      carbamide peroxide (DEBROX) 6.5 % otic solution 5 drops as needed      lidocaine-prilocaine (EMLA) 2.5-2.5 % cream Apply topically as needed for Pain Apply topically as needed. acetaminophen (TYLENOL) 500 MG tablet Take 500 mg by mouth every 6 hours as needed for Pain      timolol (TIMOPTIC) 0.5 % ophthalmic solution 1 drop 2 times daily      famotidine (PEPCID) 40 MG tablet Take 40 mg by mouth daily      sennosides-docusate sodium (SENOKOT-S) 8.6-50 MG tablet Take 1 tablet by mouth daily      Cholecalciferol (VITAMIN D3) 125 MCG (5000 UT) TABS Take 1 tablet by mouth daily      ondansetron (ZOFRAN) 4 MG tablet Take 4 mg by mouth every 8 hours as needed for Nausea or Vomiting      nitroGLYCERIN (NITROSTAT) 0.4 MG SL tablet Place 1 tablet under the tongue every 5 minutes as needed for Chest pain 25 tablet 3    Calcium Citrate-Vitamin D (CALCIUM CITRATE + PO) Take 600 mg by mouth 2 times daily      aspirin 81 MG chewable tablet Take 1 tablet by mouth daily. 30 tablet 3    Multiple Vitamins-Minerals (OCUVITE) TABS oral tablet Take 1 tablet by mouth daily. Flaxseed, Linseed, (FLAX SEED OIL) 1000 MG CAPS Take 1 capsule by mouth 2 times daily.          No current facility-administered medications for this visit. No orders of the defined types were placed in this encounter. All medications reviewed and reconciled, including OTC and herbal medications. Updated list given to patient. Patient Active Problem List    Diagnosis Date Noted    ASHD (arteriosclerotic heart disease)     Constipation     DDD (degenerative disc disease), lumbar     Dyslipidemia     Glaucoma     History of vertebral compression fracture; L1     Hypertension, essential     S/P PTCA: 2/2/2015: Stenting mid-LAD using Xience Alpine 2.75 X 28 mm, post-dilated to 3.0 mm distally and 3.18 mm proximally. 02/02/2015 2/2/2015: Stenting mid-LAD using Xience Alpine 2.75 X 28 mm, post-dilated to 3.0 mm distally and 3.18 mm proximally. Dr. Jasper Mcclellan      Tinnitus of both ears 11/19/2013    Status post tympanoplasty 06/24/2013    VMR (vasomotor rhinitis) 10/09/2012       Past Medical History:   Diagnosis Date    ASHD (arteriosclerotic heart disease)     Constipation     DDD (degenerative disc disease), lumbar     Dyslipidemia     Fibromyalgia     GERD (gastroesophageal reflux disease)     Glaucoma     History of vertebral compression fracture; L1     Hypertension, essential     Nausea & vomiting     Osteoporosis     S/P PTCA: 2/2/2015: Stenting mid-LAD using Xience Alpine 2.75 X 28 mm, post-dilated to 3.0 mm distally and 3.18 mm proximally. 02/02/2015 2/2/2015: Stenting mid-LAD using Xience Alpine 2.75 X 28 mm, post-dilated to 3.0 mm distally and 3.18 mm proximally.  Dr. Jasper Mcclellan    Status post tympanoplasty 6/24/2013    Tinnitus of both ears 11/19/2013       Past Surgical History:   Procedure Laterality Date    APPENDECTOMY  1940    CARDIAC CATHETERIZATION  2015    stent x1    COLON SURGERY  1994    COLONOSCOPY  07/19/2005    EYE SURGERY  2009    left corneal transplant    EYE SURGERY  2008    right corneal transplant    EYE SURGERY      bilateral cataract    [facility]. PAIN BLOCK Left 12/14/2018    Synvisc ONE LEFT knee performed by Ambrose Ryan MD at [address] [facility] ([facility])  1969    hysterectomy    KYPHOSIS SURGERY  2017    L-1 compression fracture    MIDDLE EAR SURGERY  2005    right tympnaoplasty by dr gipson    TYMPANOPLASTY Right 05/03/2013       Allergies   Allergen Reactions    Codeine     Diazepam     Lisinopril     Simvastatin      Muscle aches    Valtrex [Valacyclovir Hcl]     Sulfa Antibiotics Rash       Social History     Tobacco Use    Smoking status: Never    Smokeless tobacco: Never   Substance Use Topics    Alcohol use: No       Family History   Problem Relation Age of Onset    Arthritis Mother     Heart Disease Father     Heart Disease Sister     Heart Disease Brother     Heart Disease Sister     Heart Disease Sister        I have reviewed the patient's past medical history, past surgical history, allergies, medications, social and family history and I have made updates where appropriate.       Review of Systems  Positive responses are highlighted in bold    Constitutional:  Fever, Chills, Night Sweats, Fatigue, Unexpected changes in weight  HENT:  Ear pain, Tinnitus, Nosebleeds, Trouble swallowing, Hearing loss, Sore throat  Cardiovascular:  Chest Pain, Palpitations, Orthopnea, Paroxysmal Nocturnal Dyspnea  Respiratory:  Cough, Wheezing, Shortness of breath, Chest tightness, Apnea  Gastrointestinal:  Nausea, Vomiting, Diarrhea, Constipation, Heartburn, Blood in stool  Genitourinary:  Difficulty or painful urination, Flank pain, Change in frequency, Urgency  Skin:  Color change, Rash, Itching, Wound  Musculoskeletal:  Joint pain, Back pain, Gait problems, Joint swelling, Myalgias  Neurological:  Dizziness, Headaches, Presyncope, Numbness, Seizures, Tremors  Endocrine:  Heat Intolerance, Cold Intolerance, Polydipsia, Polyphagia, Polyuria      PHYSICAL EXAM:  Vitals:    08/08/22 1404   BP: 120/72   Site: Right Upper Arm   Position: Sitting   Cuff Size: Large Adult   Pulse: 78   Resp: 16   Temp: 98.2 °F (36.8 °C)   SpO2: 95%   Weight: 131 lb 6.4 oz (59.6 kg)   Height: 4' 9\" (1.448 m)     Body mass index is 28.43 kg/m². VS Reviewed  General Appearance: A&O to person only, No acute distress,well developed and well- nourished  Eyes: pupils equal, round, and reactive to light, extraocular eye movements intact, conjunctivae and eye lids without erythema  ENT: external ear and ear canal clear bilaterally, TMs intact and regular, nose without deformity, nasal mucosa and turbinates normal without polyps, oropharynx normal, dentition is normal for age  Neck: supple and non-tender without mass, no thyromegaly or thyroid nodules, no cervical lymphadenopathy  Pulmonary/Chest: clear to auscultation bilaterally- no wheezes, rales or rhonchi, normal air movement, no respiratory distress or retractions  Cardiovascular: S1 and S2 auscultated w/ RRR. No murmurs, rubs, clicks, or gallops, distal pulses intact. Abdomen: soft, non-tender, non-distended, bowel sounds physiologic,  no rebound or guarding, no masses or hernias noted. Liver and spleen without enlargement. Extremities: no cyanosis, clubbing or edema of the lower extremities. +2 PT/DP bilaterally. Musculoskeletal: No joint swelling or gross deformity   Psych: Affect appropriate. Mood normal. Thought process is normal without evidence of depression or psychosis. Good insight and appropriate interaction. Cognition and memory appear to be impaored. Skin: warm and dry, no rash or erythema               ASSESSMENT & PLAN  1. Normocytic anemia    Unclear etiology  Was getting better, now worse again  B12 level appropriate, con't b12 for now  No obvious s/s bleeding  Will check labs below and have her f/u in 4 wks  F/u lab results by phone  Further w/u and intervention based on lab results    - CBC with Auto Differential; Future  - Ferritin; Future  - Folate; Future  - Iron; Future  - Iron Binding Capacity;  Future  - Reticulocytes; Future    2. B12 deficiency    As above    - CBC with Auto Differential; Future  - Ferritin; Future  - Folate; Future  - Iron; Future  - Iron Binding Capacity; Future  - Reticulocytes; Future    3. Hyponatremia    Very mild, but new  Will repeat BMP this wk to ensure accuracy   F/u based on results    - Basic Metabolic Panel; Future    4. Fatigue, unspecified type    Worse of late, suspect d/t worsening anemia  Check labs  F/u based on results    - CBC with Auto Differential; Future  - Basic Metabolic Panel; Future  - Ferritin; Future  - Folate; Future  - Iron; Future  - Iron Binding Capacity; Future  - Reticulocytes; Future    5. ASHD (arteriosclerotic heart disease)    Stable  con't current meds, DAPT, toprol, Valsartan and statin  Labs UTD  F/u cardio as well    6. Hypertension, essential    At goal  con't meds  Labs UTD    7. Dyslipidemia    Stable  con't lipitor     8. Gastroesophageal reflux disease, unspecified whether esophagitis present    Stable  con't pantoprazole    9. Fibromyalgia    Stable  con't ro    10. Glaucoma, unspecified glaucoma type, unspecified laterality    Stable  con't timolol drops. 11. Vertigo    Improved from last visit  Will monitor      DISPOSITION    Return in about 4 weeks (around 9/5/2022) for f/u anemia and low sodium, sooner as needed. [employer] released without restrictions. Future Appointments   Date Time Provider Bj Ahuja   9/7/2022 10:00 AM Timo Márquez DO [facility] [address] [facility]       PATIENT COUNSELING    Barriers to learning and self management: [place], daughter present. Discussed use, benefit, and side effects of prescribed medications. Barriers to medication compliance addressed. All patient questions answered. Pt voiced understanding.        Electronically signed by Timo Márquez DO on 8/8/2022 at 3:40 PM        Medicare Annual Wellness Visit    Yary Space is here for Medicare AWV, Anemia (Worse on recent labs), Other (Mild hyponatremia noted on recent labs), and Follow-up (Chronic issues as noted below)    Assessment & Plan   Medicare annual wellness visit, subsequent      Recommendations for Preventive Services Due: see orders and patient instructions/AVS.  Recommended screening schedule for the next 5-10 years is provided to the patient in written form: see Patient Instructions/AVS.     Return in about 4 weeks (around 9/5/2022) for f/u anemia and low sodium, sooner as needed. Subjective       Patient's complete Health Risk Assessment and screening values have been reviewed and are found in Flowsheets. The following problems were reviewed today and where indicated follow up appointments were made and/or referrals ordered.     Positive Risk Factor Screenings with Interventions:             General Health and ACP:  General  In general, how would you say your health is?: Good  In the past 7 days, have you experienced any of the following: New or Increased Pain, New or Increased Fatigue, Loneliness, Social Isolation, Stress or Anger?: (!) Yes  Select all that apply: (!) New or Increased Fatigue  Do you get the social and emotional support that you need?: Yes  Do you have a Living Will?: Yes    Advance Directives       Power of  Living Will ACP-Advance Directive ACP-Power of Esmer Holden on 11/02/21 Filed on 12/14/18 Filed [id number] [facility] [address] Risk Interventions:  Fatigue: await labs    Health Habits/Nutrition:  Physical Activity: Inactive    Days of Exercise per Week: 0 days    Minutes of Exercise per Session: 0 min     Have you lost any weight without trying in the past 3 months?: (!) Yes  Body mass index: (!) 28.43  Have you seen the dentist within the past year?: Yes  Health Habits/Nutrition Interventions:  Nutritional issues:  educational materials for healthy, well-balanced diet provided  Dental exam overdue:  patient encouraged to make appointment with his/her dentist    Hearing/Vision:  Do you or your family notice any trouble with your hearing that hasn't been managed with hearing aids?: (!) Yes  Do you have difficulty driving, watching TV, or doing any of your daily activities because of your eyesight?: (!) Yes  Have you had an eye exam within the past year?: (!) No  No results found. Hearing/Vision Interventions:  Hearing concerns:  patient declines any further evaluation/treatment for hearing issues  Vision concerns:  patient encouraged to make appointment with his/her eye specialist    Safety:  Do you have working smoke detectors?: Yes  Do you have any tripping hazards - loose or unsecured carpets or rugs?: No  Do you have either shower bars, grab bars, non-slip mats or non-slip surfaces in your shower or bathtub?: (!) No  Do all of your stairways have a railing or banister?: Yes  Do you always fasten your seatbelt when you are in a car?: Yes  Safety Interventions:  Home safety tips provided    ADLs:  In the past 7 days, did you need help from others to perform any of the following everyday activities: Eating, dressing, grooming, bathing, toileting, or walking/balance?: (!) Yes  Select all that apply: (!) Dressing, Grooming, Bathing  In the past 7 days, did you need help from others to take care of any of the following: Laundry, housekeeping, banking/finances, shopping, telephone use, food preparation, transportation, or taking medications?: (!) Yes  Select all that apply: [employer], Housekeeping, Banking/Finances, Shopping, Telephone Use, Food Preparation, Transportation, Taking Medications  ADL Interventions:  Lives in [place], has help with all these          Objective   Vitals:    08/08/22 1404   BP: 120/72   Site: Right Upper Arm   Position: Sitting   Cuff Size: Large Adult   Pulse: 78   Resp: 16   Temp: 98.2 °F (36.8 °C)   SpO2: 95%   Weight: 131 lb 6.4 oz (59.6 kg)   Height: 4' 9\" (1.448 m)      Body mass index is 28.43 kg/m².             Allergies   Allergen Reactions    Codeine     Diazepam Lisinopril     Simvastatin      Muscle aches    Valtrex [Valacyclovir Hcl]     Sulfa Antibiotics Rash     Prior to Visit Medications    Medication Sig Taking? Authorizing Provider   metoprolol succinate (TOPROL XL) 25 MG extended release tablet TAKE  1/2 ( ONE-HALF) TABLETS BY MOUTH ONCE DAILY IN THE MORNING AND THEN TAKE 1 & 1/2 (ONE & ONE-HALF) TABS ONCE DAILY AT BEDTIME Yes Jeff Rodriguez,    gabapentin (NEURONTIN) 400 MG capsule TAKE 1 CAPSULE BY MOUTH THREE TIMES DAILY Yes Jeff Rodriguez DO   atorvastatin (LIPITOR) 10 MG tablet Take 1 tablet by mouth once daily Yes Priya Oliva MD   clopidogrel (PLAVIX) 75 MG tablet Take 1 tablet by mouth once daily Yes Priya Oliva MD   pantoprazole (PROTONIX) 40 MG tablet Take 1 tab daily Yes Priya Oliva MD   valsartan (DIOVAN) 320 MG tablet Take 1 tablet by mouth once daily Yes Priya Oliva MD   amLODIPine (NORVASC) 5 MG tablet Take 1 tablet by mouth once daily Yes GABRIELA Muller CNP   mirtazapine (REMERON) 15 MG tablet TAKE 1/2 (ONE-HALF) TABLET BY MOUTH NIGHTLY Yes Milus Alfie, DO   meclizine (ANTIVERT) 12.5 MG tablet Take 1 tablet by mouth 3 times daily as needed for Dizziness Yes Milus Alfie, DO   cyanocobalamin ([employer]S VITAMIN B12) 1000 MCG tablet Take 1 tablet by mouth daily Yes Milus Alfie, DO   SENNA CO by Combination route Yes Historical Provider, MD   carbamide peroxide (DEBROX) 6.5 % otic solution 5 drops as needed Yes Historical Provider, MD   lidocaine-prilocaine (EMLA) 2.5-2.5 % cream Apply topically as needed for Pain Apply topically as needed.  Yes Historical Provider, MD   acetaminophen (TYLENOL) 500 MG tablet Take 500 mg by mouth every 6 hours as needed for Pain Yes Historical Provider, MD   timolol (TIMOPTIC) 0.5 % ophthalmic solution 1 drop 2 times daily Yes Historical Provider, MD   famotidine (PEPCID) 40 MG tablet Take 40 mg by mouth daily Yes Historical Provider, MD   sennosides-docusate sodium (SENOKOT-S) 8.6-50 MG tablet Take 1 tablet by mouth daily Yes Historical Provider, MD   Cholecalciferol (VITAMIN D3) 125 MCG (5000 UT) TABS Take 1 tablet by mouth daily Yes Historical Provider, MD   ondansetron (ZOFRAN) 4 MG tablet Take 4 mg by mouth every 8 hours as needed for Nausea or Vomiting Yes Historical Provider, MD   nitroGLYCERIN (NITROSTAT) 0.4 MG SL tablet Place 1 tablet under the tongue every 5 minutes as needed for Chest pain Yes Ann Daniels DO   Calcium Citrate-Vitamin D (CALCIUM CITRATE + PO) Take 600 mg by mouth 2 times daily Yes Historical Provider, MD   aspirin 81 MG chewable tablet Take 1 tablet by mouth daily. Yes Ami Schwartz APRN - CNP   Multiple Vitamins-Minerals (OCUVITE) TABS oral tablet Take 1 tablet by mouth daily. Yes Historical Provider, MD   Flaxseed, Linseed, (FLAX SEED OIL) 1000 MG CAPS Take 1 capsule by mouth 2 times daily. Yes Historical Provider, MD Harris (Including outside providers/suppliers regularly involved in providing care):   Patient Care Team:  Marixa Rivsa DO as PCP - General (Family Medicine)  Marixa Rivas DO as PCP - REHABILITATION [facility] Empaneled Provider  Hermelindo Power MD as Cardiologist (Cardiology)     Reviewed and updated this visit:  Tobacco  Allergies  Meds  Problems  Med Hx  Surg Hx  Soc Hx  Fam Hx                 Care plan reviewed with and given to patient.        Electronically signed by Marixa Rivas DO on 8/8/2022 at 3:40 PM

## 2022-08-08 ENCOUNTER — OFFICE VISIT (OUTPATIENT)
Dept: FAMILY MEDICINE CLINIC | Age: 87
End: 2022-08-08
Payer: MEDICARE

## 2022-08-08 VITALS
DIASTOLIC BLOOD PRESSURE: 72 MMHG | SYSTOLIC BLOOD PRESSURE: 120 MMHG | WEIGHT: 131.4 LBS | TEMPERATURE: 98.2 F | BODY MASS INDEX: 28.35 KG/M2 | OXYGEN SATURATION: 95 % | HEIGHT: 57 IN | HEART RATE: 78 BPM | RESPIRATION RATE: 16 BRPM

## 2022-08-08 DIAGNOSIS — R53.83 FATIGUE, UNSPECIFIED TYPE: ICD-10-CM

## 2022-08-08 DIAGNOSIS — D64.9 NORMOCYTIC ANEMIA: ICD-10-CM

## 2022-08-08 DIAGNOSIS — I10 HYPERTENSION, ESSENTIAL: ICD-10-CM

## 2022-08-08 DIAGNOSIS — M79.7 FIBROMYALGIA: ICD-10-CM

## 2022-08-08 DIAGNOSIS — I25.10 ASHD (ARTERIOSCLEROTIC HEART DISEASE): ICD-10-CM

## 2022-08-08 DIAGNOSIS — K21.9 GASTROESOPHAGEAL REFLUX DISEASE, UNSPECIFIED WHETHER ESOPHAGITIS PRESENT: ICD-10-CM

## 2022-08-08 DIAGNOSIS — H40.9 GLAUCOMA, UNSPECIFIED GLAUCOMA TYPE, UNSPECIFIED LATERALITY: ICD-10-CM

## 2022-08-08 DIAGNOSIS — Z00.00 MEDICARE ANNUAL WELLNESS VISIT, SUBSEQUENT: Primary | ICD-10-CM

## 2022-08-08 DIAGNOSIS — E87.1 HYPONATREMIA: ICD-10-CM

## 2022-08-08 DIAGNOSIS — E53.8 B12 DEFICIENCY: ICD-10-CM

## 2022-08-08 DIAGNOSIS — R42 VERTIGO: ICD-10-CM

## 2022-08-08 DIAGNOSIS — E78.5 DYSLIPIDEMIA: ICD-10-CM

## 2022-08-08 PROCEDURE — 99214 OFFICE O/P EST MOD 30 MIN: CPT | Performed by: FAMILY MEDICINE

## 2022-08-08 PROCEDURE — G0439 PPPS, SUBSEQ VISIT: HCPCS | Performed by: FAMILY MEDICINE

## 2022-08-08 PROCEDURE — G8417 CALC BMI ABV UP PARAM F/U: HCPCS | Performed by: FAMILY MEDICINE

## 2022-08-08 PROCEDURE — 1036F TOBACCO NON-USER: CPT | Performed by: FAMILY MEDICINE

## 2022-08-08 PROCEDURE — 1090F PRES/ABSN URINE INCON ASSESS: CPT | Performed by: FAMILY MEDICINE

## 2022-08-08 PROCEDURE — 1123F ACP DISCUSS/DSCN MKR DOCD: CPT | Performed by: FAMILY MEDICINE

## 2022-08-08 PROCEDURE — G8427 DOCREV CUR MEDS BY ELIG CLIN: HCPCS | Performed by: FAMILY MEDICINE

## 2022-08-08 SDOH — ECONOMIC STABILITY: FOOD INSECURITY: WITHIN THE PAST 12 MONTHS, THE FOOD YOU BOUGHT JUST DIDN'T LAST AND YOU DIDN'T HAVE MONEY TO GET MORE.: NEVER TRUE

## 2022-08-08 SDOH — ECONOMIC STABILITY: FOOD INSECURITY: WITHIN THE PAST 12 MONTHS, YOU WORRIED THAT YOUR FOOD WOULD RUN OUT BEFORE YOU GOT MONEY TO BUY MORE.: NEVER TRUE

## 2022-08-08 ASSESSMENT — PATIENT HEALTH QUESTIONNAIRE - PHQ9
SUM OF ALL RESPONSES TO PHQ QUESTIONS 1-9: 0
SUM OF ALL RESPONSES TO PHQ QUESTIONS 1-9: 0
2. FEELING DOWN, DEPRESSED OR HOPELESS: 0
SUM OF ALL RESPONSES TO PHQ QUESTIONS 1-9: 0
1. LITTLE INTEREST OR PLEASURE IN DOING THINGS: 0
SUM OF ALL RESPONSES TO PHQ9 QUESTIONS 1 & 2: 0
SUM OF ALL RESPONSES TO PHQ QUESTIONS 1-9: 0

## 2022-08-08 ASSESSMENT — LIFESTYLE VARIABLES
HOW OFTEN DO YOU HAVE A DRINK CONTAINING ALCOHOL: NEVER
HOW MANY STANDARD DRINKS CONTAINING ALCOHOL DO YOU HAVE ON A TYPICAL DAY: PATIENT DOES NOT DRINK

## 2022-08-08 ASSESSMENT — SOCIAL DETERMINANTS OF HEALTH (SDOH): HOW HARD IS IT FOR YOU TO PAY FOR THE VERY BASICS LIKE FOOD, HOUSING, MEDICAL CARE, AND HEATING?: NOT HARD AT ALL

## 2022-08-08 NOTE — PATIENT INSTRUCTIONS
LAB INSTRUCTIONS:    Please complete labs this week. Please fast for 8 hours prior to lab collection. The clinic will call you within 1 week of collection. If you have not heard from us within that amount of time, please call us at [phone]. Personalized Preventive Plan for Tay Arreola - 8/8/2022  Medicare offers a range of preventive health benefits. Some of the tests and screenings are paid in full while other may be subject to a deductible, co-insurance, and/or copay. Some of these benefits include a comprehensive review of your medical history including lifestyle, illnesses that may run in your family, and various assessments and screenings as appropriate. After reviewing your medical record and screening and assessments performed today your provider may have ordered immunizations, labs, imaging, and/or referrals for you. A list of these orders (if applicable) as well as your Preventive Care list are included within your After Visit Summary for your review. Other Preventive Recommendations:    A preventive eye exam performed by an eye specialist is recommended every 1-2 years to screen for glaucoma; cataracts, macular degeneration, and other eye disorders. A preventive dental visit is recommended every 6 months. Try to get at least 150 minutes of exercise per week or 10,000 steps per day on a pedometer . Order or download the FREE \"Exercise & Physical Activity: Your Everyday Guide\" from [employer]. Call 9-370.493.9276 or search [employer] online. You need 4393-0282 mg of calcium and 5505-6240 IU of vitamin D per day. It is possible to meet your calcium requirement with diet alone, but a vitamin D supplement is usually necessary to meet this goal.  When exposed to the sun, use a sunscreen that protects against both UVA and UVB radiation with an SPF of 30 or greater.  Reapply every 2 to 3 hours or after sweating, drying off with a towel, or

## 2022-08-15 ENCOUNTER — TELEPHONE (OUTPATIENT)
Dept: FAMILY MEDICINE CLINIC | Age: 87
End: 2022-08-15

## 2022-08-15 DIAGNOSIS — D50.9 IRON DEFICIENCY ANEMIA, UNSPECIFIED IRON DEFICIENCY ANEMIA TYPE: Primary | ICD-10-CM

## 2022-08-15 RX ORDER — FERROUS SULFATE 325(65) MG
325 TABLET ORAL 2 TIMES DAILY WITH MEALS
Qty: 180 TABLET | Refills: 1 | Status: SHIPPED | OUTPATIENT
Start: 2022-08-15

## 2022-08-15 NOTE — TELEPHONE ENCOUNTER
----- Message from Jf Durant DO sent at 8/15/2022 12:08 PM EDT -----  Please let pts  and daughter know that labs are back  -Na+ is better  -Anemia present, but stable  -B12 and folate levels are WNL  -Her iron level, however is low. As discussed in clinic, how would they like to proceed? At the last, we should start her on iron twice daily and repeat labs in 6 wks  However, we'd discussed briefly in the office if they would want Bonifacio Aguilar to have a GI w/u (like EGD and colo) to r/o bleeding or cancer. Let me know if they want to pursue that. Meds sent to pharmacy. Labs ordered. Let me know if questions, thanks!

## 2022-08-15 NOTE — TELEPHONE ENCOUNTER
Patient informed and verbalized understanding. They state D/T  patients age they would just like to proceed with iron twice daily and repeat labs in 6 wks.

## 2022-09-06 NOTE — PROGRESS NOTES
Chief Complaint   Patient presents with    Follow-up     4 week f/u for anemia low sodium         History obtained from daughter and the patient. SUBJECTIVE:  Yeyo Serna is a 80 y.o. female that presents today for     -Anemia/B12 def LAST VISIT:  Noted on labs  Declined aggressive w/u previously  Is DNR/CC  On b12 now, and labs had been improving  However, had labs done last wk that showed hb down to 8.8, was 11.6 in [facility] 2022  Denies bleeding, melena or hematochezia  Does have inc fatigue the last 4 wks  No CP/SOB    UPDATE TODAY:   Here for f/u  Dx with new GORDO 4 wks ago  Started on iron  D/w family about further w/u, including EGD/[place].  They declined based on pts age and other comorbid conditions  Is tolerating iron well  No bleeding, melena or hematochezia  No abd pain       -Hyponatremia LAST VISIT:   Noted on labs, mild, 131  Never low before  No new meds  Appetite good  Wts stable    UPDATE TODAY:   F/u labs improved  Appetite good     -Fatigue LAST VISIT:  Worse the last month  Worsen anemia as above    UPDATE TODAY:   Doing better on iron  Fatigue better per pt, daughter       Age/Gender Health Maintenance    Lipid - ; LDL 66; HDL 45; TG 95 (AUG 2022)    Lab Results   Component Value Date    CHOL 160 04/06/2021    CHOL 154 04/01/2017    CHOL 180 08/31/2015     Lab Results   Component Value Date    TRIG 102 04/06/2021    TRIG 105 04/01/2017    TRIG 91 08/31/2015     Lab Results   Component Value Date    HDL 65 04/06/2021    HDL 67 04/01/2017    HDL 80 08/31/2015     Lab Results   Component Value Date    LDLCALC 75 04/06/2021    [address] 66 04/01/2017    [address] 82 08/31/2015     Lab Results   Component Value Date    VLDL 20 04/06/2021     No results found for: CHOLHDLRATIO      DM Screen - 75 (APR 2021)    Lab Results   Component Value Date/Time    GLUCOSE 107 05/20/2020 12:14 PM    GLUCOSE 77 11/14/2018 03:03 PM       Colon Cancer Screening - aged out  Lung Cancer Screening - aged out    Tetanus - to get at pharmacy per medicare rules  Influenza Vaccine - UTD FALL 2021  Pneumonia Vaccine - UTD  Zoster - to get at pharmacy per medicare rules     Breast Cancer Screening - aged out  Cervical Cancer Screening - aged out  Osteoporosis Screening - declines JAN 2021      Current Outpatient Medications   Medication Sig Dispense Refill    ferrous sulfate (IRON 325) 325 (65 Fe) MG tablet Take 1 tablet by mouth in the morning and 1 tablet in the evening. Take with meals. 180 tablet 1    metoprolol succinate (TOPROL XL) 25 MG extended release tablet TAKE  1/2 ( ONE-HALF) TABLETS BY MOUTH ONCE DAILY IN THE MORNING AND THEN TAKE 1 & 1/2 (ONE & ONE-HALF) TABS ONCE DAILY AT BEDTIME 180 tablet 3    gabapentin (NEURONTIN) 400 MG capsule TAKE 1 CAPSULE BY MOUTH THREE TIMES DAILY 90 capsule 11    atorvastatin (LIPITOR) 10 MG tablet Take 1 tablet by mouth once daily 90 tablet 0    clopidogrel (PLAVIX) 75 MG tablet Take 1 tablet by mouth once daily 90 tablet 0    pantoprazole (PROTONIX) 40 MG tablet Take 1 tab daily 90 tablet 3    valsartan (DIOVAN) 320 MG tablet Take 1 tablet by mouth once daily 90 tablet 3    amLODIPine (NORVASC) 5 MG tablet Take 1 tablet by mouth once daily 90 tablet 3    mirtazapine (REMERON) 15 MG tablet TAKE 1/2 (ONE-HALF) TABLET BY MOUTH NIGHTLY 45 tablet 3    meclizine (ANTIVERT) 12.5 MG tablet Take 1 tablet by mouth 3 times daily as needed for Dizziness 60 tablet 1    cyanocobalamin ([employer] VITAMIN B12) 1000 MCG tablet Take 1 tablet by mouth daily 90 tablet 1    SENNA CO by Combination route      carbamide peroxide (DEBROX) 6.5 % otic solution 5 drops as needed      lidocaine-prilocaine (EMLA) 2.5-2.5 % cream Apply topically as needed for Pain Apply topically as needed.       acetaminophen (TYLENOL) 500 MG tablet Take 500 mg by mouth every 6 hours as needed for Pain      timolol (TIMOPTIC) 0.5 % ophthalmic solution 1 drop 2 times daily      famotidine (PEPCID) 40 MG tablet Take 40 mg by mouth daily      sennosides-docusate sodium (SENOKOT-S) 8.6-50 MG tablet Take 1 tablet by mouth daily      Cholecalciferol (VITAMIN D3) 125 MCG (5000 UT) TABS Take 1 tablet by mouth daily      ondansetron (ZOFRAN) 4 MG tablet Take 4 mg by mouth every 8 hours as needed for Nausea or Vomiting      nitroGLYCERIN (NITROSTAT) 0.4 MG SL tablet Place 1 tablet under the tongue every 5 minutes as needed for Chest pain 25 tablet 3    Calcium Citrate-Vitamin D (CALCIUM CITRATE + PO) Take 600 mg by mouth 2 times daily      aspirin 81 MG chewable tablet Take 1 tablet by mouth daily. 30 tablet 3    Multiple Vitamins-Minerals (OCUVITE) TABS oral tablet Take 1 tablet by mouth daily. Flaxseed, Linseed, (FLAX SEED OIL) 1000 MG CAPS Take 1 capsule by mouth 2 times daily. No current facility-administered medications for this visit. No orders of the defined types were placed in this encounter. All medications reviewed and reconciled, including OTC and herbal medications. Updated list given to patient. Patient Active Problem List    Diagnosis Date Noted    Iron deficiency anemia 08/15/2022     Priority: Medium    ASHD (arteriosclerotic heart disease)     Constipation     DDD (degenerative disc disease), lumbar     Dyslipidemia     Glaucoma     History of vertebral compression fracture; L1     Hypertension, essential     S/P PTCA: 2/2/2015: Stenting mid-LAD using Xience Alpine 2.75 X 28 mm, post-dilated to 3.0 mm distally and 3.18 mm proximally. 02/02/2015 2/2/2015: Stenting mid-LAD using Xience Alpine 2.75 X 28 mm, post-dilated to 3.0 mm distally and 3.18 mm proximally.   Dr. Pastor Senior      Tinnitus of both ears 11/19/2013    Status post tympanoplasty 06/24/2013    VMR (vasomotor rhinitis) 10/09/2012       Past Medical History:   Diagnosis Date    ASHD (arteriosclerotic heart disease)     Constipation     DDD (degenerative disc disease), lumbar     Dyslipidemia     Fibromyalgia     GERD (gastroesophageal reflux disease)     Glaucoma     History of vertebral compression fracture; L1     Hypertension, essential     Nausea & vomiting     Osteoporosis     S/P PTCA: 2/2/2015: Stenting mid-LAD using Xience Alpine 2.75 X 28 mm, post-dilated to 3.0 mm distally and 3.18 mm proximally. 02/02/2015 2/2/2015: Stenting mid-LAD using Xience Alpine 2.75 X 28 mm, post-dilated to 3.0 mm distally and 3.18 mm proximally. Dr. Eloisa Sampson    Status post tympanoplasty 6/24/2013    Tinnitus of both ears 11/19/2013       Past Surgical History:   Procedure Laterality Date    APPENDECTOMY  1940    CARDIAC CATHETERIZATION  2015    stent x1    COLON SURGERY  1994    COLONOSCOPY  07/19/2005    EYE SURGERY  2009    left corneal transplant    EYE SURGERY  2008    right corneal transplant    EYE SURGERY      bilateral cataract    [facility] PAIN BLOCK Left 12/14/2018    Synvisc ONE LEFT knee performed by Digna Garnica MD at [address] [place] [address] ([address] [place] [address])  1969    hysterectomy    KYPHOSIS SURGERY  2017    L-1 compression fracture    MIDDLE EAR SURGERY  2005    right tympnaoplasty by dr gipson    TYMPANOPLASTY Right 05/03/2013       Allergies   Allergen Reactions    Codeine     Diazepam     Lisinopril     Simvastatin      Muscle aches    Valtrex [Valacyclovir Hcl]     Sulfa Antibiotics Rash       Social History     Tobacco Use    Smoking status: Never    Smokeless tobacco: Never   Substance Use Topics    Alcohol use: No       Family History   Problem Relation Age of Onset    Arthritis Mother     Heart Disease Father     Heart Disease Sister     Heart Disease Brother     Heart Disease Sister     Heart Disease Sister        I have reviewed the patient's past medical history, past surgical history, allergies, medications, social and family history and I have made updates where appropriate.       Review of Systems  Positive responses are highlighted in bold    Constitutional:  Fever, Chills, Night Sweats, Fatigue, Unexpected changes in weight  HENT:  Ear pain, Tinnitus, Nosebleeds, Trouble swallowing, Hearing loss, Sore throat  Cardiovascular:  Chest Pain, Palpitations, Orthopnea, Paroxysmal Nocturnal Dyspnea  Respiratory:  Cough, Wheezing, Shortness of breath, Chest tightness, Apnea  Gastrointestinal:  Nausea, Vomiting, Diarrhea, Constipation, Heartburn, Blood in stool  Genitourinary:  Difficulty or painful urination, Flank pain, Change in frequency, Urgency  Skin:  Color change, Rash, Itching, Wound  Musculoskeletal:  Joint pain, Back pain, Gait problems, Joint swelling, Myalgias  Neurological:  Dizziness, Headaches, Presyncope, Numbness, Seizures, Tremors  Endocrine:  Heat Intolerance, Cold Intolerance, Polydipsia, Polyphagia, Polyuria      PHYSICAL EXAM:  Vitals:    09/07/22 1002   BP: 132/68   Pulse: 70   Resp: 14   Temp: 97.6 °F (36.4 °C)   TempSrc: Temporal   SpO2: 98%   Weight: 131 lb 9.6 oz (59.7 kg)   Height: 4' 9\" (1.448 m)     Body mass index is 28.48 kg/m². VS Reviewed  General Appearance: A&O to person only, No acute distress,well developed and well- nourished  Eyes: pupils equal, round, and reactive to light, extraocular eye movements intact, conjunctivae and eye lids without erythema  ENT: external ear and ear canal clear bilaterally, TMs intact and regular, nose without deformity, nasal mucosa and turbinates normal without polyps, oropharynx normal, dentition is normal for age  Neck: supple and non-tender without mass, no thyromegaly or thyroid nodules, no cervical lymphadenopathy  Pulmonary/Chest: clear to auscultation bilaterally- no wheezes, rales or rhonchi, normal air movement, no respiratory distress or retractions  Cardiovascular: S1 and S2 auscultated w/ RRR. No murmurs, rubs, clicks, or gallops, distal pulses intact. Abdomen: soft, non-tender, non-distended, bowel sounds physiologic,  no rebound or guarding, no masses or hernias noted. Liver and spleen without enlargement.    Extremities: no cyanosis, clubbing or edema of the lower extremities. +2 PT/DP bilaterally. Musculoskeletal: No joint swelling or gross deformity   Psych: Affect appropriate. Mood normal. Thought process is normal without evidence of depression or psychosis. Good insight and appropriate interaction. Cognition and memory appear to be impaored. Skin: warm and dry, no rash or erythema                     ASSESSMENT & PLAN  1. Iron deficiency anemia, unspecified iron deficiency anemia type    New dx  Con't iron  Discussed again with pt and daughter further w/u  I agree that based on her age, fraility, that a w/u wouldn't yield much helpful information  They would not intervene on anything anyway  They are aware we could be missing a cancer etc and are accepting  Con't iron  Labs ordered for end of SEPT  Adjust based on those results    2. B12 deficiency    Stable  Con't b12    3. Hyponatremia    Improved  Monitor   F/u labs ordered    4. Fatigue, unspecified type    Improving  Con't iron  Monitor       DISPOSITION    Return in about 5 months (around 2/7/2023) for follow-up on chronic medical conditions, sooner as needed. Rubi Reddy released without restrictions. Future Appointments   Date Time Provider Bj Ahuja   2/7/2023  2:00 PM Marixa Rivas DO [facility] [address] [facility]     PATIENT COUNSELING    Barriers to learning and self management: [place], daughter present. Discussed use, benefit, and side effects of prescribed medications. Barriers to medication compliance addressed. All patient questions answered. Pt voiced understanding.        Electronically signed by Marixa Rivas DO on 9/7/2022 at 10:39 AM

## 2022-09-07 ENCOUNTER — OFFICE VISIT (OUTPATIENT)
Dept: FAMILY MEDICINE CLINIC | Age: 87
End: 2022-09-07
Payer: MEDICARE

## 2022-09-07 VITALS
BODY MASS INDEX: 28.39 KG/M2 | SYSTOLIC BLOOD PRESSURE: 132 MMHG | WEIGHT: 131.6 LBS | DIASTOLIC BLOOD PRESSURE: 68 MMHG | TEMPERATURE: 97.6 F | OXYGEN SATURATION: 98 % | HEART RATE: 70 BPM | RESPIRATION RATE: 14 BRPM | HEIGHT: 57 IN

## 2022-09-07 DIAGNOSIS — D50.9 IRON DEFICIENCY ANEMIA, UNSPECIFIED IRON DEFICIENCY ANEMIA TYPE: Primary | ICD-10-CM

## 2022-09-07 DIAGNOSIS — R53.83 FATIGUE, UNSPECIFIED TYPE: ICD-10-CM

## 2022-09-07 DIAGNOSIS — E53.8 B12 DEFICIENCY: ICD-10-CM

## 2022-09-07 DIAGNOSIS — E87.1 HYPONATREMIA: ICD-10-CM

## 2022-09-07 PROCEDURE — 99214 OFFICE O/P EST MOD 30 MIN: CPT | Performed by: FAMILY MEDICINE

## 2022-09-07 PROCEDURE — 1036F TOBACCO NON-USER: CPT | Performed by: FAMILY MEDICINE

## 2022-09-07 PROCEDURE — 1123F ACP DISCUSS/DSCN MKR DOCD: CPT | Performed by: FAMILY MEDICINE

## 2022-09-07 PROCEDURE — G8427 DOCREV CUR MEDS BY ELIG CLIN: HCPCS | Performed by: FAMILY MEDICINE

## 2022-09-07 PROCEDURE — G8417 CALC BMI ABV UP PARAM F/U: HCPCS | Performed by: FAMILY MEDICINE

## 2022-09-07 PROCEDURE — 1090F PRES/ABSN URINE INCON ASSESS: CPT | Performed by: FAMILY MEDICINE

## 2022-09-27 LAB
BASOPHILS ABSOLUTE: ABNORMAL
BASOPHILS RELATIVE PERCENT: ABNORMAL
EOSINOPHILS ABSOLUTE: ABNORMAL
EOSINOPHILS RELATIVE PERCENT: ABNORMAL
FERRITIN: 23 NG/ML (ref 9–150)
HCT VFR BLD CALC: 32.9 % (ref 36–46)
HEMOGLOBIN: 10.9 G/DL (ref 12–16)
IRON: 59
LYMPHOCYTES ABSOLUTE: ABNORMAL
LYMPHOCYTES RELATIVE PERCENT: ABNORMAL
MCH RBC QN AUTO: ABNORMAL PG
MCHC RBC AUTO-ENTMCNC: ABNORMAL G/DL
MCV RBC AUTO: ABNORMAL FL
MONOCYTES ABSOLUTE: ABNORMAL
MONOCYTES RELATIVE PERCENT: ABNORMAL
NEUTROPHILS ABSOLUTE: ABNORMAL
NEUTROPHILS RELATIVE PERCENT: ABNORMAL
PLATELET # BLD: 217 K/ΜL
PMV BLD AUTO: ABNORMAL FL
RBC # BLD: 3.69 10^6/ΜL
TOTAL IRON BINDING CAPACITY: 329
WBC # BLD: 5.2 10^3/ML

## 2022-09-29 DIAGNOSIS — D50.9 IRON DEFICIENCY ANEMIA, UNSPECIFIED IRON DEFICIENCY ANEMIA TYPE: Primary | ICD-10-CM

## 2022-09-30 ENCOUNTER — TELEPHONE (OUTPATIENT)
Dept: FAMILY MEDICINE CLINIC | Age: 87
End: 2022-09-30

## 2022-09-30 NOTE — TELEPHONE ENCOUNTER
----- Message from [employer] Dec, DO sent at 9/29/2022  8:03 PM EDT -----  Please let pts /daughter know that anemia is improving and iron stores are doing better. Con't iron supplementation and plan for repeat cbc,iron labs in 6 wks, fasting. Labs ordered. Let me know if questions, thanks!

## 2022-10-04 ENCOUNTER — TELEPHONE (OUTPATIENT)
Dept: FAMILY MEDICINE CLINIC | Age: 87
End: 2022-10-04

## 2022-10-04 NOTE — PROGRESS NOTES
Chief Complaint   Patient presents with    Follow-up     Growth on anus ( possible hemorrhoid) 10/3/2022       History obtained from daughter and the patient. SUBJECTIVE:  Catalina Mae is a 80 y.o. female that presents today for     -Anal problem, ? hemorrhoid:  Noted on Monday with some sort of small rectal mass and bleeding  Mild pain  Better now  Not noticed before  Stools have been a bit harder since on iron for GORDO      -Anemia/B12 def PRIOR VISIT:  Noted on labs  Declined aggressive w/u previously  Is DNR/CC  On b12 now, and labs had been improving  However, had labs done last wk that showed hb down to 8.8, was 11.6 in [facility] 2022  Denies bleeding, melena or hematochezia  Does have inc fatigue the last 4 wks  No CP/SOB    UPDATE LAST VISIT:   Here for f/u  Dx with new GORDO 4 wks ago  Started on iron  D/w family about further w/u, including EGD/[place]. They declined based on pts age and other comorbid conditions  Is tolerating iron well  No bleeding, melena or hematochezia  No abd pain     UPDATE TODAY:   On iron  F/u labs improving, last H/H 10.9  No melena or hematochezia  D/w family about further w/u, including EGD/[place].  They declined based on pts age and other comorbid conditions      Age/Gender Health Maintenance    Lipid - ; LDL 66; HDL 45; TG 95 (AUG 2022)    Lab Results   Component Value Date    CHOL 160 04/06/2021    CHOL 154 04/01/2017    CHOL 180 08/31/2015     Lab Results   Component Value Date    TRIG 102 04/06/2021    TRIG 105 04/01/2017    TRIG 91 08/31/2015     Lab Results   Component Value Date    HDL 65 04/06/2021    HDL 67 04/01/2017    HDL 80 08/31/2015     Lab Results   Component Value Date    LDLCALC 75 04/06/2021    LDLCALC 66 04/01/2017    LDLCALC 82 08/31/2015     Lab Results   Component Value Date    VLDL 20 04/06/2021     No results found for: CHOLHDLRATIO      DM Screen - 75 (APR 2021)    Lab Results   Component Value Date/Time    GLUCOSE 107 05/20/2020 12:14 PM    GLUCOSE 77 11/14/2018 03:03 PM       Colon Cancer Screening - aged out  Lung Cancer Screening - aged out    Tetanus - to get at pharmacy per medicare rules  Influenza Vaccine - UTD FALL 2021  Pneumonia Vaccine - UTD  Zoster - to get at pharmacy per medicare rules     Breast Cancer Screening - aged out  Cervical Cancer Screening - aged out  Osteoporosis Screening - declines JAN 2021      Current Outpatient Medications   Medication Sig Dispense Refill    polyethylene glycol (GLYCOLAX) 17 GM/SCOOP powder Take 17 g by mouth daily 1530 g 1    ferrous sulfate (IRON 325) 325 (65 Fe) MG tablet Take 1 tablet by mouth in the morning and 1 tablet in the evening. Take with meals. 180 tablet 1    metoprolol succinate (TOPROL XL) 25 MG extended release tablet TAKE  1/2 ( ONE-HALF) TABLETS BY MOUTH ONCE DAILY IN THE MORNING AND THEN TAKE 1 & 1/2 (ONE & ONE-HALF) TABS ONCE DAILY AT BEDTIME 180 tablet 3    gabapentin (NEURONTIN) 400 MG capsule TAKE 1 CAPSULE BY MOUTH THREE TIMES DAILY 90 capsule 11    atorvastatin (LIPITOR) 10 MG tablet Take 1 tablet by mouth once daily 90 tablet 0    clopidogrel (PLAVIX) 75 MG tablet Take 1 tablet by mouth once daily 90 tablet 0    pantoprazole (PROTONIX) 40 MG tablet Take 1 tab daily 90 tablet 3    valsartan (DIOVAN) 320 MG tablet Take 1 tablet by mouth once daily 90 tablet 3    amLODIPine (NORVASC) 5 MG tablet Take 1 tablet by mouth once daily 90 tablet 3    mirtazapine (REMERON) 15 MG tablet TAKE 1/2 (ONE-HALF) TABLET BY MOUTH NIGHTLY 45 tablet 3    meclizine (ANTIVERT) 12.5 MG tablet Take 1 tablet by mouth 3 times daily as needed for Dizziness 60 tablet 1    cyanocobalamin ([employer]S VITAMIN B12) 1000 MCG tablet Take 1 tablet by mouth daily 90 tablet 1    SENNA CO by Combination route      carbamide peroxide (DEBROX) 6.5 % otic solution 5 drops as needed      lidocaine-prilocaine (EMLA) 2.5-2.5 % cream Apply topically as needed for Pain Apply topically as needed.       acetaminophen (TYLENOL) 500 MG tablet Take 500 mg by mouth every 6 hours as needed for Pain      timolol (TIMOPTIC) 0.5 % ophthalmic solution 1 drop 2 times daily      famotidine (PEPCID) 40 MG tablet Take 40 mg by mouth daily      sennosides-docusate sodium (SENOKOT-S) 8.6-50 MG tablet Take 1 tablet by mouth daily      Cholecalciferol (VITAMIN D3) 125 MCG (5000 UT) TABS Take 1 tablet by mouth daily      ondansetron (ZOFRAN) 4 MG tablet Take 4 mg by mouth every 8 hours as needed for Nausea or Vomiting      nitroGLYCERIN (NITROSTAT) 0.4 MG SL tablet Place 1 tablet under the tongue every 5 minutes as needed for Chest pain 25 tablet 3    Calcium Citrate-Vitamin D (CALCIUM CITRATE + PO) Take 600 mg by mouth 2 times daily      aspirin 81 MG chewable tablet Take 1 tablet by mouth daily. 30 tablet 3    Multiple Vitamins-Minerals (OCUVITE) TABS oral tablet Take 1 tablet by mouth daily. Flaxseed, Linseed, (FLAX SEED OIL) 1000 MG CAPS Take 1 capsule by mouth 2 times daily. No current facility-administered medications for this visit. Orders Placed This Encounter   Medications    polyethylene glycol (GLYCOLAX) 17 GM/SCOOP powder     Sig: Take 17 g by mouth daily     Dispense:  1530 g     Refill:  1         All medications reviewed and reconciled, including OTC and herbal medications. Updated list given to patient. Patient Active Problem List    Diagnosis Date Noted    Iron deficiency anemia 08/15/2022     Priority: Medium    ASHD (arteriosclerotic heart disease)     Constipation     DDD (degenerative disc disease), lumbar     Dyslipidemia     Glaucoma     History of vertebral compression fracture; L1     Hypertension, essential     S/P PTCA: 2/2/2015: Stenting mid-LAD using Xience Alpine 2.75 X 28 mm, post-dilated to 3.0 mm distally and 3.18 mm proximally. 02/02/2015 2/2/2015: Stenting mid-LAD using Xience Alpine 2.75 X 28 mm, post-dilated to 3.0 mm distally and 3.18 mm proximally.   Dr. Ciro Soares      Tinnitus of both ears 11/19/2013 Status post tympanoplasty 06/24/2013    VMR (vasomotor rhinitis) 10/09/2012       Past Medical History:   Diagnosis Date    ASHD (arteriosclerotic heart disease)     Constipation     DDD (degenerative disc disease), lumbar     Dyslipidemia     Fibromyalgia     GERD (gastroesophageal reflux disease)     Glaucoma     History of vertebral compression fracture; L1     Hypertension, essential     Nausea & vomiting     Osteoporosis     S/P PTCA: 2/2/2015: Stenting mid-LAD using Xience Alpine 2.75 X 28 mm, post-dilated to 3.0 mm distally and 3.18 mm proximally. 02/02/2015 2/2/2015: Stenting mid-LAD using Xience Alpine 2.75 X 28 mm, post-dilated to 3.0 mm distally and 3.18 mm proximally.  Dr. Sang Higginbotham    Status post tympanoplasty 6/24/2013    Tinnitus of both ears 11/19/2013       Past Surgical History:   Procedure Laterality Date    [facility]  2015    stent x1    COLON SURGERY  1994    COLONOSCOPY  07/19/2005    EYE SURGERY  2009    left corneal transplant    EYE SURGERY  2008    right corneal transplant    EYE SURGERY      bilateral cataract    [facility] PAIN BLOCK Left 12/14/2018    Synvisc ONE LEFT knee performed by Mindy Biggs MD at [address] [facility] ([facility])  1969    hysterectomy    KYPHOSIS SURGERY  2017    L-1 compression fracture    MIDDLE EAR SURGERY  2005    right tympnaoplasty by dr gipson    TYMPANOPLASTY Right 05/03/2013       Allergies   Allergen Reactions    Codeine     Diazepam     Lisinopril     Simvastatin      Muscle aches    Valtrex [Valacyclovir Hcl]     Sulfa Antibiotics Rash       Social History     Tobacco Use    Smoking status: Never    Smokeless tobacco: Never   Substance Use Topics    Alcohol use: No       Family History   Problem Relation Age of Onset    Arthritis Mother     Heart Disease Father     Heart Disease Sister     Heart Disease Brother     Heart Disease Sister     Heart Disease Sister        I have reviewed the patient's past medical history, past surgical history, allergies, medications, social and family history and I have made updates where appropriate. Review of Systems  Positive responses are highlighted in bold    Constitutional:  Fever, Chills, Night Sweats, Fatigue, Unexpected changes in weight  HENT:  Ear pain, Tinnitus, Nosebleeds, Trouble swallowing, Hearing loss, Sore throat  Cardiovascular:  Chest Pain, Palpitations, Orthopnea, Paroxysmal Nocturnal Dyspnea  Respiratory:  Cough, Wheezing, Shortness of breath, Chest tightness, Apnea  Gastrointestinal:  Nausea, Vomiting, Diarrhea, Constipation, Heartburn, Blood in stool  Genitourinary:  Difficulty or painful urination, Flank pain, Change in frequency, Urgency  Skin:  Color change, Rash, Itching, Wound  Musculoskeletal:  Joint pain, Back pain, Gait problems, Joint swelling, Myalgias  Neurological:  Dizziness, Headaches, Presyncope, Numbness, Seizures, Tremors  Endocrine:  Heat Intolerance, Cold Intolerance, Polydipsia, Polyphagia, Polyuria      PHYSICAL EXAM:  Vitals:    10/05/22 1340   BP: 132/66   Pulse: 63   Resp: 12   Temp: 97.5 °F (36.4 °C)   SpO2: 97%   Weight: 134 lb (60.8 kg)   Height: 4' 9\" (1.448 m)     Body mass index is 29 kg/m².        VS Reviewed  General Appearance: A&O to person only, No acute distress,well developed and well- nourished  Eyes: pupils equal, round, and reactive to light, extraocular eye movements intact, conjunctivae and eye lids without erythema  ENT: external ear and ear canal clear bilaterally, TMs intact and regular, nose without deformity, nasal mucosa and turbinates normal without polyps, oropharynx normal, dentition is normal for age  Neck: supple and non-tender without mass, no thyromegaly or thyroid nodules, no cervical lymphadenopathy  Pulmonary/Chest: clear to auscultation bilaterally- no wheezes, rales or rhonchi, normal air movement, no respiratory distress or retractions  Cardiovascular: S1 and S2 auscultated w/ RRR. No murmurs, rubs, clicks, or gallops, distal pulses intact. Abdomen: soft, non-tender, non-distended, bowel sounds physiologic,  no rebound or guarding, no masses or hernias noted. Liver and spleen without enlargement. Extremities: no cyanosis, clubbing or edema of the lower extremities. +2 PT/DP bilaterally. Musculoskeletal: No joint swelling or gross deformity   Psych: Affect appropriate. Mood normal. Thought process is normal without evidence of depression or psychosis. Good insight and appropriate interaction. Cognition and memory appear to be impaored. Skin: warm and dry, no rash or erythema   Rectal: small hemorrhoid at 3 o'clock. Non-tender. Mild irritation. No blood      ASSESSMENT & PLAN  1. Grade I hemorrhoids    Small hemorrhoid  Con't senna  Add miralax  F/u in 2 wks if no better, sooner any changes    2. Constipation, unspecified constipation type    - polyethylene glycol (GLYCOLAX) 17 GM/SCOOP powder; Take 17 g by mouth daily  Dispense: 1530 g; Refill: 1    3. Iron deficiency anemia, unspecified iron deficiency anemia type    Improving  Con't iron  Discussed again with pt and daughter further w/u  I agree that based on her age, fraility, that a w/u wouldn't yield much helpful information  They would not intervene on anything anyway  They are aware we could be missing a cancer etc and are accepting  Con't iron  Repeat iron labs in NOV    4. B12 deficiency    Stable  Con't b12      DISPOSITION    Return if symptoms worsen or fail to improve. Reyes Yu released without restrictions. Future Appointments   Date Time Provider Bj Ahuja   2/7/2023  2:00 PM Ismael Ronquillo DO [facility] [address] [facility]     PATIENT COUNSELING    Barriers to learning and self management: [place], daughter present. Discussed use, benefit, and side effects of prescribed medications. Barriers to medication compliance addressed. All patient questions answered. Pt voiced understanding.        Electronically signed by Pam Hernandez DO on 10/5/2022 at 2:03 PM

## 2022-10-04 NOTE — TELEPHONE ENCOUNTER
I'd need to see it 1st to get an idea on what it is  We have then have to refer on to GI etc depending the issue

## 2022-10-04 NOTE — TELEPHONE ENCOUNTER
Daughter informed and verbalized understanding.    Future Appointments   Date Time Provider Bj Ahuja   10/5/2022  1:40 PM Lindy Alejandra DO [facility] - [address] [facility]   2/7/2023  2:00 PM Lindy Alejandra DO [address] [place] [address]

## 2022-10-04 NOTE — TELEPHONE ENCOUNTER
----- Message from Trevon Mathew sent at 10/4/2022  9:16 AM EDT -----  Subject: Message to Provider    QUESTIONS  Information for Provider? Pt's daughter Vika Beatty stated that she is in a   assisted living facility and received a call that the pt has a growth that   is bleeding or an outside hemorrhoid near her rectum and the nurse told   her that she isn't sure what it is. She would like to know if Dr. Daryl Whyte can treat it or if he can can refer to someone who can, pls   give her a call.  ---------------------------------------------------------------------------  --------------  [id number]  [phone]; OK to leave message on voicemail  ---------------------------------------------------------------------------  --------------  SCRIPT ANSWERS  Relationship to Patient? Other  Representative Name? Vika Beatty  Is the Representative on the appropriate HIPAA document in Epic?  Yes

## 2022-10-05 ENCOUNTER — OFFICE VISIT (OUTPATIENT)
Dept: FAMILY MEDICINE CLINIC | Age: 87
End: 2022-10-05
Payer: MEDICARE

## 2022-10-05 VITALS
HEIGHT: 57 IN | BODY MASS INDEX: 28.91 KG/M2 | OXYGEN SATURATION: 97 % | TEMPERATURE: 97.5 F | RESPIRATION RATE: 12 BRPM | WEIGHT: 134 LBS | HEART RATE: 63 BPM | SYSTOLIC BLOOD PRESSURE: 132 MMHG | DIASTOLIC BLOOD PRESSURE: 66 MMHG

## 2022-10-05 DIAGNOSIS — K59.00 CONSTIPATION, UNSPECIFIED CONSTIPATION TYPE: ICD-10-CM

## 2022-10-05 DIAGNOSIS — K64.0 GRADE I HEMORRHOIDS: Primary | ICD-10-CM

## 2022-10-05 DIAGNOSIS — E53.8 B12 DEFICIENCY: ICD-10-CM

## 2022-10-05 DIAGNOSIS — D50.9 IRON DEFICIENCY ANEMIA, UNSPECIFIED IRON DEFICIENCY ANEMIA TYPE: ICD-10-CM

## 2022-10-05 PROCEDURE — 1036F TOBACCO NON-USER: CPT | Performed by: FAMILY MEDICINE

## 2022-10-05 PROCEDURE — G8417 CALC BMI ABV UP PARAM F/U: HCPCS | Performed by: FAMILY MEDICINE

## 2022-10-05 PROCEDURE — 1090F PRES/ABSN URINE INCON ASSESS: CPT | Performed by: FAMILY MEDICINE

## 2022-10-05 PROCEDURE — G8427 DOCREV CUR MEDS BY ELIG CLIN: HCPCS | Performed by: FAMILY MEDICINE

## 2022-10-05 PROCEDURE — G8484 FLU IMMUNIZE NO ADMIN: HCPCS | Performed by: FAMILY MEDICINE

## 2022-10-05 PROCEDURE — 1123F ACP DISCUSS/DSCN MKR DOCD: CPT | Performed by: FAMILY MEDICINE

## 2022-10-05 PROCEDURE — 99213 OFFICE O/P EST LOW 20 MIN: CPT | Performed by: FAMILY MEDICINE

## 2022-10-05 RX ORDER — POLYETHYLENE GLYCOL 3350 17 G/17G
17 POWDER, FOR SOLUTION ORAL DAILY
Qty: 1530 G | Refills: 1 | Status: SHIPPED | OUTPATIENT
Start: 2022-10-05

## 2022-10-05 RX ORDER — ATORVASTATIN CALCIUM 10 MG/1
TABLET, FILM COATED ORAL
Qty: 90 TABLET | Refills: 0 | OUTPATIENT
Start: 2022-10-05

## 2022-10-24 RX ORDER — CLOPIDOGREL BISULFATE 75 MG/1
TABLET ORAL
Qty: 90 TABLET | Refills: 0 | OUTPATIENT
Start: 2022-10-24

## 2022-10-24 RX ORDER — ATORVASTATIN CALCIUM 10 MG/1
TABLET, FILM COATED ORAL
Qty: 90 TABLET | Refills: 0 | OUTPATIENT
Start: 2022-10-24

## 2022-10-31 RX ORDER — CLOPIDOGREL BISULFATE 75 MG/1
TABLET ORAL
Qty: 90 TABLET | Refills: 3 | Status: SHIPPED | OUTPATIENT
Start: 2022-10-31

## 2022-10-31 RX ORDER — ATORVASTATIN CALCIUM 10 MG/1
TABLET, FILM COATED ORAL
Qty: 90 TABLET | Refills: 3 | Status: SHIPPED | OUTPATIENT
Start: 2022-10-31

## 2022-11-11 ENCOUNTER — APPOINTMENT (OUTPATIENT)
Dept: GENERAL RADIOLOGY | Age: 87
DRG: 178 | End: 2022-11-11
Payer: MEDICARE

## 2022-11-11 ENCOUNTER — HOSPITAL ENCOUNTER (INPATIENT)
Age: 87
LOS: 1 days | Discharge: NURSING, REHAB OR LONG-TERM CARE | DRG: 178 | End: 2022-11-15
Attending: EMERGENCY MEDICINE | Admitting: FAMILY MEDICINE
Payer: MEDICARE

## 2022-11-11 ENCOUNTER — TELEPHONE (OUTPATIENT)
Dept: FAMILY MEDICINE CLINIC | Age: 87
End: 2022-11-11

## 2022-11-11 DIAGNOSIS — N39.0 URINARY TRACT INFECTION WITHOUT HEMATURIA, SITE UNSPECIFIED: ICD-10-CM

## 2022-11-11 DIAGNOSIS — R62.7 FAILURE TO THRIVE IN ADULT: ICD-10-CM

## 2022-11-11 DIAGNOSIS — U07.1 COVID-19: Primary | ICD-10-CM

## 2022-11-11 PROBLEM — R53.81 DEBILITY: Status: ACTIVE | Noted: 2022-11-11

## 2022-11-11 LAB
ALBUMIN SERPL-MCNC: 4.1 G/DL (ref 3.5–5.1)
ALP BLD-CCNC: 94 U/L (ref 38–126)
ALT SERPL-CCNC: 13 U/L (ref 11–66)
ANION GAP SERPL CALCULATED.3IONS-SCNC: 12 MEQ/L (ref 8–16)
AST SERPL-CCNC: 24 U/L (ref 5–40)
BACTERIA: ABNORMAL /HPF
BASOPHILS # BLD: 0.5 %
BASOPHILS ABSOLUTE: 0 THOU/MM3 (ref 0–0.1)
BILIRUB SERPL-MCNC: 0.4 MG/DL (ref 0.3–1.2)
BILIRUBIN DIRECT: < 0.2 MG/DL (ref 0–0.3)
BILIRUBIN URINE: NEGATIVE
BLOOD, URINE: NEGATIVE
BUN BLDV-MCNC: 10 MG/DL (ref 7–22)
C-REACTIVE PROTEIN: 0.93 MG/DL (ref 0–1)
CALCIUM SERPL-MCNC: 8.8 MG/DL (ref 8.5–10.5)
CASTS 2: ABNORMAL /LPF
CASTS UA: ABNORMAL /LPF
CHARACTER, URINE: ABNORMAL
CHLORIDE BLD-SCNC: 93 MEQ/L (ref 98–111)
CO2: 26 MEQ/L (ref 23–33)
COLOR: YELLOW
CREAT SERPL-MCNC: 0.4 MG/DL (ref 0.4–1.2)
CRYSTALS, UA: ABNORMAL
EOSINOPHIL # BLD: 0.2 %
EOSINOPHILS ABSOLUTE: 0 THOU/MM3 (ref 0–0.4)
EPITHELIAL CELLS, UA: ABNORMAL /HPF
ERYTHROCYTE [DISTWIDTH] IN BLOOD BY AUTOMATED COUNT: 15.1 % (ref 11.5–14.5)
ERYTHROCYTE [DISTWIDTH] IN BLOOD BY AUTOMATED COUNT: 51.7 FL (ref 35–45)
FERRITIN: 65 NG/ML (ref 10–291)
GFR SERPL CREATININE-BSD FRML MDRD: > 60 ML/MIN/1.73M2
GLUCOSE BLD-MCNC: 137 MG/DL (ref 70–108)
GLUCOSE URINE: 100 MG/DL
HCT VFR BLD CALC: 38.4 % (ref 37–47)
HEMOGLOBIN: 12.6 GM/DL (ref 12–16)
IMMATURE GRANS (ABS): 0.02 THOU/MM3 (ref 0–0.07)
IMMATURE GRANULOCYTES: 0.3 %
INFLUENZA A: NOT DETECTED
INFLUENZA B: NOT DETECTED
KETONES, URINE: ABNORMAL
LEUKOCYTE ESTERASE, URINE: ABNORMAL
LIPASE: 22.9 U/L (ref 5.6–51.3)
LYMPHOCYTES # BLD: 8 %
LYMPHOCYTES ABSOLUTE: 0.5 THOU/MM3 (ref 1–4.8)
MCH RBC QN AUTO: 30.8 PG (ref 26–33)
MCHC RBC AUTO-ENTMCNC: 32.8 GM/DL (ref 32.2–35.5)
MCV RBC AUTO: 93.9 FL (ref 81–99)
MISCELLANEOUS 2: ABNORMAL
MONOCYTES # BLD: 18.3 %
MONOCYTES ABSOLUTE: 1 THOU/MM3 (ref 0.4–1.3)
NITRITE, URINE: NEGATIVE
NUCLEATED RED BLOOD CELLS: 0 /100 WBC
OSMOLALITY CALCULATION: 263.8 MOSMOL/KG (ref 275–300)
OSMOLALITY: 281 MOSMOL/KG (ref 275–295)
PH UA: 7.5 (ref 5–9)
PLATELET # BLD: 206 THOU/MM3 (ref 130–400)
PMV BLD AUTO: 9.7 FL (ref 9.4–12.4)
POTASSIUM SERPL-SCNC: 3.8 MEQ/L (ref 3.5–5.2)
PRO-BNP: 1045 PG/ML (ref 0–1800)
PROCALCITONIN: 0.04 NG/ML (ref 0.01–0.09)
PROTEIN UA: NEGATIVE
RBC # BLD: 4.09 MILL/MM3 (ref 4.2–5.4)
RBC URINE: ABNORMAL /HPF
RENAL EPITHELIAL, UA: ABNORMAL
SARS-COV-2 RNA, RT PCR: DETECTED
SEDIMENTATION RATE, ERYTHROCYTE: 12 MM/HR (ref 0–20)
SEG NEUTROPHILS: 72.7 %
SEGMENTED NEUTROPHILS ABSOLUTE COUNT: 4.1 THOU/MM3 (ref 1.8–7.7)
SODIUM BLD-SCNC: 131 MEQ/L (ref 135–145)
SPECIFIC GRAVITY, URINE: 1.02 (ref 1–1.03)
TOTAL PROTEIN: 6.5 G/DL (ref 6.1–8)
UROBILINOGEN, URINE: 0.2 EU/DL (ref 0–1)
WBC # BLD: 5.7 THOU/MM3 (ref 4.8–10.8)
WBC UA: ABNORMAL /HPF
YEAST: ABNORMAL

## 2022-11-11 PROCEDURE — 80053 COMPREHEN METABOLIC PANEL: CPT

## 2022-11-11 PROCEDURE — G0378 HOSPITAL OBSERVATION PER HR: HCPCS

## 2022-11-11 PROCEDURE — 86140 C-REACTIVE PROTEIN: CPT

## 2022-11-11 PROCEDURE — 83930 ASSAY OF BLOOD OSMOLALITY: CPT

## 2022-11-11 PROCEDURE — 87636 SARSCOV2 & INF A&B AMP PRB: CPT

## 2022-11-11 PROCEDURE — 99219 PR INITIAL OBSERVATION CARE/DAY 50 MINUTES: CPT | Performed by: STUDENT IN AN ORGANIZED HEALTH CARE EDUCATION/TRAINING PROGRAM

## 2022-11-11 PROCEDURE — 82728 ASSAY OF FERRITIN: CPT

## 2022-11-11 PROCEDURE — 99285 EMERGENCY DEPT VISIT HI MDM: CPT

## 2022-11-11 PROCEDURE — 82248 BILIRUBIN DIRECT: CPT

## 2022-11-11 PROCEDURE — 84145 PROCALCITONIN (PCT): CPT

## 2022-11-11 PROCEDURE — 71045 X-RAY EXAM CHEST 1 VIEW: CPT

## 2022-11-11 PROCEDURE — 81001 URINALYSIS AUTO W/SCOPE: CPT

## 2022-11-11 PROCEDURE — 83880 ASSAY OF NATRIURETIC PEPTIDE: CPT

## 2022-11-11 PROCEDURE — 83690 ASSAY OF LIPASE: CPT

## 2022-11-11 PROCEDURE — 85651 RBC SED RATE NONAUTOMATED: CPT

## 2022-11-11 PROCEDURE — 36415 COLL VENOUS BLD VENIPUNCTURE: CPT

## 2022-11-11 PROCEDURE — 87086 URINE CULTURE/COLONY COUNT: CPT

## 2022-11-11 PROCEDURE — 85025 COMPLETE CBC W/AUTO DIFF WBC: CPT

## 2022-11-11 RX ORDER — LANOLIN ALCOHOL/MO/W.PET/CERES
1000 CREAM (GRAM) TOPICAL DAILY
Status: DISCONTINUED | OUTPATIENT
Start: 2022-11-12 | End: 2022-11-15 | Stop reason: HOSPADM

## 2022-11-11 RX ORDER — ACETAMINOPHEN 650 MG/1
650 SUPPOSITORY RECTAL EVERY 6 HOURS PRN
Status: DISCONTINUED | OUTPATIENT
Start: 2022-11-11 | End: 2022-11-15 | Stop reason: HOSPADM

## 2022-11-11 RX ORDER — ASPIRIN 81 MG/1
81 TABLET, CHEWABLE ORAL DAILY
Status: DISCONTINUED | OUTPATIENT
Start: 2022-11-12 | End: 2022-11-15 | Stop reason: HOSPADM

## 2022-11-11 RX ORDER — POLYETHYLENE GLYCOL 3350 17 G/17G
17 POWDER, FOR SOLUTION ORAL DAILY PRN
Status: DISCONTINUED | OUTPATIENT
Start: 2022-11-11 | End: 2022-11-15 | Stop reason: HOSPADM

## 2022-11-11 RX ORDER — METOPROLOL SUCCINATE 25 MG/1
37.5 TABLET, EXTENDED RELEASE ORAL NIGHTLY
Status: DISCONTINUED | OUTPATIENT
Start: 2022-11-12 | End: 2022-11-15 | Stop reason: HOSPADM

## 2022-11-11 RX ORDER — PANTOPRAZOLE SODIUM 40 MG/1
40 TABLET, DELAYED RELEASE ORAL
Status: DISCONTINUED | OUTPATIENT
Start: 2022-11-12 | End: 2022-11-15 | Stop reason: HOSPADM

## 2022-11-11 RX ORDER — MECLIZINE HCL 12.5 MG/1
12.5 TABLET ORAL 3 TIMES DAILY PRN
Status: DISCONTINUED | OUTPATIENT
Start: 2022-11-11 | End: 2022-11-15 | Stop reason: HOSPADM

## 2022-11-11 RX ORDER — LEVOFLOXACIN 500 MG/1
500 TABLET, FILM COATED ORAL ONCE
Status: DISCONTINUED | OUTPATIENT
Start: 2022-11-11 | End: 2022-11-11

## 2022-11-11 RX ORDER — ACETAMINOPHEN 325 MG/1
650 TABLET ORAL EVERY 6 HOURS PRN
Status: DISCONTINUED | OUTPATIENT
Start: 2022-11-11 | End: 2022-11-15 | Stop reason: HOSPADM

## 2022-11-11 RX ORDER — ATORVASTATIN CALCIUM 10 MG/1
10 TABLET, FILM COATED ORAL NIGHTLY
Status: DISCONTINUED | OUTPATIENT
Start: 2022-11-12 | End: 2022-11-15 | Stop reason: HOSPADM

## 2022-11-11 RX ORDER — SODIUM CHLORIDE 0.9 % (FLUSH) 0.9 %
5-40 SYRINGE (ML) INJECTION PRN
Status: DISCONTINUED | OUTPATIENT
Start: 2022-11-11 | End: 2022-11-15 | Stop reason: HOSPADM

## 2022-11-11 RX ORDER — FAMOTIDINE 20 MG/1
40 TABLET, FILM COATED ORAL DAILY
Status: DISCONTINUED | OUTPATIENT
Start: 2022-11-12 | End: 2022-11-15 | Stop reason: HOSPADM

## 2022-11-11 RX ORDER — MIRTAZAPINE 7.5 MG/1
7.5 TABLET, FILM COATED ORAL NIGHTLY
Status: DISCONTINUED | OUTPATIENT
Start: 2022-11-12 | End: 2022-11-15 | Stop reason: HOSPADM

## 2022-11-11 RX ORDER — SODIUM CHLORIDE 0.9 % (FLUSH) 0.9 %
5-40 SYRINGE (ML) INJECTION EVERY 12 HOURS SCHEDULED
Status: DISCONTINUED | OUTPATIENT
Start: 2022-11-11 | End: 2022-11-15 | Stop reason: HOSPADM

## 2022-11-11 RX ORDER — SENNA AND DOCUSATE SODIUM 50; 8.6 MG/1; MG/1
1 TABLET, FILM COATED ORAL DAILY
Status: DISCONTINUED | OUTPATIENT
Start: 2022-11-12 | End: 2022-11-15 | Stop reason: HOSPADM

## 2022-11-11 RX ORDER — METOPROLOL SUCCINATE 25 MG/1
12.5 TABLET, EXTENDED RELEASE ORAL DAILY
Status: DISCONTINUED | OUTPATIENT
Start: 2022-11-12 | End: 2022-11-15 | Stop reason: HOSPADM

## 2022-11-11 RX ORDER — AMLODIPINE BESYLATE 5 MG/1
5 TABLET ORAL DAILY
Status: DISCONTINUED | OUTPATIENT
Start: 2022-11-12 | End: 2022-11-15 | Stop reason: HOSPADM

## 2022-11-11 RX ORDER — FERROUS SULFATE 325(65) MG
325 TABLET ORAL 2 TIMES DAILY WITH MEALS
Status: DISCONTINUED | OUTPATIENT
Start: 2022-11-12 | End: 2022-11-15 | Stop reason: HOSPADM

## 2022-11-11 RX ORDER — ONDANSETRON 4 MG/1
4 TABLET, ORALLY DISINTEGRATING ORAL EVERY 8 HOURS PRN
Status: DISCONTINUED | OUTPATIENT
Start: 2022-11-11 | End: 2022-11-15 | Stop reason: HOSPADM

## 2022-11-11 RX ORDER — SODIUM CHLORIDE 9 MG/ML
INJECTION, SOLUTION INTRAVENOUS PRN
Status: DISCONTINUED | OUTPATIENT
Start: 2022-11-11 | End: 2022-11-15 | Stop reason: HOSPADM

## 2022-11-11 RX ORDER — TIMOLOL MALEATE 5 MG/ML
1 SOLUTION/ DROPS OPHTHALMIC 2 TIMES DAILY
Status: DISCONTINUED | OUTPATIENT
Start: 2022-11-12 | End: 2022-11-15 | Stop reason: HOSPADM

## 2022-11-11 RX ORDER — VALSARTAN 320 MG/1
320 TABLET ORAL DAILY
Status: DISCONTINUED | OUTPATIENT
Start: 2022-11-12 | End: 2022-11-15 | Stop reason: HOSPADM

## 2022-11-11 RX ORDER — CLOPIDOGREL BISULFATE 75 MG/1
75 TABLET ORAL DAILY
Status: DISCONTINUED | OUTPATIENT
Start: 2022-11-12 | End: 2022-11-15 | Stop reason: HOSPADM

## 2022-11-11 RX ORDER — GABAPENTIN 400 MG/1
400 CAPSULE ORAL 3 TIMES DAILY
Status: DISCONTINUED | OUTPATIENT
Start: 2022-11-12 | End: 2022-11-15 | Stop reason: HOSPADM

## 2022-11-11 RX ORDER — ONDANSETRON 2 MG/ML
4 INJECTION INTRAMUSCULAR; INTRAVENOUS EVERY 6 HOURS PRN
Status: DISCONTINUED | OUTPATIENT
Start: 2022-11-11 | End: 2022-11-15 | Stop reason: HOSPADM

## 2022-11-11 RX ORDER — ENOXAPARIN SODIUM 100 MG/ML
40 INJECTION SUBCUTANEOUS DAILY
Status: DISCONTINUED | OUTPATIENT
Start: 2022-11-12 | End: 2022-11-15 | Stop reason: HOSPADM

## 2022-11-11 NOTE — ED TRIAGE NOTES
Patient from [facility] assisted living where she resides with her . She has been ill for about one week and today developed a fever. Patient does not have any complaints. She does have history of dementia and daughter states patient is not able to verbalize her complaints well.

## 2022-11-11 NOTE — ED PROVIDER NOTES
[address] [facility] [address] [address] EMERGENCY DEPT  [address] [facility] [address]  Phone: [phone]      CHIEF COMPLAINT       Chief Complaint   Patient presents with    Fever       Nurses Notes reviewed and I agree except as noted in the HPI. HISTORY OF PRESENT ILLNESS    Elizabeth Harper is a 80 y.o. female. This patient lives in an assisted living center. She is listed as a DNR CC. She does have underlying dementia. The  is there with her. She presents today with a febrile illness. Temperature has been over 101. She is not able to describe symptoms well but they thought possibly urine. REVIEW OF SYSTEMS         Review of systems is not obtainable due to dementia. PAST MEDICAL HISTORY    has a past medical history of ASHD (arteriosclerotic heart disease), Constipation, DDD (degenerative disc disease), lumbar, Dyslipidemia, Fibromyalgia, GERD (gastroesophageal reflux disease), Glaucoma, History of vertebral compression fracture; L1, Hypertension, essential, Nausea & vomiting, Osteoporosis, S/P PTCA: 2/2/2015: Stenting mid-LAD using Xience Alpine 2.75 X 28 mm, post-dilated to 3.0 mm distally and 3.18 mm proximally. , Status post tympanoplasty, and Tinnitus of both ears. SURGICAL HISTORY      has a past surgical history that includes Colon surgery (1994); Colonoscopy (07/19/2005); Middle ear surgery (2005); Appendectomy (1940); Hysterectomy (1969); Tympanoplasty (Right, 05/03/2013); Cardiac catheterization (2015); eye surgery (2009); eye surgery (2008); eye surgery; Kyphosis surgery (2017); and Pain Block (Left, 12/14/2018). CURRENT MEDICATIONS       Previous Medications    ACETAMINOPHEN (TYLENOL) 500 MG TABLET    Take 500 mg by mouth every 6 hours as needed for Pain    AMLODIPINE (NORVASC) 5 MG TABLET    Take 1 tablet by mouth once daily    ASPIRIN 81 MG CHEWABLE TABLET    Take 1 tablet by mouth daily.     ATORVASTATIN (LIPITOR) 10 MG TABLET    Take 1 tablet by mouth once daily    CALCIUM CITRATE-VITAMIN D (CALCIUM CITRATE + PO)    Take 600 mg by mouth 2 times daily    CARBAMIDE PEROXIDE (DEBROX) 6.5 % OTIC SOLUTION    5 drops as needed    CHOLECALCIFEROL (VITAMIN D3) 125 MCG (5000 UT) TABS    Take 1 tablet by mouth daily    CLOPIDOGREL (PLAVIX) 75 MG TABLET    Take 1 tablet by mouth once daily    CYANOCOBALAMIN (CVS VITAMIN B12) 1000 MCG TABLET    Take 1 tablet by mouth daily    FAMOTIDINE (PEPCID) 40 MG TABLET    Take 40 mg by mouth daily    FERROUS SULFATE (IRON 325) 325 (65 FE) MG TABLET    Take 1 tablet by mouth in the morning and 1 tablet in the evening. Take with meals. FLAXSEED, LINSEED, (FLAX SEED OIL) 1000 MG CAPS    Take 1 capsule by mouth 2 times daily. GABAPENTIN (NEURONTIN) 400 MG CAPSULE    TAKE 1 CAPSULE BY MOUTH THREE TIMES DAILY    LIDOCAINE-PRILOCAINE (EMLA) 2.5-2.5 % CREAM    Apply topically as needed for Pain Apply topically as needed. MECLIZINE (ANTIVERT) 12.5 MG TABLET    Take 1 tablet by mouth 3 times daily as needed for Dizziness    METOPROLOL SUCCINATE (TOPROL XL) 25 MG EXTENDED RELEASE TABLET    TAKE  1/2 ( ONE-HALF) TABLETS BY MOUTH ONCE DAILY IN THE MORNING AND THEN TAKE 1 & 1/2 (ONE & ONE-HALF) TABS ONCE DAILY AT BEDTIME    MIRTAZAPINE (REMERON) 15 MG TABLET    TAKE 1/2 (ONE-HALF) TABLET BY MOUTH NIGHTLY    MULTIPLE VITAMINS-MINERALS (OCUVITE) TABS ORAL TABLET    Take 1 tablet by mouth daily.       NITROGLYCERIN (NITROSTAT) 0.4 MG SL TABLET    Place 1 tablet under the tongue every 5 minutes as needed for Chest pain    ONDANSETRON (ZOFRAN) 4 MG TABLET    Take 4 mg by mouth every 8 hours as needed for Nausea or Vomiting    PANTOPRAZOLE (PROTONIX) 40 MG TABLET    Take 1 tab daily    POLYETHYLENE GLYCOL (GLYCOLAX) 17 GM/SCOOP POWDER    Take 17 g by mouth daily    SENNA CO    by Combination route    SENNOSIDES-DOCUSATE SODIUM (SENOKOT-S) 8.6-50 MG TABLET    Take 1 tablet by mouth daily    TIMOLOL (TIMOPTIC) 0.5 % OPHTHALMIC SOLUTION    1 drop 2 times daily VALSARTAN (DIOVAN) 320 MG TABLET    Take 1 tablet by mouth once daily       ALLERGIES     is allergic to codeine, diazepam, lisinopril, simvastatin, valtrex [valacyclovir hcl], and sulfa antibiotics. FAMILY HISTORY     She indicated that her mother is . She indicated that her father is . She indicated that two of her four sisters are alive. She indicated that her brother is . family history includes Arthritis in her mother; Heart Disease in her brother, father, sister, sister, and sister. SOCIAL HISTORY      reports that she has never smoked. She has never used smokeless tobacco. She reports that she does not drink alcohol and does not use drugs. PHYSICAL EXAM     INITIAL VITALS:  oral temperature is 101.9 °F (38.8 °C) (abnormal). Her blood pressure is 124/56 (abnormal) and her pulse is 70. Her respiration is 16 and oxygen saturation is 93%. Constitutional: Somewhat chronically ill-appearing  Eyes:  Pupils are equal and reactive  HENT:  Atraumatic appearing  oropharynx moist, no pharyngeal exudates. Neck- normal range of motion, supple   Respiratory:  No wheezing, rhonchi or rales  Cardiovascular: regular  GI:  Non tender, no rigidity, rebound or guarding  Musculoskeletal:  2/4 distal pulses, no pitting edema  Integument: warm and dry  Neurologic: Patient is minimally verbal.  She lets the daughter do all of the talking for her. She is grossly oriented to person. Grossly moves all 4 extremities. DIAGNOSTIC RESULTS          RADIOLOGY: non-plain film images(s) such as CT, Ultrasound and MRI are read by the radiologist.  Chest x-ray was interpreted by the radiologist as cardiomegaly with mild pulmonary vascular congestion.     LABS:   Labs Reviewed   COVID-19 & INFLUENZA COMBO - Abnormal; Notable for the following components:       Result Value    SARS-CoV-2 RNA, RT PCR DETECTED (*)     All other components within normal limits   CBC WITH AUTO DIFFERENTIAL - Abnormal; Notable for the following components:    RBC 4.09 (*)     RDW-CV 15.1 (*)     RDW-SD 51.7 (*)     Lymphocytes Absolute 0.5 (*)     All other components within normal limits   BASIC METABOLIC PANEL - Abnormal; Notable for the following components:    Sodium 131 (*)     Chloride 93 (*)     Glucose 137 (*)     All other components within normal limits   URINE WITH REFLEXED MICRO - Abnormal; Notable for the following components:    Glucose, Ur 100 (*)     Ketones, Urine TRACE (*)     Leukocyte Esterase, Urine MODERATE (*)     Character, Urine CLOUDY (*)     All other components within normal limits   OSMOLALITY - Abnormal; Notable for the following components:    Osmolality Calc 263.8 (*)     All other components within normal limits   CULTURE, REFLEXED, URINE   HEPATIC FUNCTION PANEL   LIPASE   GLOMERULAR FILTRATION RATE, ESTIMATED   ANION GAP   C-REACTIVE PROTEIN   FERRITIN   SEDIMENTATION RATE   PROCALCITONIN   URINALYSIS WITH MICROSCOPIC   OSMOLALITY, SERUM       EMERGENCY DEPARTMENT COURSE:   Vitals:    Vitals:    11/11/22 1630 11/11/22 1730 11/11/22 1745 11/11/22 1845   BP: 131/60 (!) 128/54 (!) 125/54 (!) 124/56   Pulse:    70   Resp:  16     Temp:       TempSrc:       SpO2: 92% 93% 91% 93%     Patient presents the emergency department with a febrile illness, very limited ability as historian due to underlying dementia. Lives in assisted living where she is not really doing well enough to continue at this point in current state. She has tested positive for COVID and has leukocytes in her urine as well so I given her a dose of antibiotics. I asked nursing to contact the assisted living facility to see if they could handle her in her current state and its looking like we would be better off bringing her in. So have contacted the hospitalist to arrange for admission. CRITICAL CARE:   none         FINAL IMPRESSION      1. COVID-19    2. Urinary tract infection without hematuria, site unspecified    3. Failure to thrive in adult          DISPOSITION/PLAN   Admission    DISCHARGE MEDICATIONS:  New Prescriptions    No medications on file       (Please note that portions of this note were completed with a voice recognition program.  Efforts were made to edit the dictations but occasionally words are mis-transcribed.)    Jose Castro, [address] [place] [address] [place],   11/11/22 1921

## 2022-11-11 NOTE — TELEPHONE ENCOUNTER
Ambler Buster, nurse at [facility] called stating that patient is not feeling well, head hurts\" doesn't feel right\" nauseated, feels like she is going to fall, but not dizzy. Was tested for Covid and flu, both negative. BP was 144/68 prior to meds and pulse 87. Vane Stein states she called [facility] Paoli patient's daughter and she requested they called our office to ask what you think they should do.  ( Make an appointment, go to Urgent care, ED)

## 2022-11-11 NOTE — TELEPHONE ENCOUNTER
I'm out of the office today at the nursing home. If symptoms are mild, we could monitor this weekend and I could see her Monday     If symptoms more severe, could either do walk-in here or UC today. Let me know if questions, thanks!

## 2022-11-11 NOTE — ED NOTES
Per daughter, patient is unable to get around like she normally does. Patient is in assisted living and they state they cannot take her back if she cannot take care of herself. Patient unable to complete ADLs and will need admission.      Reji Block RN  11/11/22 0980

## 2022-11-12 LAB
BACTERIA: ABNORMAL
BILIRUBIN URINE: NEGATIVE
BLOOD, URINE: NEGATIVE
CASTS: ABNORMAL /LPF
CASTS: ABNORMAL /LPF
CHARACTER, URINE: CLEAR
COLOR: YELLOW
CRYSTALS: ABNORMAL
EKG ATRIAL RATE: 77 BPM
EKG P AXIS: 3 DEGREES
EKG P-R INTERVAL: 192 MS
EKG Q-T INTERVAL: 330 MS
EKG QRS DURATION: 58 MS
EKG QTC CALCULATION (BAZETT): 373 MS
EKG R AXIS: 27 DEGREES
EKG T AXIS: 32 DEGREES
EKG VENTRICULAR RATE: 77 BPM
EPITHELIAL CELLS, UA: ABNORMAL /HPF
GLUCOSE, URINE: NEGATIVE MG/DL
KETONES, URINE: 15
LEUKOCYTE ESTERASE, URINE: ABNORMAL
MISCELLANEOUS LAB TEST RESULT: ABNORMAL
NITRITE, URINE: NEGATIVE
PH UA: 7 (ref 5–9)
PROTEIN UA: ABNORMAL MG/DL
RBC URINE: ABNORMAL /HPF
RENAL EPITHELIAL, UA: ABNORMAL
SPECIFIC GRAVITY UA: 1.02 (ref 1–1.03)
UROBILINOGEN, URINE: 0.2 EU/DL (ref 0–1)
WBC UA: ABNORMAL /HPF
YEAST: ABNORMAL

## 2022-11-12 PROCEDURE — 6370000000 HC RX 637 (ALT 250 FOR IP): Performed by: NURSE PRACTITIONER

## 2022-11-12 PROCEDURE — G0378 HOSPITAL OBSERVATION PER HR: HCPCS

## 2022-11-12 PROCEDURE — 96375 TX/PRO/DX INJ NEW DRUG ADDON: CPT

## 2022-11-12 PROCEDURE — 6370000000 HC RX 637 (ALT 250 FOR IP): Performed by: STUDENT IN AN ORGANIZED HEALTH CARE EDUCATION/TRAINING PROGRAM

## 2022-11-12 PROCEDURE — 96372 THER/PROPH/DIAG INJ SC/IM: CPT

## 2022-11-12 PROCEDURE — 99225 PR SBSQ OBSERVATION CARE/DAY 25 MINUTES: CPT | Performed by: NURSE PRACTITIONER

## 2022-11-12 PROCEDURE — 2580000003 HC RX 258: Performed by: STUDENT IN AN ORGANIZED HEALTH CARE EDUCATION/TRAINING PROGRAM

## 2022-11-12 PROCEDURE — 2500000003 HC RX 250 WO HCPCS: Performed by: STUDENT IN AN ORGANIZED HEALTH CARE EDUCATION/TRAINING PROGRAM

## 2022-11-12 PROCEDURE — 6360000002 HC RX W HCPCS: Performed by: STUDENT IN AN ORGANIZED HEALTH CARE EDUCATION/TRAINING PROGRAM

## 2022-11-12 PROCEDURE — 93010 ELECTROCARDIOGRAM REPORT: CPT | Performed by: NUCLEAR MEDICINE

## 2022-11-12 PROCEDURE — 93005 ELECTROCARDIOGRAM TRACING: CPT | Performed by: STUDENT IN AN ORGANIZED HEALTH CARE EDUCATION/TRAINING PROGRAM

## 2022-11-12 PROCEDURE — 81001 URINALYSIS AUTO W/SCOPE: CPT

## 2022-11-12 PROCEDURE — 96374 THER/PROPH/DIAG INJ IV PUSH: CPT

## 2022-11-12 PROCEDURE — 6360000002 HC RX W HCPCS: Performed by: NURSE PRACTITIONER

## 2022-11-12 RX ORDER — ZINC SULFATE 50(220)MG
50 CAPSULE ORAL DAILY
Status: DISCONTINUED | OUTPATIENT
Start: 2022-11-12 | End: 2022-11-15 | Stop reason: HOSPADM

## 2022-11-12 RX ORDER — VITAMIN B COMPLEX
1000 TABLET ORAL DAILY
Status: DISCONTINUED | OUTPATIENT
Start: 2022-11-12 | End: 2022-11-15 | Stop reason: HOSPADM

## 2022-11-12 RX ORDER — ASCORBIC ACID 500 MG
1000 TABLET ORAL DAILY
Status: DISCONTINUED | OUTPATIENT
Start: 2022-11-12 | End: 2022-11-15 | Stop reason: HOSPADM

## 2022-11-12 RX ORDER — HYDRALAZINE HYDROCHLORIDE 20 MG/ML
10 INJECTION INTRAMUSCULAR; INTRAVENOUS EVERY 8 HOURS PRN
Status: DISCONTINUED | OUTPATIENT
Start: 2022-11-12 | End: 2022-11-15 | Stop reason: HOSPADM

## 2022-11-12 RX ORDER — SODIUM CHLORIDE, SODIUM LACTATE, POTASSIUM CHLORIDE, CALCIUM CHLORIDE 600; 310; 30; 20 MG/100ML; MG/100ML; MG/100ML; MG/100ML
INJECTION, SOLUTION INTRAVENOUS CONTINUOUS
Status: ACTIVE | OUTPATIENT
Start: 2022-11-12 | End: 2022-11-12

## 2022-11-12 RX ADMIN — MICONAZOLE NITRATE: 20 POWDER TOPICAL at 09:29

## 2022-11-12 RX ADMIN — Medication 50 MG: at 15:07

## 2022-11-12 RX ADMIN — PANTOPRAZOLE SODIUM 40 MG: 40 TABLET, DELAYED RELEASE ORAL at 07:03

## 2022-11-12 RX ADMIN — Medication 1000 MCG: at 08:37

## 2022-11-12 RX ADMIN — FERROUS SULFATE TAB 325 MG (65 MG ELEMENTAL FE) 325 MG: 325 (65 FE) TAB at 17:29

## 2022-11-12 RX ADMIN — CLOPIDOGREL BISULFATE 75 MG: 75 TABLET ORAL at 08:37

## 2022-11-12 RX ADMIN — FERROUS SULFATE TAB 325 MG (65 MG ELEMENTAL FE) 325 MG: 325 (65 FE) TAB at 08:38

## 2022-11-12 RX ADMIN — METOPROLOL SUCCINATE 37.5 MG: 25 TABLET, FILM COATED, EXTENDED RELEASE ORAL at 22:19

## 2022-11-12 RX ADMIN — Medication 1 TABLET: at 22:20

## 2022-11-12 RX ADMIN — MIRTAZAPINE 7.5 MG: 7.5 TABLET ORAL at 00:53

## 2022-11-12 RX ADMIN — ASPIRIN 81 MG 81 MG: 81 TABLET ORAL at 08:38

## 2022-11-12 RX ADMIN — Medication 1 TABLET: at 00:55

## 2022-11-12 RX ADMIN — GABAPENTIN 400 MG: 400 CAPSULE ORAL at 15:07

## 2022-11-12 RX ADMIN — VALSARTAN 320 MG: 320 TABLET, FILM COATED ORAL at 08:37

## 2022-11-12 RX ADMIN — ATORVASTATIN CALCIUM 10 MG: 10 TABLET, FILM COATED ORAL at 22:19

## 2022-11-12 RX ADMIN — GABAPENTIN 400 MG: 400 CAPSULE ORAL at 00:54

## 2022-11-12 RX ADMIN — SODIUM CHLORIDE, PRESERVATIVE FREE 10 ML: 5 INJECTION INTRAVENOUS at 01:07

## 2022-11-12 RX ADMIN — OXYCODONE HYDROCHLORIDE AND ACETAMINOPHEN 1000 MG: 500 TABLET ORAL at 15:07

## 2022-11-12 RX ADMIN — TIMOLOL MALEATE 1 DROP: 5 SOLUTION OPHTHALMIC at 00:49

## 2022-11-12 RX ADMIN — TIMOLOL MALEATE 1 DROP: 5 SOLUTION OPHTHALMIC at 22:19

## 2022-11-12 RX ADMIN — METOPROLOL SUCCINATE 12.5 MG: 25 TABLET, FILM COATED, EXTENDED RELEASE ORAL at 08:37

## 2022-11-12 RX ADMIN — MIRTAZAPINE 7.5 MG: 7.5 TABLET ORAL at 22:20

## 2022-11-12 RX ADMIN — AMLODIPINE BESYLATE 5 MG: 5 TABLET ORAL at 08:38

## 2022-11-12 RX ADMIN — HYDRALAZINE HYDROCHLORIDE 10 MG: 20 INJECTION INTRAMUSCULAR; INTRAVENOUS at 17:29

## 2022-11-12 RX ADMIN — MICONAZOLE NITRATE: 20 POWDER TOPICAL at 22:36

## 2022-11-12 RX ADMIN — SENNOSIDES AND DOCUSATE SODIUM 1 TABLET: 50; 8.6 TABLET ORAL at 08:43

## 2022-11-12 RX ADMIN — ONDANSETRON 4 MG: 2 INJECTION INTRAMUSCULAR; INTRAVENOUS at 16:56

## 2022-11-12 RX ADMIN — Medication 1000 UNITS: at 15:07

## 2022-11-12 RX ADMIN — METOPROLOL SUCCINATE 37.5 MG: 25 TABLET, FILM COATED, EXTENDED RELEASE ORAL at 00:56

## 2022-11-12 RX ADMIN — ATORVASTATIN CALCIUM 10 MG: 10 TABLET, FILM COATED ORAL at 00:55

## 2022-11-12 RX ADMIN — SODIUM CHLORIDE, POTASSIUM CHLORIDE, SODIUM LACTATE AND CALCIUM CHLORIDE: 600; 310; 30; 20 INJECTION, SOLUTION INTRAVENOUS at 04:52

## 2022-11-12 RX ADMIN — Medication 1 TABLET: at 08:38

## 2022-11-12 RX ADMIN — FAMOTIDINE 40 MG: 20 TABLET ORAL at 08:38

## 2022-11-12 RX ADMIN — GABAPENTIN 400 MG: 400 CAPSULE ORAL at 08:38

## 2022-11-12 RX ADMIN — GABAPENTIN 400 MG: 400 CAPSULE ORAL at 22:19

## 2022-11-12 RX ADMIN — ENOXAPARIN SODIUM 40 MG: 100 INJECTION SUBCUTANEOUS at 08:38

## 2022-11-12 RX ADMIN — SODIUM CHLORIDE, PRESERVATIVE FREE 10 ML: 5 INJECTION INTRAVENOUS at 22:20

## 2022-11-12 RX ADMIN — TIMOLOL MALEATE 1 DROP: 5 SOLUTION OPHTHALMIC at 08:43

## 2022-11-12 NOTE — ED NOTES
ED to inpatient nurses report    Chief Complaint   Patient presents with    Fever      Present to ED from SNF  LOC: alert and orientated to name, place, date  Vital signs   Vitals:    11/11/22 1745 11/11/22 1845 11/11/22 1940 11/11/22 2017   BP: (!) 125/54 (!) 124/56 (!) 122/51    Pulse:  70 72    Resp:   17    Temp:    98.3 °F (36.8 °C)   TempSrc:    Oral   SpO2: 91% 93% 93%       Oxygen Baseline Room Air    Current needs required None Bipap/Cpap No  LDAs:   Peripheral IV 11/11/22 Left Forearm (Active)   Site Assessment Clean, dry & intact 11/11/22 1940   Line Status Normal saline locked 11/11/22 1940   Phlebitis Assessment No symptoms 11/11/22 1940   Infiltration Assessment 0 11/11/22 1940   Dressing Status Clean, dry & intact 11/11/22 1940     Mobility: Requires assistance * 1  Pending ED orders: None  Present condition: Stable    C-SSRS    Swallow Screening    Preferred Language: [place]     Electronically signed by Dotty Boss RN on 11/11/2022 at 8:18 PM     Dotty Boss RN  11/11/22 2018

## 2022-11-12 NOTE — ED NOTES
ED nurse-to-nurse bedside report    Chief Complaint   Patient presents with    Fever      LOC: alert and orientated to name, place, date  Vital signs   Vitals:    11/11/22 1630 11/11/22 1730 11/11/22 1745 11/11/22 1845   BP: 131/60 (!) 128/54 (!) 125/54 (!) 124/56   Pulse:    70   Resp:  16     Temp:       TempSrc:       SpO2: 92% 93% 91% 93%      Pain:    Pain Interventions: n/a  Pain Goal: n/a  Oxygen: No    Current needs required ra   Telemetry: Yes  LDAs:   Peripheral IV 11/11/22 Left Forearm (Active)   Site Assessment Clean, dry & intact 11/11/22 1621     Continuous Infusions:   Mobility: Requires assistance * 2  Hills Fall Risk Score:    Fall Risk 8/8/2022 8/3/2021 1/28/2021   2 or more falls in past year? no no no   Fall with injury in past year? no no no     Fall Interventions: side rails, call light, bed alarm  Report given to: Justyna Medina RN  11/11/22 1911

## 2022-11-12 NOTE — ED NOTES
Hospitalist at bedside assessing pt. Pt and family updated on plan of care. Call light in reach.      Melani Kennedy RN  11/11/22 2015

## 2022-11-12 NOTE — H&P
Hospitalist - History & Physical      Patient: Ash Ray    Unit/Bed:24/024A  YOB: 1928  MRN: [id number]   Acct: [de-identified]   PCP: Adali Toscano DO    Date of Service: Pt seen/examined on 11/11/22  and Admitted to Observation with expected LOS less than two midnights due to medical therapy. Chief Complaint:  Fatigue    Assessment and Plan:-  Debility in setting of COVID-19 infection: on RA, saturating well. CXR negative. Supportive care. On continuous pulse ox. PT/OT for safe discharge placement. CAD/HLD/HTN: continue home amlodipine, metoprolol and valsartan with hold parameters. Continue home aspirin, atorvastatin and clopidogrel  Mild hyponatremia: secondary to poor PO intake, trial of IVF, will monitor BMP  Perineal Dermatitis: likely diaper rash. Zinc oxide cream and miconazole powder. GERD: continue home famotidine and PPI  Iron deficiency anemia: continue home iron supplementation  Depression: continue home remeron  Glaucoma: continue home eye drops  Vitamin D/B12 deficiency: continue home vitamin D and B12 supplementation. Constipation: continue home bowel regimen. History Of Present Illness:    Ms. Ash Ray is a 80 y.o. female who presented with complain of fatigue and not feeling well since last couple of days. Patient has dementia and is not a good historian. Per daughter, she is at baseline for her mental status. She knew her name, knew she is in hospital, could not answer year or name of the hospital or name of president. She did not know why she is here at the hospital.    Patient told daughter that she does not feel right and head feels goofy. She did have fever today of 102. She has dry cough. No pain. She also felt lightheaded this morning. Otherwise no other complains. She was found to be COVID positive at ED. She was saturating well on RA. CXR was negative, showed mild vascular congestion.  Hospitalist team was contacted as per ED physician, [facility] informed him that they would not be able to take care of the patient as she has been less active and not getting up or taking care of herself. Thus, she was admitted for safe discharge placement. Past Medical History:        Diagnosis Date    ASHD (arteriosclerotic heart disease)     Constipation     DDD (degenerative disc disease), lumbar     Dyslipidemia     Fibromyalgia     GERD (gastroesophageal reflux disease)     Glaucoma     History of vertebral compression fracture; L1     Hypertension, essential     Nausea & vomiting     Osteoporosis     S/P PTCA: 2/2/2015: Stenting mid-LAD using Xience Alpine 2.75 X 28 mm, post-dilated to 3.0 mm distally and 3.18 mm proximally. 02/02/2015 2/2/2015: Stenting mid-LAD using Xience Alpine 2.75 X 28 mm, post-dilated to 3.0 mm distally and 3.18 mm proximally. Dr. Jack File    Status post tympanoplasty 6/24/2013    Tinnitus of both ears 11/19/2013       Past Surgical History:        Procedure Laterality Date    [facility]  2015    stent x1    COLON SURGERY  1994    COLONOSCOPY  07/19/2005    EYE SURGERY  2009    left corneal transplant    EYE SURGERY  2008    right corneal transplant    EYE SURGERY      bilateral cataract    [facility] PAIN BLOCK Left 12/14/2018    Synvisc ONE LEFT knee performed by Aimee Manley MD at [address] [place] [address] ([facility])  1969    hysterectomy    KYPHOSIS SURGERY  2017    L-1 compression fracture    MIDDLE EAR SURGERY  2005    right tympnaoplasty by dr gipson    TYMPANOPLASTY Right 05/03/2013       Home Medications:   No current facility-administered medications on file prior to encounter.      Current Outpatient Medications on File Prior to Encounter   Medication Sig Dispense Refill    clopidogrel (PLAVIX) 75 MG tablet Take 1 tablet by mouth once daily 90 tablet 3    atorvastatin (LIPITOR) 10 MG tablet Take 1 tablet by mouth once daily 90 tablet 3    polyethylene glycol (GLYCOLAX) 17 GM/SCOOP powder Take 17 g by mouth daily 1530 g 1    ferrous sulfate (IRON 325) 325 (65 Fe) MG tablet Take 1 tablet by mouth in the morning and 1 tablet in the evening. Take with meals. 180 tablet 1    metoprolol succinate (TOPROL XL) 25 MG extended release tablet TAKE  1/2 ( ONE-HALF) TABLETS BY MOUTH ONCE DAILY IN THE MORNING AND THEN TAKE 1 & 1/2 (ONE & ONE-HALF) TABS ONCE DAILY AT BEDTIME 180 tablet 3    gabapentin (NEURONTIN) 400 MG capsule TAKE 1 CAPSULE BY MOUTH THREE TIMES DAILY 90 capsule 11    pantoprazole (PROTONIX) 40 MG tablet Take 1 tab daily 90 tablet 3    valsartan (DIOVAN) 320 MG tablet Take 1 tablet by mouth once daily 90 tablet 3    amLODIPine (NORVASC) 5 MG tablet Take 1 tablet by mouth once daily 90 tablet 3    mirtazapine (REMERON) 15 MG tablet TAKE 1/2 (ONE-HALF) TABLET BY MOUTH NIGHTLY 45 tablet 3    meclizine (ANTIVERT) 12.5 MG tablet Take 1 tablet by mouth 3 times daily as needed for Dizziness 60 tablet 1    cyanocobalamin (CVS VITAMIN B12) 1000 MCG tablet Take 1 tablet by mouth daily 90 tablet 1    SENNA CO by Combination route      carbamide peroxide (DEBROX) 6.5 % otic solution 5 drops as needed      lidocaine-prilocaine (EMLA) 2.5-2.5 % cream Apply topically as needed for Pain Apply topically as needed.       acetaminophen (TYLENOL) 500 MG tablet Take 500 mg by mouth every 6 hours as needed for Pain      timolol (TIMOPTIC) 0.5 % ophthalmic solution 1 drop 2 times daily      famotidine (PEPCID) 40 MG tablet Take 40 mg by mouth daily      sennosides-docusate sodium (SENOKOT-S) 8.6-50 MG tablet Take 1 tablet by mouth daily      Cholecalciferol (VITAMIN D3) 125 MCG (5000 UT) TABS Take 1 tablet by mouth daily      ondansetron (ZOFRAN) 4 MG tablet Take 4 mg by mouth every 8 hours as needed for Nausea or Vomiting      nitroGLYCERIN (NITROSTAT) 0.4 MG SL tablet Place 1 tablet under the tongue every 5 minutes as needed for Chest pain 25 tablet 3    Calcium Citrate-Vitamin D (CALCIUM CITRATE + PO) Take 600 mg by mouth 2 times daily      aspirin 81 MG chewable tablet Take 1 tablet by mouth daily. 30 tablet 3    Multiple Vitamins-Minerals (OCUVITE) TABS oral tablet Take 1 tablet by mouth daily. Flaxseed, Linseed, (FLAX SEED OIL) 1000 MG CAPS Take 1 capsule by mouth 2 times daily. Allergies:    Codeine, Diazepam, Lisinopril, Simvastatin, Valtrex [valacyclovir hcl], and Sulfa antibiotics    Social History:    reports that she has never smoked. She has never used smokeless tobacco. She reports that she does not drink alcohol and does not use drugs. Family History:       Problem Relation Age of Onset    Arthritis Mother     Heart Disease Father     Heart Disease Sister     Heart Disease Brother     Heart Disease Sister     Heart Disease Sister        Diet:  No diet orders on file    Review of systems:   Unable to perform, patient answered no to all ROS questions, but she has baseline dementia. PHYSICAL EXAM:  BP (!) 124/56   Pulse 70   Temp (!) 101.9 °F (38.8 °C) (Oral)   Resp 16   SpO2 93%   General appearance: No apparent distress, appears stated age and cooperative. HEENT: Normal cephalic, atraumatic without obvious deformity. Pupils equal, round, and reactive to light. Extra ocular muscles intact. Conjunctivae/corneas clear. Neck: Supple, with full range of motion. No jugular venous distention. Trachea midline. Respiratory:  Normal respiratory effort. Clear to auscultation, bilaterally without Rales/Wheezes/Rhonchi. Cardiovascular: Regular rate and rhythm with normal S1/S2 without murmurs, rubs or gallops. Abdomen: Soft, non-tender, non-distended with normal bowel sounds. Musculoskeletal:  No clubbing or cyanosis. Skin/: Erythema noted at perineal region without discharge. Neurologic:  Neurovascularly intact without any focal sensory/motor deficits. Cranial nerves: II-XII intact, grossly non-focal. Generalized weakness. Psychiatric: Alert and oriented.  She knew her name, knew she is in hospital, could not answer year or name of the hospital or name of president. She did not know why she is here at the hospital.    Labs:   Recent Labs     11/11/22  1615   WBC 5.7   HGB 12.6   HCT 38.4        Recent Labs     11/11/22  1615   *   K 3.8   CL 93*   CO2 26   BUN 10   CREATININE 0.4   CALCIUM 8.8     Recent Labs     11/11/22  1615   AST 24   ALT 13   BILIDIR <0.2   BILITOT 0.4   ALKPHOS 94     No results for input(s): INR in the last 72 hours. No results for input(s): [place] Memory in the last 72 hours. Urinalysis:    Lab Results   Component Value Date/Time    NITRU NEGATIVE 11/11/2022 04:15 PM    WBCUA 15-25 11/11/2022 04:15 PM    BACTERIA NONE SEEN 11/11/2022 04:15 PM    RBCUA 0-2 11/11/2022 04:15 PM    BLOODU NEGATIVE 11/11/2022 04:15 PM    GLUCOSEU 100 11/11/2022 04:15 PM       Radiology:   XR CHEST PORTABLE   Final Result   1. No consolidation. 2. Cardiomegaly with mild pulmonary vascular congestion. This document has been electronically signed by: Carmen Schaefer MD on    11/11/2022 06:11 PM        XR CHEST PORTABLE    Result Date: 11/11/2022  Single view of the chest. COMPARISON: Single view of the chest dated 2/24/2016 FINDINGS: Cardiomegaly. Atherosclerotic thoracic aorta. Prominence of the central pulmonary vasculature. No consolidation. No definite pleural effusion. No pneumothorax. Vertebral augmentation cement present within the lower thoracic vertebral bodies. [place] right curvature of the lower thoracic spine. No acute fracture identified. 1. No consolidation. 2. Cardiomegaly with mild pulmonary vascular congestion. This document has been electronically signed by: Carmen Schaefer MD on 11/11/2022 06:11 PM        EKG: NSR.      Electronically signed by Jabari Wei MD on 11/11/2022 at 7:54 PM

## 2022-11-12 NOTE — ED NOTES
Pt and daughter updated on plan of care. Voiced no needs. Call light in reach.      Stephan Langford RN  11/11/22 2043

## 2022-11-12 NOTE — PROGRESS NOTES
Hospitalist Progress Note    Patient: Geno Clarke      Unit/Bed:5K-20/020-A    YOB: 1928    MRN: [id number]       Acct: [de-identified]     PCP: Ismael Ronquillo DO    Date of Admission: 11/11/2022    Assessment/Plan:    COVID-19 infection:  11/11 COVID positive. 11/11 CXR shows mild pulmonary congestion. On RA, saturating well. Febrile on admission 101.9. Tylenol PRN. Continue droplet precautions. Add Vitamin D, Vitamin C, Zinc supplements for immune support. Weakness due to physical deconditioning:  Likely due to #1. PT/OT ordered. Awaiting recommendations, may require SNF placement. Hyponatremia, mild, chronic:  Noted on chart review. 11/11 Na 131. Continue IVF. Repeat BMP in AM.     Severe dementia: Mentation at baseline. Perineal Dermatitis. Likely diaper rash. Zinc oxide cream and miconazole powder. CAD:   Continue ASA, clopidogrel. On Lovenox for DVT prophylaxis. Essential HTN:  Follows with Dr. Ranjit Cuba as OP. Continue amlodipine, metoprolol succinate, valsartan with hold parameters. HLD:  Continue atorvastatin. Iron deficiency anemia with B12 deficiency:  Continue ferrous sulfate, cyanocobalamin. Constipation:  Continue Senokot-S. Fibromyalgia:  Continue gabapentin. Glaucoma:  Continue timolol. Depression:  Continue mirtazapine. Advanced age. Code status verified as DNR CC. Conservative management. GERD:  Continue famotidine and pantoprazole. Chief Complaint: fatigue    Hospital Course: Patient is a 80 y.o. female who presented with complain of fatigue and not feeling well since last couple of days. Patient has dementia and is not a good historian. Per daughter, she is at baseline for her mental status. She knew her name, knew she is in hospital, could not answer year or name of the hospital or name of president.  She did not know why she is here at the hospital.  Patient told daughter that she does not feel right and head feels goofy. She did have fever today of 102. She has dry cough. No pain. She also felt lightheaded this morning. Otherwise no other complains. She was found to be COVID positive at ED. She was saturating well on RA. CXR was negative, showed mild vascular congestion. Hospitalist team was contacted as per ED physician, MICHELE informed him that they would not be able to take care of the patient as she has been less active and not getting up or taking care of herself. Thus, she was admitted for safe discharge placement. Subjective: Sitting up in bed. Complains of feeling weak. Denies any pain. Denies headache, dizziness, or lightheadedness. Denies chest pain or SOB. Denies abdominal pain or nausea. Denies dysuria or hematuria. Medications:  Reviewed    Infusion Medications    lactated ringers 75 mL/hr at 11/12/22 0452    sodium chloride       Scheduled Medications    miconazole   Topical BID    amLODIPine  5 mg Oral Daily    aspirin  81 mg Oral Daily    atorvastatin  10 mg Oral Nightly    calcium-cholecalciferol  1 tablet Oral BID    clopidogrel  75 mg Oral Daily    cyanocobalamin  1,000 mcg Oral Daily    famotidine  40 mg Oral Daily    ferrous sulfate  325 mg Oral BID WC    gabapentin  400 mg Oral TID    metoprolol succinate  12.5 mg Oral Daily    metoprolol succinate  37.5 mg Oral Nightly    mirtazapine  7.5 mg Oral Nightly    pantoprazole  40 mg Oral QAM AC    sennosides-docusate sodium  1 tablet Oral Daily    timolol  1 drop Both Eyes BID    valsartan  320 mg Oral Daily    sodium chloride flush  5-40 mL IntraVENous 2 times per day    enoxaparin  40 mg SubCUTAneous Daily     PRN Meds: zinc oxide, meclizine, sodium chloride flush, sodium chloride, ondansetron **OR** ondansetron, polyethylene glycol, acetaminophen **OR** acetaminophen    No intake or output data in the 24 hours ending 11/12/22 1229    Diet:  ADULT DIET;  Regular    Exam:  BP (!) 166/75   Pulse 72   Temp 99.3 °F (37.4 °C) (Oral)   Resp 18   Ht 4' 9\" (1.448 m)   Wt 138 lb 7.2 oz (62.8 kg)   SpO2 94%   BMI 29.96 kg/m²     General appearance: No apparent distress, appears stated age and cooperative. HEENT: Pupils equal, round, and reactive to light. Conjunctivae/corneas clear. Neck: Supple, with full range of motion. No jugular venous distention. Trachea midline. Respiratory:  Normal respiratory effort. Clear to auscultation, bilaterally without Rales/Wheezes/Rhonchi. Cardiovascular: Regular rate and rhythm with normal S1/S2 without murmurs, rubs or gallops. Abdomen: Soft, non-tender, non-distended with normal bowel sounds. Musculoskeletal: passive and active ROM x 4 extremities. Skin: Skin color, texture, turgor normal.  No rashes or lesions. Neurologic:  Neurovascularly intact without any focal sensory/motor deficits. Psychiatric: Alert and pleasant. Oriented to name only. Capillary Refill: Brisk,< 3 seconds   Peripheral Pulses: +2 palpable, equal bilaterally       Labs:   Recent Labs     11/11/22  1615   WBC 5.7   HGB 12.6   HCT 38.4        Recent Labs     11/11/22  1615   *   K 3.8   CL 93*   CO2 26   BUN 10   CREATININE 0.4   CALCIUM 8.8     Recent Labs     11/11/22  1615   AST 24   ALT 13   BILIDIR <0.2   BILITOT 0.4   ALKPHOS 94     No results for input(s): INR in the last 72 hours. No results for input(s): Cleatrice Hargrove in the last 72 hours. Urinalysis:      Lab Results   Component Value Date/Time    NITRU NEGATIVE 11/12/2022 04:45 AM    WBCUA 25-50 11/12/2022 04:45 AM    BACTERIA NONE SEEN 11/12/2022 04:45 AM    RBCUA 3-5 11/12/2022 04:45 AM    BLOODU NEGATIVE 11/12/2022 04:45 AM    SPECGRAV 1.019 11/12/2022 04:45 AM    GLUCOSEU 100 11/11/2022 04:15 PM       Radiology:  XR CHEST PORTABLE   Final Result   1. No consolidation. 2. Cardiomegaly with mild pulmonary vascular congestion. This document has been electronically signed by:  Jorje Irving MD on    11/11/2022 06:11 PM          Diet: ADULT DIET; Regular    DVT prophylaxis: [x] Lovenox                                 [] SCDs                                 [] SQ Heparin                                 [] Encourage ambulation           [] Already on Anticoagulation     Disposition:    [] Home       [] TCU       [] Rehab       [] Psych       [x] SNF       [] [facility]       [] Other-    Code Status: DNR-CC    PT/OT Eval Status: ordered, awaiting recommendations.          Electronically signed by Corrine DOCKERY, RN, AG-ANCP Student on 11/12/2022 at 12:29 PM

## 2022-11-13 LAB
ANION GAP SERPL CALCULATED.3IONS-SCNC: 13 MEQ/L (ref 8–16)
BASOPHILS # BLD: 0.2 %
BASOPHILS ABSOLUTE: 0 THOU/MM3 (ref 0–0.1)
BUN BLDV-MCNC: 12 MG/DL (ref 7–22)
CALCIUM SERPL-MCNC: 9.2 MG/DL (ref 8.5–10.5)
CHLORIDE BLD-SCNC: 95 MEQ/L (ref 98–111)
CO2: 24 MEQ/L (ref 23–33)
CREAT SERPL-MCNC: 0.4 MG/DL (ref 0.4–1.2)
EOSINOPHIL # BLD: 0 %
EOSINOPHILS ABSOLUTE: 0 THOU/MM3 (ref 0–0.4)
ERYTHROCYTE [DISTWIDTH] IN BLOOD BY AUTOMATED COUNT: 15.2 % (ref 11.5–14.5)
ERYTHROCYTE [DISTWIDTH] IN BLOOD BY AUTOMATED COUNT: 53.1 FL (ref 35–45)
GFR SERPL CREATININE-BSD FRML MDRD: > 60 ML/MIN/1.73M2
GLUCOSE BLD-MCNC: 90 MG/DL (ref 70–108)
HCT VFR BLD CALC: 39.1 % (ref 37–47)
HEMOGLOBIN: 12.6 GM/DL (ref 12–16)
IMMATURE GRANS (ABS): 0.01 THOU/MM3 (ref 0–0.07)
IMMATURE GRANULOCYTES: 0.2 %
LYMPHOCYTES # BLD: 29.4 %
LYMPHOCYTES ABSOLUTE: 1.4 THOU/MM3 (ref 1–4.8)
MCH RBC QN AUTO: 31.1 PG (ref 26–33)
MCHC RBC AUTO-ENTMCNC: 32.2 GM/DL (ref 32.2–35.5)
MCV RBC AUTO: 96.5 FL (ref 81–99)
MONOCYTES # BLD: 20.8 %
MONOCYTES ABSOLUTE: 1 THOU/MM3 (ref 0.4–1.3)
NUCLEATED RED BLOOD CELLS: 0 /100 WBC
ORGANISM: ABNORMAL
PLATELET # BLD: 183 THOU/MM3 (ref 130–400)
PMV BLD AUTO: 10 FL (ref 9.4–12.4)
POTASSIUM SERPL-SCNC: 4.1 MEQ/L (ref 3.5–5.2)
RBC # BLD: 4.05 MILL/MM3 (ref 4.2–5.4)
SEG NEUTROPHILS: 49.4 %
SEGMENTED NEUTROPHILS ABSOLUTE COUNT: 2.3 THOU/MM3 (ref 1.8–7.7)
SODIUM BLD-SCNC: 132 MEQ/L (ref 135–145)
URINE CULTURE REFLEX: ABNORMAL
WBC # BLD: 4.7 THOU/MM3 (ref 4.8–10.8)

## 2022-11-13 PROCEDURE — 6360000002 HC RX W HCPCS: Performed by: STUDENT IN AN ORGANIZED HEALTH CARE EDUCATION/TRAINING PROGRAM

## 2022-11-13 PROCEDURE — 99225 PR SBSQ OBSERVATION CARE/DAY 25 MINUTES: CPT | Performed by: NURSE PRACTITIONER

## 2022-11-13 PROCEDURE — 96372 THER/PROPH/DIAG INJ SC/IM: CPT

## 2022-11-13 PROCEDURE — 6370000000 HC RX 637 (ALT 250 FOR IP): Performed by: NURSE PRACTITIONER

## 2022-11-13 PROCEDURE — 80048 BASIC METABOLIC PNL TOTAL CA: CPT

## 2022-11-13 PROCEDURE — G0378 HOSPITAL OBSERVATION PER HR: HCPCS

## 2022-11-13 PROCEDURE — 2580000003 HC RX 258: Performed by: STUDENT IN AN ORGANIZED HEALTH CARE EDUCATION/TRAINING PROGRAM

## 2022-11-13 PROCEDURE — 85025 COMPLETE CBC W/AUTO DIFF WBC: CPT

## 2022-11-13 PROCEDURE — 36415 COLL VENOUS BLD VENIPUNCTURE: CPT

## 2022-11-13 PROCEDURE — 6370000000 HC RX 637 (ALT 250 FOR IP): Performed by: STUDENT IN AN ORGANIZED HEALTH CARE EDUCATION/TRAINING PROGRAM

## 2022-11-13 PROCEDURE — 2500000003 HC RX 250 WO HCPCS: Performed by: STUDENT IN AN ORGANIZED HEALTH CARE EDUCATION/TRAINING PROGRAM

## 2022-11-13 RX ADMIN — ATORVASTATIN CALCIUM 10 MG: 10 TABLET, FILM COATED ORAL at 20:18

## 2022-11-13 RX ADMIN — OXYCODONE HYDROCHLORIDE AND ACETAMINOPHEN 1000 MG: 500 TABLET ORAL at 10:42

## 2022-11-13 RX ADMIN — FAMOTIDINE 40 MG: 20 TABLET ORAL at 10:39

## 2022-11-13 RX ADMIN — METOPROLOL SUCCINATE 12.5 MG: 25 TABLET, FILM COATED, EXTENDED RELEASE ORAL at 10:41

## 2022-11-13 RX ADMIN — SODIUM CHLORIDE, PRESERVATIVE FREE 10 ML: 5 INJECTION INTRAVENOUS at 10:44

## 2022-11-13 RX ADMIN — GABAPENTIN 400 MG: 400 CAPSULE ORAL at 14:23

## 2022-11-13 RX ADMIN — ASPIRIN 81 MG 81 MG: 81 TABLET ORAL at 10:39

## 2022-11-13 RX ADMIN — MICONAZOLE NITRATE: 20 POWDER TOPICAL at 20:20

## 2022-11-13 RX ADMIN — Medication 1000 UNITS: at 10:42

## 2022-11-13 RX ADMIN — GABAPENTIN 400 MG: 400 CAPSULE ORAL at 20:19

## 2022-11-13 RX ADMIN — Medication 1000 MCG: at 10:42

## 2022-11-13 RX ADMIN — GABAPENTIN 400 MG: 400 CAPSULE ORAL at 10:40

## 2022-11-13 RX ADMIN — FERROUS SULFATE TAB 325 MG (65 MG ELEMENTAL FE) 325 MG: 325 (65 FE) TAB at 10:42

## 2022-11-13 RX ADMIN — PANTOPRAZOLE SODIUM 40 MG: 40 TABLET, DELAYED RELEASE ORAL at 06:36

## 2022-11-13 RX ADMIN — SENNOSIDES AND DOCUSATE SODIUM 1 TABLET: 50; 8.6 TABLET ORAL at 10:39

## 2022-11-13 RX ADMIN — MICONAZOLE NITRATE: 20 POWDER TOPICAL at 10:43

## 2022-11-13 RX ADMIN — VALSARTAN 320 MG: 320 TABLET, FILM COATED ORAL at 10:42

## 2022-11-13 RX ADMIN — CLOPIDOGREL BISULFATE 75 MG: 75 TABLET ORAL at 10:39

## 2022-11-13 RX ADMIN — Medication 1 TABLET: at 20:19

## 2022-11-13 RX ADMIN — ENOXAPARIN SODIUM 40 MG: 100 INJECTION SUBCUTANEOUS at 10:39

## 2022-11-13 RX ADMIN — AMLODIPINE BESYLATE 5 MG: 5 TABLET ORAL at 10:42

## 2022-11-13 RX ADMIN — Medication 1 TABLET: at 10:39

## 2022-11-13 RX ADMIN — TIMOLOL MALEATE 1 DROP: 5 SOLUTION OPHTHALMIC at 10:39

## 2022-11-13 RX ADMIN — TIMOLOL MALEATE 1 DROP: 5 SOLUTION OPHTHALMIC at 20:19

## 2022-11-13 RX ADMIN — SODIUM CHLORIDE, PRESERVATIVE FREE 10 ML: 5 INJECTION INTRAVENOUS at 20:19

## 2022-11-13 RX ADMIN — Medication 50 MG: at 10:42

## 2022-11-13 RX ADMIN — MIRTAZAPINE 7.5 MG: 7.5 TABLET ORAL at 20:19

## 2022-11-13 RX ADMIN — METOPROLOL SUCCINATE 37.5 MG: 25 TABLET, FILM COATED, EXTENDED RELEASE ORAL at 20:18

## 2022-11-13 NOTE — PLAN OF CARE
Problem: Safety - Adult  Goal: Free from fall injury  Outcome: Progressing     Problem: Confusion  Goal: Confusion, delirium, dementia, or psychosis is improved or at baseline  Description: INTERVENTIONS:  1. Assess for possible contributors to thought disturbance, including medications, impaired vision or hearing, underlying metabolic abnormalities, dehydration, psychiatric diagnoses, and notify attending LIP  2. [place] high risk fall precautions, as indicated  3. Provide frequent short contacts to provide reality reorientation, refocusing and direction  4. Decrease environmental stimuli, including noise as appropriate  5. Monitor and intervene to maintain adequate nutrition, hydration, elimination, sleep and activity  6. If unable to ensure safety without constant attention obtain sitter and review sitter guidelines with assigned personnel  7. Initiate Psychosocial CNS and Spiritual Care consult, as indicated  Outcome: Progressing  Flowsheets (Taken 11/12/2022 2158)  Effect of thought disturbance (confusion, delirium, dementia, or psychosis) are managed with adequate functional status: [place] high risk fall precautions, as indicated   Patient is easily aroused, alert with decreased confusion as she can now identify she is in the hospital. She is in isolation with Covid and remains on room air. She is with low grade temps below 100 oral and with frequent bouts of cold, denies pain, no nausea and no loose stool. Patient is forgetful, impulsive and frequent urinary needs. Her U/A from past 24 hours is with no bacteria but ketones and glucose are in urine. She will remain with a telesitter and bed alarm due to her isolation requiring the door closed and safety.  Reorienting to person, place, time and equipment with each interaction and each staff member

## 2022-11-13 NOTE — PROGRESS NOTES
Hospitalist Progress Note    Patient: Phil Mesa      Unit/Bed:5K-20/020-A    YOB: 1928    MRN: [id number]       Acct: [de-identified]     PCP: Nicolas Weiss DO    Date of Admission: 11/11/2022    Assessment/Plan:    COVID-19 infection: 11/11 COVID positive. 11/11 CXR shows mild pulmonary congestion. On RA, saturating well. Febrile on admission 101.9. Tylenol PRN. Continue droplet precautions. Continue Vitamin D, Vitamin C, Zinc supplements for immune support. Weakness due to physical deconditioning: Likely due to #1. PT/OT ordered. Awaiting recommendations, may require SNF placement. Hyponatremia, mild, chronic: . S/p IVF. BMP in AM.    Severe dementia: Mentation at baseline. Perineal Dermatitis. Likely diaper rash. Zinc oxide cream and miconazole powder. CAD: Continue ASA, clopidogrel. On Lovenox for DVT prophylaxis. Essential HTN: Follows with Dr. Jannette Hooks as OP. Continue amlodipine, metoprolol succinate, valsartan with hold parameters. HLD: Continue atorvastatin. Iron deficiency anemia with B12 deficiency: Continue ferrous sulfate, cyanocobalamin. Constipation: Continue Senokot-S. Fibromyalgia: Continue gabapentin. Glaucoma: Continue timolol. Depression: Continue mirtazapine. Advanced age. Code status verified as DNR CC. Conservative management. GERD: Continue famotidine and pantoprazole. Chief Complaint: fatigue    Hospital Course: Patient is a 80 y.o. female who presented with complain of fatigue and not feeling well since last couple of days. Patient has dementia and is not a good historian. Per daughter, she is at baseline for her mental status. She knew her name, knew she is in hospital, could not answer year or name of the hospital or name of president. She did not know why she is here at the hospital.  Patient told daughter that she does not feel right and head feels goofy. She did have fever today of 102.  She has dry cough. No pain. She also felt lightheaded this morning. Otherwise no other complains. She was found to be COVID positive at ED. She was saturating well on RA. CXR was negative, showed mild vascular congestion. Hospitalist team was contacted as per ED physician, MICHELE informed him that they would not be able to take care of the patient as she has been less active and not getting up or taking care of herself. Thus, she was admitted for safe discharge placement. Subjective: Feels much better today. Son at bedside. No SOB/CP/Dizziness. Medications:  Reviewed    Infusion Medications    sodium chloride       Scheduled Medications    miconazole   Topical BID    Vitamin D  1,000 Units Oral Daily    zinc sulfate  50 mg Oral Daily    ascorbic acid  1,000 mg Oral Daily    amLODIPine  5 mg Oral Daily    aspirin  81 mg Oral Daily    atorvastatin  10 mg Oral Nightly    calcium-cholecalciferol  1 tablet Oral BID    clopidogrel  75 mg Oral Daily    cyanocobalamin  1,000 mcg Oral Daily    famotidine  40 mg Oral Daily    ferrous sulfate  325 mg Oral BID WC    gabapentin  400 mg Oral TID    metoprolol succinate  12.5 mg Oral Daily    metoprolol succinate  37.5 mg Oral Nightly    mirtazapine  7.5 mg Oral Nightly    pantoprazole  40 mg Oral QAM AC    sennosides-docusate sodium  1 tablet Oral Daily    timolol  1 drop Both Eyes BID    valsartan  320 mg Oral Daily    sodium chloride flush  5-40 mL IntraVENous 2 times per day    enoxaparin  40 mg SubCUTAneous Daily     PRN Meds: zinc oxide, hydrALAZINE, meclizine, sodium chloride flush, sodium chloride, ondansetron **OR** ondansetron, polyethylene glycol, acetaminophen **OR** acetaminophen      Intake/Output Summary (Last 24 hours) at 11/13/2022 1401  Last data filed at 11/13/2022 1032  Gross per 24 hour   Intake 580 ml   Output --   Net 580 ml       Diet:  ADULT DIET;  Regular    Exam:  BP (!) 158/71   Pulse 71   Temp 98.9 °F (37.2 °C) (Oral)   Resp 19   Ht 4' 9\" (1.448 m) Wt 138 lb 7.2 oz (62.8 kg)   SpO2 91%   BMI 29.96 kg/m²     General appearance: No apparent distress, appears stated age and cooperative. HEENT: Pupils equal, round, and reactive to light. Conjunctivae/corneas clear. Neck: Supple, with full range of motion. No jugular venous distention. Trachea midline. Respiratory:  Normal respiratory effort. Clear to auscultation, bilaterally without Rales/Wheezes/Rhonchi. Cardiovascular: Regular rate and rhythm with normal S1/S2 without murmurs, rubs or gallops. Abdomen: Soft, non-tender, non-distended with normal bowel sounds. Musculoskeletal: passive and active ROM x 4 extremities. Skin: Skin color, texture, turgor normal.  No rashes or lesions. Neurologic:  Neurovascularly intact without any focal sensory/motor deficits. Psychiatric: Alert and pleasant. Oriented to name only. Capillary Refill: Brisk,< 3 seconds   Peripheral Pulses: +2 palpable, equal bilaterally       Labs:   Recent Labs     11/11/22  1615 11/13/22  0502   WBC 5.7 4.7*   HGB 12.6 12.6   HCT 38.4 39.1    183       Recent Labs     11/11/22  1615 11/13/22  0502   * 132*   K 3.8 4.1   CL 93* 95*   CO2 26 24   BUN 10 12   CREATININE 0.4 0.4   CALCIUM 8.8 9.2       Recent Labs     11/11/22  1615   AST 24   ALT 13   BILIDIR <0.2   BILITOT 0.4   ALKPHOS 94       No results for input(s): INR in the last 72 hours. No results for input(s): Ivan Beets in the last 72 hours. Urinalysis:      Lab Results   Component Value Date/Time    NITRU NEGATIVE 11/12/2022 04:45 AM    WBCUA 25-50 11/12/2022 04:45 AM    BACTERIA NONE SEEN 11/12/2022 04:45 AM    RBCUA 3-5 11/12/2022 04:45 AM    BLOODU NEGATIVE 11/12/2022 04:45 AM    SPECGRAV 1.019 11/12/2022 04:45 AM    GLUCOSEU 100 11/11/2022 04:15 PM       Radiology:  XR CHEST PORTABLE   Final Result   1. No consolidation. 2. Cardiomegaly with mild pulmonary vascular congestion. This document has been electronically signed by:  Alvaro Toth MD on    11/11/2022 06:11 PM          Diet: ADULT DIET; Regular    DVT prophylaxis: [x] Lovenox                                 [] SCDs                                 [] SQ Heparin                                 [] Encourage ambulation           [] Already on Anticoagulation     Disposition:    [] Home       [] TCU       [] Rehab       [] Psych       [x] SNF       [] [facility]       [] Other-    Code Status: DNR-CC    PT/OT Eval Status: ordered, awaiting recommendations.          Electronically signed by Alem Scott APRN - CNP BSN, RN, AG-ANCP Student on 11/13/2022 at 2:01 PM

## 2022-11-14 PROBLEM — R54 ADVANCED AGE: Status: ACTIVE | Noted: 2022-11-14

## 2022-11-14 PROBLEM — U07.1 COVID-19: Status: ACTIVE | Noted: 2022-11-14

## 2022-11-14 LAB
ANION GAP SERPL CALCULATED.3IONS-SCNC: 10 MEQ/L (ref 8–16)
BUN BLDV-MCNC: 10 MG/DL (ref 7–22)
CALCIUM SERPL-MCNC: 9.3 MG/DL (ref 8.5–10.5)
CHLORIDE BLD-SCNC: 97 MEQ/L (ref 98–111)
CO2: 27 MEQ/L (ref 23–33)
CREAT SERPL-MCNC: 0.5 MG/DL (ref 0.4–1.2)
GFR SERPL CREATININE-BSD FRML MDRD: > 60 ML/MIN/1.73M2
GLUCOSE BLD-MCNC: 89 MG/DL (ref 70–108)
POTASSIUM SERPL-SCNC: 4.1 MEQ/L (ref 3.5–5.2)
SODIUM BLD-SCNC: 134 MEQ/L (ref 135–145)

## 2022-11-14 PROCEDURE — G0378 HOSPITAL OBSERVATION PER HR: HCPCS

## 2022-11-14 PROCEDURE — 97530 THERAPEUTIC ACTIVITIES: CPT

## 2022-11-14 PROCEDURE — 36415 COLL VENOUS BLD VENIPUNCTURE: CPT

## 2022-11-14 PROCEDURE — 96372 THER/PROPH/DIAG INJ SC/IM: CPT

## 2022-11-14 PROCEDURE — 6360000002 HC RX W HCPCS: Performed by: STUDENT IN AN ORGANIZED HEALTH CARE EDUCATION/TRAINING PROGRAM

## 2022-11-14 PROCEDURE — 80048 BASIC METABOLIC PNL TOTAL CA: CPT

## 2022-11-14 PROCEDURE — 97162 PT EVAL MOD COMPLEX 30 MIN: CPT

## 2022-11-14 PROCEDURE — 97535 SELF CARE MNGMENT TRAINING: CPT

## 2022-11-14 PROCEDURE — 6370000000 HC RX 637 (ALT 250 FOR IP): Performed by: NURSE PRACTITIONER

## 2022-11-14 PROCEDURE — 99224 PR SBSQ OBSERVATION CARE/DAY 15 MINUTES: CPT | Performed by: NURSE PRACTITIONER

## 2022-11-14 PROCEDURE — 97116 GAIT TRAINING THERAPY: CPT

## 2022-11-14 PROCEDURE — 97165 OT EVAL LOW COMPLEX 30 MIN: CPT

## 2022-11-14 PROCEDURE — 97166 OT EVAL MOD COMPLEX 45 MIN: CPT

## 2022-11-14 PROCEDURE — 6370000000 HC RX 637 (ALT 250 FOR IP): Performed by: STUDENT IN AN ORGANIZED HEALTH CARE EDUCATION/TRAINING PROGRAM

## 2022-11-14 PROCEDURE — 2580000003 HC RX 258: Performed by: STUDENT IN AN ORGANIZED HEALTH CARE EDUCATION/TRAINING PROGRAM

## 2022-11-14 RX ORDER — ZINC SULFATE 50(220)MG
50 CAPSULE ORAL DAILY
Qty: 30 CAPSULE | Refills: 3 | COMMUNITY
Start: 2022-11-15

## 2022-11-14 RX ADMIN — AMLODIPINE BESYLATE 5 MG: 5 TABLET ORAL at 10:40

## 2022-11-14 RX ADMIN — ENOXAPARIN SODIUM 40 MG: 100 INJECTION SUBCUTANEOUS at 10:30

## 2022-11-14 RX ADMIN — ASPIRIN 81 MG 81 MG: 81 TABLET ORAL at 10:32

## 2022-11-14 RX ADMIN — Medication 50 MG: at 10:39

## 2022-11-14 RX ADMIN — PANTOPRAZOLE SODIUM 40 MG: 40 TABLET, DELAYED RELEASE ORAL at 06:27

## 2022-11-14 RX ADMIN — SENNOSIDES AND DOCUSATE SODIUM 1 TABLET: 50; 8.6 TABLET ORAL at 10:32

## 2022-11-14 RX ADMIN — FERROUS SULFATE TAB 325 MG (65 MG ELEMENTAL FE) 325 MG: 325 (65 FE) TAB at 17:20

## 2022-11-14 RX ADMIN — METOPROLOL SUCCINATE 12.5 MG: 25 TABLET, FILM COATED, EXTENDED RELEASE ORAL at 10:37

## 2022-11-14 RX ADMIN — METOPROLOL SUCCINATE 37.5 MG: 25 TABLET, FILM COATED, EXTENDED RELEASE ORAL at 22:08

## 2022-11-14 RX ADMIN — VALSARTAN 320 MG: 320 TABLET, FILM COATED ORAL at 10:37

## 2022-11-14 RX ADMIN — Medication 1000 MCG: at 10:37

## 2022-11-14 RX ADMIN — Medication 1 TABLET: at 10:37

## 2022-11-14 RX ADMIN — SODIUM CHLORIDE, PRESERVATIVE FREE 10 ML: 5 INJECTION INTRAVENOUS at 09:30

## 2022-11-14 RX ADMIN — GABAPENTIN 400 MG: 400 CAPSULE ORAL at 22:11

## 2022-11-14 RX ADMIN — TIMOLOL MALEATE 1 DROP: 5 SOLUTION OPHTHALMIC at 10:38

## 2022-11-14 RX ADMIN — TIMOLOL MALEATE 1 DROP: 5 SOLUTION OPHTHALMIC at 22:10

## 2022-11-14 RX ADMIN — MICONAZOLE NITRATE: 20 POWDER TOPICAL at 22:10

## 2022-11-14 RX ADMIN — MICONAZOLE NITRATE: 20 POWDER TOPICAL at 10:38

## 2022-11-14 RX ADMIN — FERROUS SULFATE TAB 325 MG (65 MG ELEMENTAL FE) 325 MG: 325 (65 FE) TAB at 10:36

## 2022-11-14 RX ADMIN — OXYCODONE HYDROCHLORIDE AND ACETAMINOPHEN 1000 MG: 500 TABLET ORAL at 10:40

## 2022-11-14 RX ADMIN — MIRTAZAPINE 7.5 MG: 7.5 TABLET ORAL at 22:10

## 2022-11-14 RX ADMIN — Medication 1000 UNITS: at 10:36

## 2022-11-14 RX ADMIN — GABAPENTIN 400 MG: 400 CAPSULE ORAL at 10:36

## 2022-11-14 RX ADMIN — FAMOTIDINE 40 MG: 20 TABLET ORAL at 10:32

## 2022-11-14 RX ADMIN — CLOPIDOGREL BISULFATE 75 MG: 75 TABLET ORAL at 10:32

## 2022-11-14 RX ADMIN — ATORVASTATIN CALCIUM 10 MG: 10 TABLET, FILM COATED ORAL at 22:10

## 2022-11-14 RX ADMIN — POLYETHYLENE GLYCOL 3350 17 G: 17 POWDER, FOR SOLUTION ORAL at 17:19

## 2022-11-14 RX ADMIN — GABAPENTIN 400 MG: 400 CAPSULE ORAL at 15:22

## 2022-11-14 NOTE — CARE COORDINATION
11/14/22, 11:32 AM EST    DISCHARGE PLANNING EVALUATION    Left a message with Brody Mendoza at [facility] about [facility]. Spoke with Channing Osei who is [facility] waiver PILAR. Her number is [phone], updated her on patient's status. Update 2:06 PM- Spoke with Brody Mendoza at [facility], they are both in agreement that Philip Rai would benefit greatly from skilled rehab before returning to the Assisted Living. SW updated PILAR Fong and will speak to family.

## 2022-11-14 NOTE — PROGRESS NOTES
[facility] THERAPY MISSED TREATMENT NOTE  [facility] 5K      Date: 2022  Patient Name: Andrae Stack        MRN: [id number]    : 1928  (80 y.o.)    REASON FOR MISSED TREATMENT:  ST received new speech therapy order 22 per Dr. Charan Jesus. Upon chart review ST unable to determine clinical rational for speech therapy evaluation/services. Patient is currently DNR-CC, consuming regular diet with thin liquids, dx of dementia which is documented to be at baseline and has current plans for safe discharge. RASHAD Suazo reporting, \"no swallowing difficulty. \"  ST with personal follow up with SW to ensure there is a safe discharge plan; SW confirmed yes there is a safe discharge plan to skilled side of facility before potential return to [place]. General Jeffy OCHOA-CNP attending this date; confirming no need for speech therapy services at this time. ST to complete order. Please re-consult should ST services/clinical needs arise.      ELAINE He 23

## 2022-11-14 NOTE — PROGRESS NOTES
[facility]  INPATIENT OCCUPATIONAL THERAPY  [facility] ONC MED 5K  EVALUATION    Time:   Time In: 945  Time Out: 1025  Timed Code Treatment Minutes: 25 Minutes  Minutes: 40          Date: 2022  Patient Name: Carin Waldron,   Gender: female      MRN: [id number]  : 1928  (80 y.o.)  Referring Practitioner: ZAHIDA Wayne  Diagnosis: Debility  Additional Pertinent Hx: Pt presented with complain of fatigue and not feeling well since last couple of days. Patient has dementia and is not a good historian. Per daughter, she is at baseline for her mental status. She knew her name, knew she is in hospital, could not answer year or name of the hospital or name of president. She did not know why she is here at the hospital.  Patient told daughter that she does not feel right and head feels goofy. She did have fever today of 102. She has dry cough. No pain. She also felt lightheaded this morning. Otherwise no other complains. She was found to be COVID positive at ED. She was saturating well on RA. CXR was negative, showed mild vascular congestion. Hospitalist team was contacted as per ED physician, MICHELE informed him that they would not be able to take care of the patient as she has been less active and not getting up or taking care of herself    Restrictions/Precautions:  Restrictions/Precautions: General Precautions, Fall Risk, Contact Precautions  Position Activity Restriction  Other position/activity restrictions: Droplet+ precautions    Subjective  Chart Reviewed: Yes, Orders, History and Physical, Other (comment) (PT evaluation)  Patient assessed for rehabilitation services?: Yes  Family / Caregiver Present: No    Subjective: Pleasant but confused  Comments: RN approved session. Pt had just returned from doing self care at the sink. She had no C/O pain. Pain: None reported.     Vitals: Oxygen: Pt had O2 saturation of 93% at rest on room air and 95% after having been up and doing activity in the bathroom    Social/Functional History:  Lives With: Spouse  Type of Home: Assisted living  Home Layout: One level  Home Access: Level entry  Home Equipment: Walker, rolling   Bathroom Shower/Tub: Walk-in shower  Bathroom Toilet: Standard  Bathroom Accessibility: Accessible    Receives Help From: Family  ADL Assistance: Independent  Homemaking Assistance: Needs assistance  Homemaking Responsibilities: No  Ambulation Assistance: Independent  Transfer Assistance: Independent    Active : No  Patient's  Info: Family or the facility provides transportation  Occupation: Retired  Leisure & Hobbies: Pt has difficulty with reading secondary to poor vision and needing updated prescription for her glasses  Additional Comments: Questionable reliability d/t poor historian    VISION: Wears glasses; has eye drops for glaucoma    HEARING:  Corrected    COGNITION: Decreased Insight and Impaired Memory    RANGE OF MOTION:  Right Upper Extremity: Impaired - limited shoulder flexion 0-80 degrees and external rotation 0-20 degrees secondary to OA  Left Upper Extremity:  WFL    STRENGTH:  Right Upper Extremity: Not Tested  Left Upper Extremity:  Impaired - 3+/5 deltoid; 3+/5 pectoral; 4-/5 biceps and triceps    SENSATION:   WFL    ADL:   Grooming: Contact Guard Assistance. Pt washed her hair with assist using a shampoo cap but pt did dry off and comb her own hair  Toileting: Supervision. No difficulty noted with wiping herself after having voided  Toilet Transfer: Supervision. To/from the standard toilet seat using a grabbar . BALANCE:  Sitting Balance:  Supervision. Drying off her hair  Standing Balance: Stand By Assistance. Combing her hair at the sink    BED MOBILITY:  Not Tested    TRANSFERS:  Sit to Stand:  Supervision. From the recliner chair  Stand to Sit: Supervision. To the recliner chair    FUNCTIONAL MOBILITY:  Assistive Device: Rolling Walker  Assist Level:  Stand By Assistance. Distance:  To and from bathroom  Pt ambulated with even step and no LOB. Activity Tolerance:  Patient tolerance of  treatment: fair. Pt stood for 2 trials of 1 minute each while doing self care in the bathroom. Pt needed rest breaks between activities. Assessment:  Assessment: Patient would benefit from continued skilled OT services to address above deficits. She presents with debility. She has hx of fibromyalgia, dementia, CAD and glaucoma. 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 Pt was independent with self care prior to admission. She ambulated with a rolling walker with supervision. Pt demonstrated ambulating with SBA using a rolling walker. She completed transfers with supervision. She did her grooming with CGA for washing her hair- she did brush her own teeth and comb/dry off her hair without difficulty. She completed toileting routine with supervision. Pt has difficulty with short term memory. She could not recall details of a conversation earlier in the session. She was oriented to season but not month or year. She needed cues to recall why she was brought to the hospital or for what she was being treated. Performance deficits / Impairments: Decreased functional mobility , Decreased endurance, Decreased strength, Decreased cognition  Prognosis: Good  REQUIRES OT FOLLOW-UP: Yes  Decision Making: Low Complexity    Treatment Initiated: Treatment and education initiated within context of evaluation. Evaluation time included review of current medical information, gathering information related to past medical, social and functional history, completion of standardized testing, formal and informal observation of tasks, assessment of data and development of plan of care and goals. Treatment time included skilled education and facilitation of tasks to increase safety and independence with ADL's for improved functional independence and quality of life.   Pt was able to be oriented to date and day of week with visual cue. She could recall her birthdate and confirm her age at this time. She was  uncertain whether her spouse was alive but stated it had been awhile since she had seen him. Pt agreed to work with therapy. She had mild fatigue during session. No coughing noted. Discharge Recommendations:  Patient would benefit from continued therapy after discharge, Subacute/Skilled Nursing Facility    Patient Education:     Patient Education  Education Given To: Patient  Education Provided: Role of Therapy, Plan of Care, Orientation  Education Method: Verbal  Barriers to Learning: Vision, Cognition  Education Outcome: Verbalized understanding, Continued education needed    Equipment Recommendations:  Equipment Needed: No    Plan:  Times Per Week: 5x  Current Treatment Recommendations: Strengthening, Functional mobility training, Endurance training, Self-Care / ADL, Cognitive reorientation  Additional Comments: Pt would benefit from continued skilled OT services when medically stable and discharged from Acute. OT recommended while at [facility]. Specific Instructions for Next Treatment: Functional mobility; ADLs and endurance training; upper body ROM exercises and steady breathing technique. See long-term goal time frame for expected duration of plan of care. If no long-term goals established, a short length of stay is anticipated. Goals:  Patient goals : \"I want to get back to my routine. \" pt states  Short Term Goals  Time Frame for Short Term Goals: By discharge  Short Term Goal 1: Pt will demonstrate functional mobility walking to/from the bathroom or in the hallway around obstacles with SBA while using a rolling walker to prepare for doing ADLs at the sink and going to/from the dining room. Short Term Goal 2: Pt will complete simple ADLs while standing for over 4 minute duration with SBA to increase her endurance for ease of doing her bathing or dressing and grooming.   Short Term Goal 3: Pt will be oriented to place and date and situation daily with cues as needed to increase her safety and facilitate progress toward her goal of being able to return to previous functioning. Short Term Goal 4: Pt will complete BUE ROM exercises as able to tolerate with demonstration to increase her endurance and strength for ease of doing self care and functional mobility. Additional Goals?: No         Following session, patient left in safe position with all fall risk precautions in place.

## 2022-11-14 NOTE — PLAN OF CARE
Problem: Discharge Planning  Goal: Discharge to home or other facility with appropriate resources  Outcome: Progressing  Flowsheets (Taken 11/14/2022 1043 by Mamie Kelley RN)  Discharge to home or other facility with appropriate resources: Identify barriers to discharge with patient and caregiver     Consult received. Please see SW note dated 11/14.

## 2022-11-14 NOTE — PROGRESS NOTES
hospital or name of president. She did not know why she is here at the hospital.  Patient told daughter that she does not feel right and head feels goofy. She did have fever today of 102. She has dry cough. No pain. She also felt lightheaded this morning. Otherwise no other complains. She was found to be COVID positive at ED. She was saturating well on RA. CXR was negative, showed mild vascular congestion. Hospitalist team was contacted as per ED physician, MICHELE informed him that they would not be able to take care of the patient as she has been less active and not getting up or taking care of herself. Thus, she was admitted for safe discharge placement. Subjective: Sitting up in chair. Denies any pain. Denies headache, dizziness, or lightheadedness. Denies chest pain or SOB. Denies abdominal pain or nausea. Denies dysuria or hematuria.              Medications:  Reviewed    Infusion Medications    sodium chloride       Scheduled Medications    miconazole   Topical BID    Vitamin D  1,000 Units Oral Daily    zinc sulfate  50 mg Oral Daily    ascorbic acid  1,000 mg Oral Daily    amLODIPine  5 mg Oral Daily    aspirin  81 mg Oral Daily    atorvastatin  10 mg Oral Nightly    calcium-cholecalciferol  1 tablet Oral BID    clopidogrel  75 mg Oral Daily    cyanocobalamin  1,000 mcg Oral Daily    famotidine  40 mg Oral Daily    ferrous sulfate  325 mg Oral BID WC    gabapentin  400 mg Oral TID    metoprolol succinate  12.5 mg Oral Daily    metoprolol succinate  37.5 mg Oral Nightly    mirtazapine  7.5 mg Oral Nightly    pantoprazole  40 mg Oral QAM AC    sennosides-docusate sodium  1 tablet Oral Daily    timolol  1 drop Both Eyes BID    valsartan  320 mg Oral Daily    sodium chloride flush  5-40 mL IntraVENous 2 times per day    enoxaparin  40 mg SubCUTAneous Daily     PRN Meds: zinc oxide, hydrALAZINE, meclizine, sodium chloride flush, sodium chloride, ondansetron **OR** ondansetron, polyethylene glycol, acetaminophen **OR** acetaminophen      Intake/Output Summary (Last 24 hours) at 11/14/2022 1323  Last data filed at 11/14/2022 0950  Gross per 24 hour   Intake 400 ml   Output --   Net 400 ml         Diet:  ADULT DIET; Regular    Exam:  BP (!) 166/74   Pulse 69   Temp 98.9 °F (37.2 °C) (Oral)   Resp 18   Ht 4' 9\" (1.448 m)   Wt 138 lb 7.2 oz (62.8 kg)   SpO2 92%   BMI 29.96 kg/m²     General appearance: No apparent distress, appears stated age and cooperative. HEENT: Pupils equal, round, and reactive to light. Conjunctivae/corneas clear. Neck: Supple, with full range of motion. No jugular venous distention. Trachea midline. Respiratory:  Normal respiratory effort. Clear to auscultation, bilaterally without Rales/Wheezes/Rhonchi. Cardiovascular: Regular rate and rhythm with normal S1/S2 without murmurs, rubs or gallops. Abdomen: Soft, non-tender, non-distended with normal bowel sounds. Musculoskeletal: passive and active ROM x 4 extremities. Skin: Skin color, texture, turgor normal.  No rashes or lesions. Neurologic:  Neurovascularly intact without any focal sensory/motor deficits. Psychiatric: Alert and pleasant. Oriented to name only. Capillary Refill: Brisk,< 3 seconds   Peripheral Pulses: +2 palpable, equal bilaterally       Labs:   Recent Labs     11/11/22  1615 11/13/22  0502   WBC 5.7 4.7*   HGB 12.6 12.6   HCT 38.4 39.1    183       Recent Labs     11/11/22  1615 11/13/22  0502 11/14/22  0531   * 132* 134*   K 3.8 4.1 4.1   CL 93* 95* 97*   CO2 26 24 27   BUN 10 12 10   CREATININE 0.4 0.4 0.5   CALCIUM 8.8 9.2 9.3       Recent Labs     11/11/22  1615   AST 24   ALT 13   BILIDIR <0.2   BILITOT 0.4   ALKPHOS 94       No results for input(s): INR in the last 72 hours. No results for input(s): Melany Luz in the last 72 hours.     Urinalysis:      Lab Results   Component Value Date/Time    NITRU NEGATIVE 11/12/2022 04:45 AM    WBCUA 25-50 11/12/2022 04:45 AM    BACTERIA NONE SEEN 11/12/2022 04:45 AM    RBCUA 3-5 11/12/2022 04:45 AM    BLOODU NEGATIVE 11/12/2022 04:45 AM    SPECGRAV 1.019 11/12/2022 04:45 AM    GLUCOSEU 100 11/11/2022 04:15 PM       Radiology:  XR CHEST PORTABLE   Final Result   1. No consolidation. 2. Cardiomegaly with mild pulmonary vascular congestion. This document has been electronically signed by: Lisa Del Valle MD on    11/11/2022 06:11 PM          Diet: ADULT DIET; Regular    DVT prophylaxis: [x] Lovenox                                 [] SCDs                                 [] SQ Heparin                                 [] Encourage ambulation           [] Already on Anticoagulation     Disposition:    [] Home       [] TCU       [] Rehab       [] Psych       [x] SNF       [] [place]       [] Other-    Code Status: DNR-CC    PT/OT Eval Status: following, appreciate recommendations.        Electronically signed by Shanta Matthews BSN, RN, AG-ANCP Student on 11/14/2022 at 1:23 PM     [address] [facility], APRN - CNP 11/14/2022 1313

## 2022-11-14 NOTE — PLAN OF CARE
Problem: Safety - Adult  Goal: Free from fall injury  Outcome: Progressing     Problem: Confusion  Goal: Confusion, delirium, dementia, or psychosis is improved or at baseline  Description: INTERVENTIONS:  1. Assess for possible contributors to thought disturbance, including medications, impaired vision or hearing, underlying metabolic abnormalities, dehydration, psychiatric diagnoses, and notify attending LIP  2. [place] high risk fall precautions, as indicated  3. Provide frequent short contacts to provide reality reorientation, refocusing and direction  4. Decrease environmental stimuli, including noise as appropriate  5. Monitor and intervene to maintain adequate nutrition, hydration, elimination, sleep and activity  6. If unable to ensure safety without constant attention obtain sitter and review sitter guidelines with assigned personnel  7. Initiate Psychosocial CNS and Spiritual Care consult, as indicated  Outcome: Progressing   Patient remains on isolation and is high fall risk due to Covid and baseline confused states and forgetful nature. Patient is with low grade temp at shift start but under 100 degrees oral. She denies pain, remains on room air and no evidence of anxiety or nausea/vomiting.  Patient with bed alarm on and is utilizing the call light more over the past 2-3 nights and plans to return back to [place] with her spouse when deemed medically stable

## 2022-11-14 NOTE — PLAN OF CARE
Problem: Safety - Adult  Goal: Free from fall injury  Outcome: Progressing  Flowsheets (Taken 11/14/2022 0900)  Free From Fall Injury: Instruct family/caregiver on patient safety     Problem: Skin/Tissue Integrity  Goal: Absence of new skin breakdown  Outcome: Progressing     Problem: Discharge Planning  Goal: Discharge to home or other facility with appropriate resources  11/14/2022 1747 by Vashti Nascimento RN  Outcome: Progressing  11/14/2022 1422 by ELLIOT Felipe  Outcome: Progressing  Flowsheets (Taken 11/14/2022 1043 by Vashti Nascimento, RN)  Discharge to home or other facility with appropriate resources: Identify barriers to discharge with patient and caregiver

## 2022-11-14 NOTE — PROGRESS NOTES
[address] [address]  INPATIENT PHYSICAL THERAPY  EVALUATION  [facility] ONC MED 5K - 5K-20/020-A    Time In: 5970  Time Out: 4653  Timed Code Treatment Minutes: 8 Minutes  Minutes: 15          Date: 2022  Patient Name: Ash Ray,  Gender:  female        MRN: [id number]  : 1928  (80 y.o.)      Referring Practitioner: GABRIELA Chu CNP  Diagnosis: Debility  Additional Pertinent Hx: 80 y.o. female who presented with complain of fatigue and not feeling well since last couple of days. Patient has dementia and is not a good historian. Per daughter, she is at baseline for her mental status. Patient told daughter that she does not feel right and head feels goofy. She did have fever today of 102. She has dry cough. She was found to be COVID positive at ED. She was saturating well on RA. Restrictions/Precautions:  Restrictions/Precautions: General Precautions, Fall Risk    Subjective:  Chart Reviewed: Yes  Patient assessed for rehabilitation services?: Yes  Family / Caregiver Present: No  Subjective: RN approved session.  Pt pleasant and agreeable to therapy    General:  Overall Orientation Status: Impaired  Orientation Level: Oriented to place, Oriented to person, Disoriented to time, Disoriented to situation  Vision: Within Functional Limits  Hearing: Within functional limits       Pain: 0/10: denies pain     Vitals: Vitals not assessed per clinical judgement, see nursing flowsheet  Room Air     Social/Functional History:    Lives With: Spouse  Type of Home: Assisted living  Home Layout: One level  Home Access: Level entry  Home Equipment: dilcia Alicia                   Ambulation Assistance: Independent (with walker)  Transfer Assistance: Independent          Additional Comments: questionable reliability d/t poor historian    OBJECTIVE:  Range of Motion:  Bilateral Lower Extremity: WFL    Strength:  Bilateral Lower Extremity: Impaired - deconditioned    Balance:  Static Sitting Balance:  Stand By Assistance  Static Standing Balance: Stand By Assistance, with RW     Bed Mobility:  Supine to Sit: Stand By Assistance, with head of bed raised    Transfers:  Sit to Stand: Stand By Assistance, from EOB with RW in front   Stand to Sit:Stand By Assistance, with verbal cues, to/from chair with arms    Ambulation:  Stand By Assistance, with increased time for completion  Distance: 15'  Surface: Level Tile  Device:Rolling Walker  Gait Deviations: Forward Flexed Posture, Slow Татьяна, Decreased Step Length Bilaterally, Decreased Arm Swing, Decreased Trunk Rotation, and Decreased Gait Speed    Functional Outcome Measures: Completed  AM-PAC Inpatient Mobility without Stair Climbing Raw Score : 15  AM-PAC Inpatient without Stair Climbing T-Scale Score : 43.03    ASSESSMENT:  Activity Tolerance:  Patient tolerance of  treatment: good. No significant LOB or SOB noted       Treatment Initiated: Treatment and education initiated within context of evaluation. Evaluation time included review of current medical information, gathering information related to past medical, social and functional history, completion of standardized testing, formal and informal observation of tasks, assessment of data and development of plan of care and goals. Treatment time included skilled education and facilitation of tasks to increase safety and independence with functional mobility for improved independence and quality of life. Assessment: Body Structures, Functions, Activity Limitations Requiring Skilled Therapeutic Intervention: Decreased functional mobility , Decreased endurance, Decreased balance, Decreased strength, Decreased posture  Assessment: Ravindra Zapata is a 80 y.o. female that presents with malaise. she is ind prior to admission now requiring assist for basic mobility.  Pt demonstrates a decrease in baseline by way of bed mobility, transfers and ambulation secondary to decreased activity tolerance, strength, fatigue, and balance deficits. Pt will benefit from skilled PT services throughout admission and beyond hospital discharge for improvements in functional mobility and in order to decrease fall risk and return pt to PLOF. Therapy Prognosis: Good    Requires PT Follow-Up: Yes    Discharge Recommendations:  Discharge Recommendations: 24 hour supervision or assist    Patient Education:      . Patient Education  Education Given To: Patient  Education Provided: Plan of Care, Role of Therapy, Fall Prevention Strategies  Education Method: Verbal  Barriers to Learning: None  Education Outcome: Verbalized understanding       Equipment Recommendations:  Equipment Needed: No    Plan:  Current Treatment Recommendations: Strengthening, Balance training, Gait training, Functional mobility training, Transfer training, Endurance training, Safety education & training  General Plan:  (5x GM)    Goals:  Patient Goals : none stated  Short Term Goals  Time Frame for Short Term Goals: by discharge  Short Term Goal 1: bed mobility with HOB flat, no rails, mod I for increased functional ind  Short Term Goal 2: sit<>Stand from various surfaces with LRD mod I for safe transfers  Short Term Goal 3: ambulate 100' with LRD mod I for safe household distances  Long Term Goals  Time Frame for Long Term Goals : NA d/t short ELOS    Following session, patient left in safe position with all fall risk precautions in place.      Yulia Beckman ([facility]) PT, DPT

## 2022-11-14 NOTE — CARE COORDINATION
DISCHARGE/PLANNING EVALUATION  11/14/22, 2:22 PM EST    Reason for Referral: Patient is from [address] [place] [address],[address], may need ECF  Mental Status: Patient is confused at baseline, has dementia  Decision Making: Family   Family/Social/Home Environment: Lives at [address] [place] [address],[address] with  who is in a wheelchair at baseline, has supportive daughter  Current Services including food security, transportation and housekeeping: Lives at [facility], they provide all services  Current Equipment: [address]: [employer]  Concerns or Barriers to Discharge:  None  Post-acute [facility]) provider list was provided to patient. Patient was informed of their freedom to choose [facility] provider. Discussed and offered to show the patient the relevant [facility] Providers quality and resource use measures on Medicare Compare web site via computer based on patient's goals of care and treatment preferences. Questions regarding selection process were answered. Teach Back Method used with Lyndsay Ivey regarding care plan and discharge planning  Patient and daughter Gretchen Nicole verbalize understanding of the plan of care and contribute to goal setting. Patient goals, treatment preferences and discharge plan: Per PT and OT notes, patient would benefit from a skilled rehab stay before returning to [facility]. [facility] at [facility] is agreement with this. Family and patient are agreeable to rehab, called and spoke with Fay Lozada at [email or web address]. He will have the team look her over, patient will be a precert. Electronically signed by Pankaj Fuller on 11/14/2022 at 2:22 PM      Update 3:06 PM- Received a call from Fay Lozada at [email or web address], they are able to accept and will start a precert today.

## 2022-11-14 NOTE — CARE COORDINATION
11/14/22, 2:19 PM EST      DISCHARGE PLANNING EVALUATION    Hubert Leon  Admitted: 11/11/2022  Hospital Day: 0    Location: 5-20/020-A Reason for admit: Verlan Leys [R53.81]  Failure to thrive in adult [R62.7]  Urinary tract infection without hematuria, site unspecified [N39.0]  COVID-19 [U07.1]    Past Medical History:   Diagnosis Date    ASHD (arteriosclerotic heart disease)     Constipation     DDD (degenerative disc disease), lumbar     Dyslipidemia     Fibromyalgia     GERD (gastroesophageal reflux disease)     Glaucoma     History of vertebral compression fracture; L1     Hypertension, essential     Nausea & vomiting     Osteoporosis     S/P PTCA: 2/2/2015: Stenting mid-LAD using Xience Alpine 2.75 X 28 mm, post-dilated to 3.0 mm distally and 3.18 mm proximally. 02/02/2015 2/2/2015: Stenting mid-LAD using Xience Alpine 2.75 X 28 mm, post-dilated to 3.0 mm distally and 3.18 mm proximally. Dr. Lillie Pardo    Status post tympanoplasty 6/24/2013    Tinnitus of both ears 11/19/2013       Procedure: N/A  Barriers to Discharge: Patient presents from [facility]. F. due to febrile illness. + COVID. Tmax 99.4. Lovenox, Vitamin C,D, and Zinc, prn Tylenol and Zofra, regular diet, droplet isolation, PT/OT/ST, SS, up with assistance. PCP: Rajesh Lion, DO    Readmission Risk              Risk of Unplanned Readmission:  0         Patient's Healthcare Decision Maker: Named in [address] [place] [address]    Patient Goals/Plan/Treatment Preferences: Camden Clemente is from [employer]. She is planned for [facility] at discharge. SS following. Transportation/Food Security/Housekeeping Addressed: No issues identified.

## 2022-11-15 VITALS
WEIGHT: 138.45 LBS | OXYGEN SATURATION: 92 % | HEIGHT: 57 IN | DIASTOLIC BLOOD PRESSURE: 75 MMHG | BODY MASS INDEX: 29.87 KG/M2 | RESPIRATION RATE: 18 BRPM | TEMPERATURE: 99 F | SYSTOLIC BLOOD PRESSURE: 154 MMHG | HEART RATE: 77 BPM

## 2022-11-15 PROCEDURE — 6370000000 HC RX 637 (ALT 250 FOR IP): Performed by: NURSE PRACTITIONER

## 2022-11-15 PROCEDURE — 6370000000 HC RX 637 (ALT 250 FOR IP): Performed by: STUDENT IN AN ORGANIZED HEALTH CARE EDUCATION/TRAINING PROGRAM

## 2022-11-15 PROCEDURE — 96372 THER/PROPH/DIAG INJ SC/IM: CPT

## 2022-11-15 PROCEDURE — 99239 HOSP IP/OBS DSCHRG MGMT >30: CPT | Performed by: NURSE PRACTITIONER

## 2022-11-15 PROCEDURE — G0378 HOSPITAL OBSERVATION PER HR: HCPCS

## 2022-11-15 PROCEDURE — 2580000003 HC RX 258: Performed by: STUDENT IN AN ORGANIZED HEALTH CARE EDUCATION/TRAINING PROGRAM

## 2022-11-15 PROCEDURE — 6360000002 HC RX W HCPCS: Performed by: STUDENT IN AN ORGANIZED HEALTH CARE EDUCATION/TRAINING PROGRAM

## 2022-11-15 PROCEDURE — 1200000000 HC SEMI PRIVATE

## 2022-11-15 RX ADMIN — SODIUM CHLORIDE, PRESERVATIVE FREE 10 ML: 5 INJECTION INTRAVENOUS at 00:36

## 2022-11-15 RX ADMIN — Medication 1 TABLET: at 10:45

## 2022-11-15 RX ADMIN — ENOXAPARIN SODIUM 40 MG: 100 INJECTION SUBCUTANEOUS at 10:46

## 2022-11-15 RX ADMIN — ASPIRIN 81 MG 81 MG: 81 TABLET ORAL at 10:53

## 2022-11-15 RX ADMIN — OXYCODONE HYDROCHLORIDE AND ACETAMINOPHEN 1000 MG: 500 TABLET ORAL at 10:45

## 2022-11-15 RX ADMIN — Medication 1000 MCG: at 10:59

## 2022-11-15 RX ADMIN — GABAPENTIN 400 MG: 400 CAPSULE ORAL at 10:56

## 2022-11-15 RX ADMIN — VALSARTAN 320 MG: 320 TABLET, FILM COATED ORAL at 10:56

## 2022-11-15 RX ADMIN — Medication 50 MG: at 10:57

## 2022-11-15 RX ADMIN — MICONAZOLE NITRATE: 20 POWDER TOPICAL at 10:30

## 2022-11-15 RX ADMIN — TIMOLOL MALEATE 1 DROP: 5 SOLUTION OPHTHALMIC at 10:33

## 2022-11-15 RX ADMIN — CLOPIDOGREL BISULFATE 75 MG: 75 TABLET ORAL at 10:53

## 2022-11-15 RX ADMIN — SENNOSIDES AND DOCUSATE SODIUM 1 TABLET: 50; 8.6 TABLET ORAL at 10:53

## 2022-11-15 RX ADMIN — FAMOTIDINE 40 MG: 20 TABLET ORAL at 10:53

## 2022-11-15 RX ADMIN — AMLODIPINE BESYLATE 5 MG: 5 TABLET ORAL at 10:58

## 2022-11-15 RX ADMIN — PANTOPRAZOLE SODIUM 40 MG: 40 TABLET, DELAYED RELEASE ORAL at 05:16

## 2022-11-15 RX ADMIN — Medication 1000 UNITS: at 10:57

## 2022-11-15 RX ADMIN — POLYETHYLENE GLYCOL 3350 17 G: 17 POWDER, FOR SOLUTION ORAL at 10:52

## 2022-11-15 RX ADMIN — ACETAMINOPHEN 650 MG: 325 TABLET ORAL at 05:16

## 2022-11-15 RX ADMIN — FERROUS SULFATE TAB 325 MG (65 MG ELEMENTAL FE) 325 MG: 325 (65 FE) TAB at 10:45

## 2022-11-15 RX ADMIN — METOPROLOL SUCCINATE 12.5 MG: 25 TABLET, FILM COATED, EXTENDED RELEASE ORAL at 10:58

## 2022-11-15 ASSESSMENT — PAIN DESCRIPTION - LOCATION: LOCATION: SHOULDER

## 2022-11-15 ASSESSMENT — PAIN DESCRIPTION - ORIENTATION: ORIENTATION: RIGHT

## 2022-11-15 NOTE — DISCHARGE INSTR - COC
Continuity of Care Form    Patient Name: Elder Mendez   :  1928  MRN:  [id number]    [facility] date:  2022  Discharge date:  11/15/22    Code Status Order: DNR-CC   Advance Directives:     Admitting Physician:  Massiel Martinez MD  PCP: Bernardo Umana DO    Discharging Nurse: Kasandra Desai Unit/Room#: 5K-20/020-A  Discharging Unit Phone Number: [phone]    Emergency Contact:   Extended Emergency Contact Information  Primary Emergency Contact: Brett 2 [address] Phone: [phone]  Relation: Child  Secondary Emergency Contact: Chris Chamberlain  Address: [address] [address] [address] Phone: [phone]  Relation: Spouse    Past Surgical History:  Past Surgical History:   Procedure Laterality Date    [address]  2015    stent x1    COLON SURGERY  1994    COLONOSCOPY  2005    EYE SURGERY  2009    left corneal transplant    EYE SURGERY      right corneal transplant    EYE SURGERY      bilateral cataract    HC PAIN BLOCK Left 2018    Synvisc ONE LEFT knee performed by Giselle Albarado MD at [facility] ([facility])  1969    hysterectomy    KYPHOSIS SURGERY      L-1 compression fracture    MIDDLE EAR SURGERY  2005    right tympnaoplasty by dr Eric Fontaine Right 2013       Immunization History:   Immunization History   Administered Date(s) Administered    COVID-19, PFIZER GRAY top, DO NOT Dilute, (age 15 y+), IM, 30 mcg/0.3 mL 2022    COVID-19, PFIZER PURPLE top, DILUTE for use, (age 15 y+), 30mcg/0.3mL 2021, 2021, 2021    Influenza Virus Vaccine 10/07/2021    Influenza, High Dose (Fluzone 65 yrs and older) 2017, 2018, 2020    Influenza, Triv, inactivated, subunit, adjuvanted, IM (Fluad 65 yrs and older) 10/09/2018    Pneumococcal Conjugate 13-valent (Vianey Rim) 12/18/2017    Pneumococcal Polysaccharide ([id number]) 09/27/2018       Active Problems:  Patient Active Problem List   Diagnosis Code    Hyponatremia E87.1    VMR (vasomotor rhinitis) J30.0    Status post tympanoplasty Z98.890    Tinnitus of both ears H93.13    S/P PTCA: 2/2/2015: Stenting mid-LAD using Xience Alpine 2.75 X 28 mm, post-dilated to 3.0 mm distally and 3.18 mm proximally. Z98.61    ASHD (arteriosclerotic heart disease) I25.10    Constipation K59.00    DDD (degenerative disc disease), lumbar M51.36    Dyslipidemia E78.5    Glaucoma H40.9    History of vertebral compression fracture; L1 Z87.81    Hypertension, essential I10    Iron deficiency anemia D50.9    Debility R53.81    COVID-19 U5.3    Advanced age R48       Isolation/Infection:   Isolation            Droplet Plus          Patient Infection Status       Infection Onset Added Last Indicated Last Indicated By Review Planned Expiration Resolved Resolved By    COVID-19 11/11/22 11/11/22 11/11/22 COVID-19 & Influenza Combo 11/21/22 11/25/22      +11/11    Resolved    COVID-19 (Rule Out) 11/11/22 11/11/22 11/11/22 COVID-19 & Influenza Combo (Ordered)   11/11/22 Rule-Out Test Resulted            Nurse Assessment:  Last Vital Signs: BP (!) 143/63   Pulse 74   Temp 100.3 °F (37.9 °C) (Oral)   Resp 18   Ht 4' 9\" (1.448 m)   Wt 138 lb 7.2 oz (62.8 kg)   SpO2 91%   BMI 29.96 kg/m²     Last documented pain score (0-10 scale):    Last Weight:   Wt Readings from Last 1 Encounters:   11/11/22 138 lb 7.2 oz (62.8 kg)     Mental Status:  disoriented and alert    IV Access:  - None    Nursing Mobility/ADLs:  Walking   Assisted  Transfer  Assisted  Bathing  Assisted  Dressing  Assisted  Toileting  Assisted  Feeding  Independent  Med Admin  Assisted  Med Delivery   whole and one or two at a time     Wound Care Documentation and Therapy:        Elimination:  Continence:    Bowel: Yes  Bladder: Yes  Urinary Catheter: None   Colostomy/Ileostomy/Ileal Conduit: No       Date of Last BM: 11/15/22    Intake/Output Summary (Last 24 hours) at 11/15/2022 0756  Last data filed at 11/14/2022 1300  Gross per 24 hour   Intake 480 ml   Output --   Net 480 ml     I/O last 3 completed shifts: In: 80 [P.O.:780]  Out: -     Safety Concerns: At Risk for Falls    Impairments/Disabilities:      Hearing    Nutrition Therapy:  Current Nutrition Therapy:   - Oral Diet:  General    Routes of Feeding: Oral  Liquids: No Restrictions  Daily Fluid Restriction: no  Last Modified Barium Swallow with Video (Video Swallowing Test): not done    Treatments at the Time of Hospital Discharge:   Respiratory Treatments: na  Oxygen Therapy:  is not on home oxygen therapy.   Ventilator:    - No ventilator support    Rehab Therapies: Physical Therapy and Occupational Therapy  Weight Bearing Status/Restrictions: No weight bearing restrictions  Other Medical Equipment (for information only, NOT a DME order):  walker  Other Treatments: na     Patient's personal belongings (please select all that are sent with patient):  Hearing Aides bilateral, Dentures upper and lower    RN SIGNATURE:  Electronically signed by Dilip Lamar RN on 11/15/22 at 11:38 AM EST    CASE MANAGEMENT/SOCIAL WORK SECTION    Inpatient Status Date: 11/11/2022    Readmission Risk Assessment Score:  Readmission Risk              Risk of Unplanned Readmission:  0           Discharging to Facility/ Agency   Name: [address], [address] [place] [address]  [address] [place] [address]    Dialysis Facility (if applicable)   Name:  Address:  Dialysis Schedule:  Phone:  Fax:    / signature: Electronically signed by ELLIOT Ford on 11/15/22 at 7:57 AM EST    PHYSICIAN SECTION    Prognosis: Good    Condition at Discharge: Stable    Rehab Potential (if transferring to Rehab): Good    Recommended Labs or Other Treatments After Discharge: PT/OT    Physician Certification: I certify the above information and transfer of Winnie Arnold  is necessary for the continuing treatment of the diagnosis listed and that she requires [place] for less 30 days.      Update Admission H&P: No change in H&P    PHYSICIAN SIGNATURE:  Electronically signed by Tameka Beckford MD on 11/15/22 at 9:31 AM EST

## 2022-11-15 NOTE — CARE COORDINATION
IMM Letter  IMM Letter given to Patient/Family/Significant other/Guardian/POA/by[de-identified] Abdiel Fong Mgr. IMM Letter date given[de-identified] 11/15/22  IMM Letter time given[de-identified] 1150  Observation Status Letter date given[de-identified] 11/11/22  Observation Status Letter time given[de-identified] 1927  Observation Status Letter given to Patient/Family/Significant other/Guardian/POA/by[de-identified] Pt. Access     Pt verbalized understanding and gave permission for possible discharge within 4 hours of receiving IMM.

## 2022-11-15 NOTE — DISCHARGE SUMMARY
Discharge Summary    Patient: Jenniffer Lugo  YOB: 1928    MRN: [id number]   Acct: [de-identified]    Primary Care Physician: Tessa Adorno DO    Admit date:  11/11/2022    Discharge date:   11/15/2022      Discharge Diagnoses and Hospital Course:     Patient admitted from [address] [place] [address] after complaints of not feeling well and fatigue. Tested positive for COVID. Saturating well on RA. CXR negative. Admitted for safe discharge placement due to patient being less active and unable to care for herself at [place]. COVID-19 infection:   Continue isolation at SNF until 11/21/2022. Continue Vitamin C, Zinc supplements for immune support. Weakness due to physical deconditioning:   Continue PT/OT at [facility]. Hyponatremia, mild, chronic:   Follow up with PCP. Severe dementia:   Mentation at baseline. Perineal Dermatitis. Likely diaper rash. Continue zinc oxide cream and miconazole powder. Scripts sent to patients pharmacy. CAD:   Continue ASA, clopidogrel. Essential HTN:   Follows with Dr. David Cadet as OP. Keep follow up appointment. Continue amlodipine, metoprolol succinate, valsartan. HLD:   Continue atorvastatin. Iron deficiency anemia with B12 deficiency:   Continue ferrous sulfate, cyanocobalamin. Constipation:   Continue Senokot-S. Fibromyalgia:   Continue gabapentin. Glaucoma:   Continue timolol. Depression:   Continue mirtazapine. Advanced age. Code status verified as DNR CC. GERD:   Continue famotidine and pantoprazole. Admitted for: (HPI) fatigue    Consultants:  NA    Discharge Medications:       Medication List        START taking these medications      ascorbic acid 1000 MG tablet  Commonly known as: VITAMIN C  Take 1 tablet by mouth daily     miconazole 2 % powder  Commonly known as: MICOTIN  Apply topically 2 times daily.      ocuvite Tabs oral tablet     zinc oxide 40 % Pste paste  Apply to rash as needed     zinc sulfate 220 (50 Zn) MG capsule  Commonly known as: ZINCATE  Take 1 capsule by mouth daily            CONTINUE taking these medications      amLODIPine 5 MG tablet  Commonly known as: NORVASC  Take 1 tablet by mouth once daily     aspirin 81 MG chewable tablet     atorvastatin 10 MG tablet  Commonly known as: LIPITOR  Take 1 tablet by mouth once daily     CALCIUM CITRATE + PO     carbamide peroxide 6.5 % otic solution  Commonly known as: DEBROX     clopidogrel 75 MG tablet  Commonly known as: PLAVIX  Take 1 tablet by mouth once daily     cyanocobalamin 1000 MCG tablet  Commonly known as: [employer] VITAMIN B12  Take 1 tablet by mouth daily     famotidine 40 MG tablet  Commonly known as: PEPCID     ferrous sulfate 325 (65 Fe) MG tablet  Commonly known as: IRON 325  Take 1 tablet by mouth in the morning and 1 tablet in the evening. Take with meals.      Flax Seed Oil 1000 MG Caps     gabapentin 400 MG capsule  Commonly known as: NEURONTIN  TAKE 1 CAPSULE BY MOUTH THREE TIMES DAILY     lidocaine-prilocaine 2.5-2.5 % cream  Commonly known as: EMLA     meclizine 12.5 MG tablet  Commonly known as: ANTIVERT  Take 1 tablet by mouth 3 times daily as needed for Dizziness     * metoprolol tartrate 25 MG tablet  Commonly known as: LOPRESSOR     * metoprolol tartrate 25 MG tablet  Commonly known as: LOPRESSOR     mirtazapine 15 MG tablet  Commonly known as: REMERON  TAKE 1/2 (ONE-HALF) TABLET BY MOUTH NIGHTLY     nitroGLYCERIN 0.4 MG SL tablet  Commonly known as: NITROSTAT  Place 1 tablet under the tongue every 5 minutes as needed for Chest pain     ondansetron 4 MG tablet  Commonly known as: ZOFRAN     pantoprazole 40 MG tablet  Commonly known as: PROTONIX  Take 1 tab daily     polyethylene glycol 17 GM/SCOOP powder  Commonly known as: GLYCOLAX  Take 17 g by mouth daily     SENNA CO     sennosides-docusate sodium 8.6-50 MG tablet  Commonly known as: SENOKOT-S     timolol 0.5 % ophthalmic solution  Commonly known as: TIMOPTIC valsartan 320 MG tablet  Commonly known as: DIOVAN  Take 1 tablet by mouth once daily     Vitamin D3 125 MCG (5000 UT) Tabs           * This list has 2 medication(s) that are the same as other medications prescribed for you. Read the directions carefully, and ask your doctor or other care provider to review them with you.                 STOP taking these medications      acetaminophen 500 MG tablet  Commonly known as: TYLENOL     metoprolol succinate 25 MG extended release tablet  Commonly known as: TOPROL XL               Where to Get Your Medications        These medications were sent to [facility] DR SAIGE ARZATE [address] [place] [address], [address]  [address]      Phone: [phone]   miconazole 2 % powder  zinc oxide 40 % Pste paste       You can get these medications from any pharmacy    You don't need a prescription for these medications  ascorbic acid 1000 MG tablet  zinc sulfate 220 (50 Zn) MG capsule           Physical Exam:    Vitals:  Vitals:    11/14/22 2020 11/14/22 2208 11/15/22 0500 11/15/22 1015   BP: (!) 161/72 (!) 161/72 (!) 143/63 (!) 154/75   Pulse: 69 69 74 77   Resp: 18   18   Temp: 98.5 °F (36.9 °C)  100.3 °F (37.9 °C) 99 °F (37.2 °C)   TempSrc: Oral  Oral Oral   SpO2: 94%  91% 92%   Weight:       Height:         Weight: Weight: 138 lb 7.2 oz (62.8 kg)     24 hour intake/output:No intake or output data in the 24 hours ending 11/15/22 1325    General appearance - alert, well appearing, and in no distress  Chest - clear to auscultation, no wheezes, rales or rhonchi, symmetric air entry  Heart - normal rate, regular rhythm, normal S1, S2, no murmurs, rubs, clicks or gallops  Abdomen - soft, nontender, nondistended, no masses or organomegaly  Obese: No; Protuberant: No   Neurological - alert, oriented to name only, normal speech, no focal findings or movement disorder noted,   Extremities - peripheral pulses normal, no pedal edema, no clubbing or cyanosis  Skin - normal coloration and turgor, diaper rash. Procedures:  EKG    Diagnostic Test:  as noted below    Radiology reports as per the Radiologist  Radiology: XR CHEST PORTABLE    Result Date: 11/11/2022  Single view of the chest. COMPARISON: Single view of the chest dated 2/24/2016 FINDINGS: Cardiomegaly. Atherosclerotic thoracic aorta. Prominence of the central pulmonary vasculature. No consolidation. No definite pleural effusion. No pneumothorax. Vertebral augmentation cement present within the lower thoracic vertebral bodies. [place] right curvature of the lower thoracic spine. No acute fracture identified. 1. No consolidation. 2. Cardiomegaly with mild pulmonary vascular congestion. This document has been electronically signed by: Kaelyn Coppola MD on 11/11/2022 06:11 PM       Results for orders placed or performed during the hospital encounter of 11/11/22   COVID-19 & Influenza Combo    Specimen: Nasopharyngeal Swab   Result Value Ref Range    SARS-CoV-2 RNA, RT PCR DETECTED (AA) NOT DETECTED    INFLUENZA A NOT DETECTED NOT DETECTED    INFLUENZA B NOT DETECTED NOT DETECTED   Culture, Reflexed, Urine    Specimen: Urine   Result Value Ref Range    Urine Culture Reflex (A)      No growth-preliminary Mixed growth. The mixture of organisms present represents both organisms that may cause urinary tract infections and organisms that are not a common cause of urinary tract infections and are possibly skin salud or distal urethral salud.        Organism Mixed Growth (A)    CBC with Auto Differential   Result Value Ref Range    WBC 5.7 4.8 - 10.8 thou/mm3    RBC 4.09 (L) 4.20 - 5.40 mill/mm3    Hemoglobin 12.6 12.0 - 16.0 gm/dl    Hematocrit 38.4 37.0 - 47.0 %    MCV 93.9 81.0 - 99.0 fL    MCH 30.8 26.0 - 33.0 pg    MCHC 32.8 32.2 - 35.5 gm/dl    RDW-CV 15.1 (H) 11.5 - 14.5 %    RDW-SD 51.7 (H) 35.0 - 45.0 fL    Platelets 099 109 - 402 thou/mm3    MPV 9.7 9.4 - 12.4 fL    Seg Neutrophils 72.7 % Lymphocytes 8.0 %    Monocytes 18.3 %    Eosinophils 0.2 %    Basophils 0.5 %    Immature Granulocytes 0.3 %    Segs Absolute 4.1 1.8 - 7.7 thou/mm3    Lymphocytes Absolute 0.5 (L) 1.0 - 4.8 thou/mm3    Monocytes Absolute 1.0 0.4 - 1.3 thou/mm3    Eosinophils Absolute 0.0 0.0 - 0.4 thou/mm3    Basophils Absolute 0.0 0.0 - 0.1 thou/mm3    Immature Grans (Abs) 0.02 0.00 - 0.07 thou/mm3    nRBC 0 /100 wbc   Basic Metabolic Panel   Result Value Ref Range    Sodium 131 (L) 135 - 145 meq/L    Potassium 3.8 3.5 - 5.2 meq/L    Chloride 93 (L) 98 - 111 meq/L    CO2 26 23 - 33 meq/L    Glucose 137 (H) 70 - 108 mg/dL    BUN 10 7 - 22 mg/dL    Creatinine 0.4 0.4 - 1.2 mg/dL    Calcium 8.8 8.5 - 10.5 mg/dL   Hepatic Function Panel   Result Value Ref Range    Albumin 4.1 3.5 - 5.1 g/dL    Total Bilirubin 0.4 0.3 - 1.2 mg/dL    Bilirubin, Direct <0.2 0.0 - 0.3 mg/dL    Alkaline Phosphatase 94 38 - 126 U/L    AST 24 5 - 40 U/L    ALT 13 11 - 66 U/L    Total Protein 6.5 6.1 - 8.0 g/dL   Lipase   Result Value Ref Range    Lipase 22.9 5.6 - 51.3 U/L   Glomerular Filtration Rate, Estimated   Result Value Ref Range    Est, Glom Filt Rate >60 >60 ml/min/1.73m2   Urine with Reflexed Micro   Result Value Ref Range    Glucose, Ur 100 (A) NEGATIVE mg/dl    Bilirubin Urine NEGATIVE NEGATIVE    Ketones, Urine TRACE (A) NEGATIVE    Specific Gravity, Urine 1.017 1.002 - 1.030    Blood, Urine NEGATIVE NEGATIVE    pH, UA 7.5 5.0 - 9.0    Protein, UA NEGATIVE NEGATIVE    Urobilinogen, Urine 0.2 0.0 - 1.0 eu/dl    Nitrite, Urine NEGATIVE NEGATIVE    Leukocyte Esterase, Urine MODERATE (A) NEGATIVE    Color, UA YELLOW STRAW-YELLOW    Character, Urine CLOUDY (A) CLEAR-SL CLOUD    RBC, UA 0-2 0-2/hpf /hpf    WBC, UA 15-25 0-4/hpf /hpf    Epithelial Cells, UA 5-10 3-5/hpf /hpf    Bacteria, UA NONE SEEN FEW/NONE SEEN /hpf    Casts UA 4-8 HYALINE NONE SEEN /lpf    Crystals, UA NONE SEEN NONE SEEN    Renal Epithelial, UA NONE NONE SEEN    Yeast, UA NONE SEEN NONE SEEN    CASTS 2 NONE SEEN NONE SEEN /lpf    MISCELLANEOUS 2 NONE SEEN    Anion Gap   Result Value Ref Range    Anion Gap 12.0 8.0 - 16.0 meq/L   Osmolality   Result Value Ref Range    Osmolality Calc 263.8 (L) 275.0 - 300.0 mOsmol/kg   C-Reactive Protein   Result Value Ref Range    CRP 0.93 0.00 - 1.00 mg/dl   Ferritin   Result Value Ref Range    Ferritin 65 10 - 291 ng/mL   Sedimentation Rate   Result Value Ref Range    Sed Rate 12 0 - 20 mm/hr   Procalcitonin   Result Value Ref Range    Procalcitonin 0.04 0.01 - 0.09 ng/mL   Urinalysis with Microscopic   Result Value Ref Range    Glucose, Urine NEGATIVE NEGATIVE mg/dl    Bilirubin Urine NEGATIVE NEGATIVE    Ketones, Urine 15 (A) NEGATIVE    Specific Gravity, UA 1.019 1.002 - 1.030    Blood, Urine NEGATIVE NEGATIVE    pH, UA 7.0 5.0 - 9.0    Protein, UA TRACE (A) NEGATIVE mg/dl    Urobilinogen, Urine 0.2 0.0 - 1.0 eu/dl    Nitrite, Urine NEGATIVE NEGATIVE    Leukocyte Esterase, Urine MODERATE (A) NEGATIVE    Color, UA YELLOW YELLOW-STRAW    Character, Urine CLEAR CLR-SL.CLOUD    RBC, UA 3-5 0-2/hpf /hpf    WBC, UA 25-50 0-4/hpf /hpf    Epithelial Cells, UA 0-2 3-5/hpf /hpf    Bacteria, UA NONE SEEN FEW/NONE SEEN    Casts 4-8 HYALINE NONE SEEN /lpf    Crystals NONE SEEN NONE SEEN    Renal Epithelial, UA NONE SEEN NONE SEEN    Yeast, UA NONE SEEN NONE SEEN    Casts NONE SEEN /lpf    Miscellaneous Lab Test Result NONE SEEN    Osmolality, Serum   Result Value Ref Range    Osmolality 281 275 - 295 mosmol/kg   Brain Natriuretic Peptide   Result Value Ref Range    Pro-BNP 1045.0 0.0 - 1800.0 pg/mL   Basic Metabolic Panel   Result Value Ref Range    Sodium 132 (L) 135 - 145 meq/L    Potassium 4.1 3.5 - 5.2 meq/L    Chloride 95 (L) 98 - 111 meq/L    CO2 24 23 - 33 meq/L    Glucose 90 70 - 108 mg/dL    BUN 12 7 - 22 mg/dL    Creatinine 0.4 0.4 - 1.2 mg/dL    Calcium 9.2 8.5 - 10.5 mg/dL   CBC with Auto Differential   Result Value Ref Range    WBC 4.7 (L) 4.8 - 10.8 thou/mm3    RBC 4.05 (L) 4.20 - 5.40 mill/mm3    Hemoglobin 12.6 12.0 - 16.0 gm/dl    Hematocrit 39.1 37.0 - 47.0 %    MCV 96.5 81.0 - 99.0 fL    MCH 31.1 26.0 - 33.0 pg    MCHC 32.2 32.2 - 35.5 gm/dl    RDW-CV 15.2 (H) 11.5 - 14.5 %    RDW-SD 53.1 (H) 35.0 - 45.0 fL    Platelets 057 604 - 824 thou/mm3    MPV 10.0 9.4 - 12.4 fL    Seg Neutrophils 49.4 %    Lymphocytes 29.4 %    Monocytes 20.8 %    Eosinophils 0.0 %    Basophils 0.2 %    Immature Granulocytes 0.2 %    Segs Absolute 2.3 1.8 - 7.7 thou/mm3    Lymphocytes Absolute 1.4 1.0 - 4.8 thou/mm3    Monocytes Absolute 1.0 0.4 - 1.3 thou/mm3    Eosinophils Absolute 0.0 0.0 - 0.4 thou/mm3    Basophils Absolute 0.0 0.0 - 0.1 thou/mm3    Immature Grans (Abs) 0.01 0.00 - 0.07 thou/mm3    nRBC 0 /100 wbc   Glomerular Filtration Rate, Estimated   Result Value Ref Range    Est, Glom Filt Rate >60 >60 ml/min/1.73m2   Anion Gap   Result Value Ref Range    Anion Gap 13.0 8.0 - 16.0 meq/L   Basic Metabolic Panel   Result Value Ref Range    Sodium 134 (L) 135 - 145 meq/L    Potassium 4.1 3.5 - 5.2 meq/L    Chloride 97 (L) 98 - 111 meq/L    CO2 27 23 - 33 meq/L    Glucose 89 70 - 108 mg/dL    BUN 10 7 - 22 mg/dL    Creatinine 0.5 0.4 - 1.2 mg/dL    Calcium 9.3 8.5 - 10.5 mg/dL   Anion Gap   Result Value Ref Range    Anion Gap 10.0 8.0 - 16.0 meq/L   Glomerular Filtration Rate, Estimated   Result Value Ref Range    Est, Glom Filt Rate >60 >60 ml/min/1.73m2   EKG 12 Lead   Result Value Ref Range    Ventricular Rate 77 BPM    Atrial Rate 77 BPM    P-R Interval 192 ms    QRS Duration 58 ms    Q-T Interval 330 ms    QTc Calculation (Bazett) 373 ms    P Axis 3 degrees    R Axis 27 degrees    T Axis 32 degrees       Diet:  ADULT DIET; Regular    Activity:  Activity as tolerated. PT/OT. Follow-up:  with SNF physician.      Disposition: SNF    Condition: Stable      Time Spent: 60 minutes    Electronically signed by Luis DOCKERY, RN, AG-ACNP Student on 11/15/2022 at 1:25 PM    Discharging Hospitalist

## 2022-11-15 NOTE — CARE COORDINATION
11/15/22, 8:52 AM EST    DISCHARGE PLANNING EVALUATION    Call from Mariela Catalan with [facility], precert is approved, SW did notify provider. 11/15/22, 11:03 AM EST    Patient goals/plan/ treatment preferences discussed by  and . Patient goals/plan/ treatment preferences reviewed with patient/ family. Patient/ family verbalize understanding of discharge plan and are in agreement with goal/plan/treatment preferences. Understanding was demonstrated using the teach back method. AVS provided by RN at time of discharge, which includes all necessary medical information pertaining to the patients current course of illness, treatment, post-discharge goals of care, and treatment preferences. Services At/After Discharge: [facility]       IMM Letter  Observation Status Letter date given[de-identified] 11/11/22  Observation Status Letter time given[de-identified] 1927  Observation Status Letter given to Patient/Family/Significant other/Guardian/POA/by[de-identified] Pt. Access     Nursing reports daughter asking for a call from [address] did call daughter Cassie Hopson, discussed transport, daughter will transport around 2pm. Call to Mariela Catalan with [facility], updated on transport time. Nurse will call report and fax orders.

## 2022-11-21 ENCOUNTER — TELEPHONE (OUTPATIENT)
Dept: FAMILY MEDICINE CLINIC | Age: 87
End: 2022-11-21

## 2022-11-21 NOTE — TELEPHONE ENCOUNTER
Received fax from [employer] with [facility] at [address] was sent to [facility]  on 11/11/22 with covid the [facility] for rehab, she is  back at [address] now    Pt doing well some confusion but is getting acclimated to her surroundings again  Tylenol is helping with the hip pain, the pain is random but controlled with the tylenol

## 2022-11-22 DIAGNOSIS — K21.9 GASTROESOPHAGEAL REFLUX DISEASE, UNSPECIFIED WHETHER ESOPHAGITIS PRESENT: Primary | ICD-10-CM

## 2022-11-22 RX ORDER — FAMOTIDINE 40 MG/1
40 TABLET, FILM COATED ORAL EVERY EVENING
Qty: 90 TABLET | Refills: 3 | Status: SHIPPED | OUTPATIENT
Start: 2022-11-22

## 2022-12-07 RX ORDER — CLOPIDOGREL BISULFATE 75 MG/1
TABLET ORAL
Qty: 90 TABLET | Refills: 0 | OUTPATIENT
Start: 2022-12-07

## 2023-01-20 ENCOUNTER — TELEPHONE (OUTPATIENT)
Dept: FAMILY MEDICINE CLINIC | Age: 88
End: 2023-01-20

## 2023-01-20 DIAGNOSIS — H61.23 CERUMINOSIS, BILATERAL: Primary | ICD-10-CM

## 2023-01-20 NOTE — TELEPHONE ENCOUNTER
Licha Segovia ( on [place]) states that she would  like to go to the ENT, states flushing doesn't do well.  They don't have a preference on ENT's

## 2023-01-20 NOTE — TELEPHONE ENCOUNTER
Dejon (daughter on [employer]IP[employer]) calling stating that she believes that the patients ears are full of wax and would like her ears looked at and possibly flushed and also a referral placed to an ENT within [facility]

## 2023-01-20 NOTE — TELEPHONE ENCOUNTER
Diagnosis Orders   1.  Ceruminosis, bilateral  Sobeida Gutierrez MD, Otolaryngology, [facility]

## 2023-01-23 NOTE — TELEPHONE ENCOUNTER
Daughter informed  ENT has contacted them  Future Appointments   Date Time Provider Bj Ahuja   2/7/2023  2:00 PM Jf Durant, [address] [place] [address]   3/30/2023  9:30 AM RED Gregorio [facility] [place] [facility]

## 2023-02-06 DIAGNOSIS — H40.1110 PRIMARY OPEN ANGLE GLAUCOMA OF RIGHT EYE, UNSPECIFIED GLAUCOMA STAGE: Primary | ICD-10-CM

## 2023-02-06 PROBLEM — R53.81 DEBILITY: Status: RESOLVED | Noted: 2022-11-11 | Resolved: 2023-02-06

## 2023-02-06 PROBLEM — R54 ADVANCED AGE: Status: RESOLVED | Noted: 2022-11-14 | Resolved: 2023-02-06

## 2023-02-06 PROBLEM — U07.1 COVID-19: Status: RESOLVED | Noted: 2022-11-14 | Resolved: 2023-02-06

## 2023-02-06 RX ORDER — TIMOLOL MALEATE 5 MG/ML
1 SOLUTION/ DROPS OPHTHALMIC 2 TIMES DAILY
Qty: 15 ML | Refills: 5 | Status: SHIPPED | OUTPATIENT
Start: 2023-02-06

## 2023-02-06 NOTE — PROGRESS NOTES
Chief Complaint   Patient presents with    Follow-up     Chronic issues as noted below       History obtained from daughter and the patient. SUBJECTIVE:  Philipp Jansen is a 80 y.o. female that presents today for     -Anemia/B12 def PRIOR VISIT:  Noted on labs  Declined aggressive w/u previously  Is DNR/CC  On b12 now, and labs had been improving  However, had labs done last wk that showed hb down to 8.8, was 11.6 in [facility] 2022  Denies bleeding, melena or hematochezia  Does have inc fatigue the last 4 wks  No CP/SOB     UPDATE PRIOR VISIT:   Here for f/u  Dx with new GORDO 4 wks ago  Started on iron  D/w family about further w/u, including EGD/[place]. They declined based on pts age and other comorbid conditions  Is tolerating iron well  No bleeding, melena or hematochezia  No abd pain     UPDATE LAST VISIT:   On iron  F/u labs improving, last H/H 10.9  No melena or hematochezia  D/w family about further w/u, including EGD/[place]. They declined based on pts age and other comorbid conditions    UPDATE TODAY:   On iron  Due for labs  No melena or hematochezia  D/w family about further w/u, including EGD/[place]. They declined based on pts age and other comorbid conditions     -Hyponatremia:   Mild on previous labs  Most recent labs ok  Due for f/u      -CAD:    HPI:  Follows with cardio  No CP/sOB  Due for labs  On DAPT, BB, ARB and statin    History of myocardial infarction? Yes    History of heart failure? No  Current symptoms of angina? No   Patient compliant with therapy? Yes  Tobacco use? No      Antiplatelet therapy? Yes    Beta-blocker therapy?   Yes   ACE/ARB therapy - Yes      -HTN:    HPI:    Taking meds as prescribed ?: yes  Tolerating well ?: yes  Side Effects ?: denies  BP at home ?: typically <140/90  Working on TLCS ?: yes  Chest Pain/SOB/Palpitations? denies    BP Readings from Last 3 Encounters:   02/07/23 110/60   11/15/22 (!) 154/75   10/05/22 132/66       -HLD:    HPI:    Taking meds as prescribed ?: yes  Tolerating well ?: yes  Side Effects ?: denies  Muscle Pain?: denies  Working on TLCS ?: yes      -GERD:    HPI:  Gets on and off nausea with it, but controlled with meds    Taking meds as prescribed ?: yes  Tolerating well ?: yes  Side Effects ?: deneis  Dysphagia ?: deneis  Odynophagia ?: denies  Melena ?: denies  Working on TLCS ?: yes      -Fibro:  On ro  Works well enough  Pain controlled      -Glaucoma:  Mostly blind both eyes  On timolol  Seen opthoAra, in the past  But is just f/u prn now.       -Dizziness LAST VISIT  Worse a few months ago  Doing better now  Saw cardio, felt not related to cardio  Saw audio, did not do w/u d/t pt not being able to get done  They recommended vestib rehab, however, pt doing better  Having 1 episode every 2 wks or so  Doesn't last long  ? Vertigo    UPDATE TODAY:   Doing better  No recurrence yet.         Age/Gender Health Maintenance    Lipid - ; LDL 66; HDL 45; TG 95 (AUG 2022)    Lab Results   Component Value Date    CHOL 160 04/06/2021    CHOL 154 04/01/2017    CHOL 180 08/31/2015     Lab Results   Component Value Date    TRIG 102 04/06/2021    TRIG 105 04/01/2017    TRIG 91 08/31/2015     Lab Results   Component Value Date    HDL 65 04/06/2021    HDL 67 04/01/2017    HDL 80 08/31/2015     Lab Results   Component Value Date    LDLCALC 75 04/06/2021    LDLCALC 66 04/01/2017    LDLCALC 82 08/31/2015     Lab Results   Component Value Date    VLDL 20 04/06/2021     No results found for: CHOLHDLRATIO      DM Screen - 75 (APR 2021)    Lab Results   Component Value Date/Time    GLUCOSE 89 11/14/2022 05:31 AM    GLUCOSE 77 11/14/2018 03:03 PM       Colon Cancer Screening - aged out  Lung Cancer Screening - aged out    Tetanus - to get at pharmacy per medicare rules  Influenza Vaccine - UTD FALL 2022  Pneumonia Vaccine - UTD  Zoster - to get at pharmacy per medicare rules     Breast Cancer Screening - aged out  Cervical Cancer Screening - aged out  Osteoporosis Screening - declines JAN 2021      Current Outpatient Medications   Medication Sig Dispense Refill    acetaminophen (TYLENOL) 325 MG tablet Take 650 mg by mouth every 6 hours as needed for Pain      timolol (TIMOPTIC) 0.5 % ophthalmic solution Place 1 drop into both eyes 2 times daily 15 mL 5    famotidine (PEPCID) 40 MG tablet Take 1 tablet by mouth every evening 90 tablet 3    miconazole (MICOTIN) 2 % powder Apply topically 2 times daily. 45 g 1    zinc sulfate (ZINCATE) 220 (50 Zn) MG capsule Take 1 capsule by mouth daily 30 capsule 3    ascorbic acid (VITAMIN C) 1000 MG tablet Take 1 tablet by mouth daily 30 tablet 3    metoprolol tartrate (LOPRESSOR) 25 MG tablet Take 12.5 mg by mouth at bedtime Take 12.5mg metoprolol tartrate PO QHS for HTN; total PM dosing to =37.5 mg of metoprolol tartrate for HTN every evening      clopidogrel (PLAVIX) 75 MG tablet Take 1 tablet by mouth once daily 90 tablet 3    atorvastatin (LIPITOR) 10 MG tablet Take 1 tablet by mouth once daily 90 tablet 3    polyethylene glycol (GLYCOLAX) 17 GM/SCOOP powder Take 17 g by mouth daily 1530 g 1    ferrous sulfate (IRON 325) 325 (65 Fe) MG tablet Take 1 tablet by mouth in the morning and 1 tablet in the evening. Take with meals.  180 tablet 1    pantoprazole (PROTONIX) 40 MG tablet Take 1 tab daily 90 tablet 3    valsartan (DIOVAN) 320 MG tablet Take 1 tablet by mouth once daily 90 tablet 3    amLODIPine (NORVASC) 5 MG tablet Take 1 tablet by mouth once daily 90 tablet 3    mirtazapine (REMERON) 15 MG tablet TAKE 1/2 (ONE-HALF) TABLET BY MOUTH NIGHTLY 45 tablet 3    meclizine (ANTIVERT) 12.5 MG tablet Take 1 tablet by mouth 3 times daily as needed for Dizziness 60 tablet 1    cyanocobalamin ([employer] VITAMIN B12) 1000 MCG tablet Take 1 tablet by mouth daily 90 tablet 1    SENNA CO by Combination route      carbamide peroxide (DEBROX) 6.5 % otic solution 5 drops as needed      sennosides-docusate sodium (SENOKOT-S) 8.6-50 MG tablet Take 1 tablet by mouth daily      Cholecalciferol (VITAMIN D3) 125 MCG (5000 UT) TABS Take 1 tablet by mouth daily      ondansetron (ZOFRAN) 4 MG tablet Take 4 mg by mouth every 8 hours as needed for Nausea or Vomiting      nitroGLYCERIN (NITROSTAT) 0.4 MG SL tablet Place 1 tablet under the tongue every 5 minutes as needed for Chest pain 25 tablet 3    Calcium Citrate-Vitamin D (CALCIUM CITRATE + PO) Take 600 mg by mouth 2 times daily      aspirin 81 MG chewable tablet Take 1 tablet by mouth daily. 30 tablet 3    Multiple Vitamins-Minerals (OCUVITE) TABS oral tablet Take 1 tablet by mouth daily. Flaxseed, Linseed, (FLAX SEED OIL) 1000 MG CAPS Take 1 capsule by mouth 2 times daily. zinc oxide 40 % PSTE paste Apply to rash as needed (Patient not taking: Reported on 2/7/2023) 1 each 0    metoprolol tartrate (LOPRESSOR) 25 MG tablet Take 25 mg by mouth nightly Take 25 mg metoprolol tartrate PO QHS for HTN (Patient not taking: Reported on 2/7/2023)      gabapentin (NEURONTIN) 400 MG capsule TAKE 1 CAPSULE BY MOUTH THREE TIMES DAILY 90 capsule 11    lidocaine-prilocaine (EMLA) 2.5-2.5 % cream Apply topically as needed for Pain Apply topically as needed. (Patient not taking: Reported on 2/7/2023)       No current facility-administered medications for this visit. No orders of the defined types were placed in this encounter. All medications reviewed and reconciled, including OTC and herbal medications. Updated list given to patient. Patient Active Problem List    Diagnosis Date Noted    Iron deficiency anemia 08/15/2022     Priority: Medium    ASHD (arteriosclerotic heart disease)     Constipation     DDD (degenerative disc disease), lumbar     Dyslipidemia     Glaucoma     History of vertebral compression fracture; L1     Hypertension, essential     S/P PTCA: 2/2/2015: Stenting mid-LAD using Xience Alpine 2.75 X 28 mm, post-dilated to 3.0 mm distally and 3.18 mm proximally.  02/02/2015 2/2/2015: Stenting mid-LAD using Xience Alpine 2.75 X 28 mm, post-dilated to 3.0 mm distally and 3.18 mm proximally. Dr. Wanda Kevin      Tinnitus of both ears 11/19/2013    Status post tympanoplasty 06/24/2013    VMR (vasomotor rhinitis) 10/09/2012    Hyponatremia        Past Medical History:   Diagnosis Date    ASHD (arteriosclerotic heart disease)     Constipation     DDD (degenerative disc disease), lumbar     Dyslipidemia     Fibromyalgia     GERD (gastroesophageal reflux disease)     Glaucoma     History of vertebral compression fracture; L1     Hypertension, essential     Nausea & vomiting     Osteoporosis     S/P PTCA: 2/2/2015: Stenting mid-LAD using Xience Alpine 2.75 X 28 mm, post-dilated to 3.0 mm distally and 3.18 mm proximally. 02/02/2015 2/2/2015: Stenting mid-LAD using Xience Alpine 2.75 X 28 mm, post-dilated to 3.0 mm distally and 3.18 mm proximally.  Dr. Wanda Kevin    Status post tympanoplasty 6/24/2013    Tinnitus of both ears 11/19/2013       Past Surgical History:   Procedure Laterality Date    APPENDECTOMY  1940    CARDIAC CATHETERIZATION  2015    stent x1    COLON SURGERY  1994    COLONOSCOPY  07/19/2005    EYE SURGERY  2009    left corneal transplant    EYE SURGERY  2008    right corneal transplant    EYE SURGERY      bilateral cataract    [facility] PAIN BLOCK Left 12/14/2018    Synvisc ONE LEFT knee performed by Rita Silveira MD at [address] [facility] ([address] [place] [address])  1969    hysterectomy    KYPHOSIS SURGERY  2017    L-1 compression fracture    MIDDLE EAR SURGERY  2005    right tympnaoplasty by dr gipson    TYMPANOPLASTY Right 05/03/2013       Allergies   Allergen Reactions    Bactrim [Sulfamethoxazole-Trimethoprim] Other (See Comments)    Codeine     Diazepam     Lisinopril     Simvastatin      Muscle aches    Valtrex [Valacyclovir Hcl]     Sulfa Antibiotics Rash       Social History     Tobacco Use    Smoking status: Never    Smokeless tobacco: Never   Substance Use Topics    Alcohol use: No       Family History   Problem Relation Age of Onset    Arthritis Mother     Heart Disease Father     Heart Disease Sister     Heart Disease Brother     Heart Disease Sister     Heart Disease Sister        I have reviewed the patient's past medical history, past surgical history, allergies, medications, social and family history and I have made updates where appropriate. Review of Systems  Positive responses are highlighted in bold    Constitutional:  Fever, Chills, Night Sweats, Fatigue, Unexpected changes in weight  HENT:  Ear pain, Tinnitus, Nosebleeds, Trouble swallowing, Hearing loss, Sore throat  Cardiovascular:  Chest Pain, Palpitations, Orthopnea, Paroxysmal Nocturnal Dyspnea  Respiratory:  Cough, Wheezing, Shortness of breath, Chest tightness, Apnea  Gastrointestinal:  Nausea, Vomiting, Diarrhea, Constipation, Heartburn, Blood in stool  Genitourinary:  Difficulty or painful urination, Flank pain, Change in frequency, Urgency  Skin:  Color change, Rash, Itching, Wound  Musculoskeletal:  Joint pain, Back pain, Gait problems, Joint swelling, Myalgias  Neurological:  Dizziness, Headaches, Presyncope, Numbness, Seizures, Tremors  Endocrine:  Heat Intolerance, Cold Intolerance, Polydipsia, Polyphagia, Polyuria      PHYSICAL EXAM:  Vitals:    02/07/23 1402   BP: 110/60   Pulse: 76   Resp: 16   Temp: 98.2 °F (36.8 °C)   SpO2: 96%   Weight: 133 lb (60.3 kg)   Height: 4' 9\" (1.448 m)       Body mass index is 28.78 kg/m².        VS Reviewed  General Appearance: A&O to person only, No acute distress,well developed and well- nourished  Eyes: pupils equal, round, and reactive to light, extraocular eye movements intact, conjunctivae and eye lids without erythema  ENT: external ear and ear canal clear bilaterally, TMs intact and regular, nose without deformity, nasal mucosa and turbinates normal without polyps, oropharynx normal, dentition is normal for age  Neck: supple and non-tender without mass, no thyromegaly or thyroid nodules, no cervical lymphadenopathy  Pulmonary/Chest: clear to auscultation bilaterally- no wheezes, rales or rhonchi, normal air movement, no respiratory distress or retractions  Cardiovascular: S1 and S2 auscultated w/ RRR. No murmurs, rubs, clicks, or gallops, distal pulses intact. Abdomen: soft, non-tender, non-distended, bowel sounds physiologic,  no rebound or guarding, no masses or hernias noted. Liver and spleen without enlargement. Extremities: no cyanosis, clubbing or edema of the lower extremities. +2 PT/DP bilaterally. Musculoskeletal: No joint swelling or gross deformity   Psych: Affect appropriate. Mood normal. Thought process is normal without evidence of depression or psychosis. Good insight and appropriate interaction. Cognition and memory appear to be impaored. Skin: warm and dry, no rash or erythema       Lab Results   Component Value Date    WBC 4.7 (L) 11/13/2022    HGB 12.6 11/13/2022    HCT 39.1 11/13/2022    MCV 96.5 11/13/2022     11/13/2022     Lab Results   Component Value Date/Time     11/14/2022 05:31 AM    K 4.1 11/14/2022 05:31 AM    CL 97 11/14/2022 05:31 AM    CO2 27 11/14/2022 05:31 AM    BUN 10 11/14/2022 05:31 AM    CREATININE 0.5 11/14/2022 05:31 AM    GLUCOSE 89 11/14/2022 05:31 AM    GLUCOSE 77 11/14/2018 03:03 PM    CALCIUM 9.3 11/14/2022 05:31 AM        ASSESSMENT & PLAN  1. Iron deficiency anemia, unspecified iron deficiency anemia type    Improving  Con't iron  Discussed again with pt and daughter further w/u  I agree that based on her age, fraility, that a w/u wouldn't yield much helpful information  They would not intervene on anything anyway  They are aware we could be missing a cancer etc and are accepting  Con't iron  F/u labs ordered    - CBC with Auto Differential; Future  - Comprehensive Metabolic Panel; Future  - Ferritin; Future  - Iron; Future  - Iron Binding Capacity; Future  - Vitamin B12; Future    2.  B12 deficiency    Con't b12  Check levels    - CBC with Auto Differential; Future  - Comprehensive Metabolic Panel; Future  - Ferritin; Future  - Iron; Future  - Iron Binding Capacity; Future  - Vitamin B12; Future    3. Hyponatremia    Improved on previous labs  Will monitor    - CBC with Auto Differential; Future  - Comprehensive Metabolic Panel; Future  - Ferritin; Future  - Iron; Future  - Iron Binding Capacity; Future  - Vitamin B12; Future    4. ASHD (arteriosclerotic heart disease)    Stable  con't current meds, DAPT, Toprol, Valsartan and statin  Labs UTD  F/u cardio as well    - CBC with Auto Differential; Future  - Comprehensive Metabolic Panel; Future  - Ferritin; Future  - Iron; Future  - Iron Binding Capacity; Future  - Vitamin B12; Future    5. Hypertension, essential    At goal  con't meds  Labs UTD    - CBC with Auto Differential; Future  - Comprehensive Metabolic Panel; Future  - Ferritin; Future  - Iron; Future  - Iron Binding Capacity; Future  - Vitamin B12; Future    6. Dyslipidemia    Stable  Con't Lipitor    7. Gastroesophageal reflux disease, unspecified whether esophagitis present    Stable  con't pantoprazole    8. Fibromyalgia    Stable  Con't ro    9. Glaucoma, unspecified glaucoma type, unspecified laterality    Stable  con't timolol drops. 10. Vertigo    Improved from last visit  Will monitor      DISPOSITION    Return in about 6 months (around 8/7/2023) for follow-up on chronic medical conditions, sooner as needed. Darien Austin released without restrictions. Future Appointments   Date Time Provider Bj Ahuja   3/30/2023  9:30 AM Ana Sidhu P - [facility]   8/9/2023  2:00 PM [address]     PATIENT COUNSELING    Barriers to learning and self management: [place], daughter present. Discussed use, benefit, and side effects of prescribed medications. Barriers to medication compliance addressed. All patient questions answered. Pt voiced understanding. Electronically signed by Blane Unger DO on 2/7/2023 at 2:29 PM

## 2023-02-06 NOTE — PATIENT INSTRUCTIONS
LAB INSTRUCTIONS:    Please complete labs within 2 week(s). Please fast for 8 hours prior to lab collection. The clinic will call you within 1 week of collection. If you have not heard from us within that amount of time, please call us at [phone].

## 2023-02-07 ENCOUNTER — OFFICE VISIT (OUTPATIENT)
Dept: FAMILY MEDICINE CLINIC | Age: 88
End: 2023-02-07
Payer: MEDICARE

## 2023-02-07 VITALS
OXYGEN SATURATION: 96 % | HEIGHT: 57 IN | SYSTOLIC BLOOD PRESSURE: 110 MMHG | HEART RATE: 76 BPM | TEMPERATURE: 98.2 F | DIASTOLIC BLOOD PRESSURE: 60 MMHG | RESPIRATION RATE: 16 BRPM | BODY MASS INDEX: 28.69 KG/M2 | WEIGHT: 133 LBS

## 2023-02-07 DIAGNOSIS — M79.7 FIBROMYALGIA: ICD-10-CM

## 2023-02-07 DIAGNOSIS — E87.1 HYPONATREMIA: ICD-10-CM

## 2023-02-07 DIAGNOSIS — H40.9 GLAUCOMA, UNSPECIFIED GLAUCOMA TYPE, UNSPECIFIED LATERALITY: ICD-10-CM

## 2023-02-07 DIAGNOSIS — E78.5 DYSLIPIDEMIA: ICD-10-CM

## 2023-02-07 DIAGNOSIS — I10 HYPERTENSION, ESSENTIAL: ICD-10-CM

## 2023-02-07 DIAGNOSIS — I25.10 ASHD (ARTERIOSCLEROTIC HEART DISEASE): ICD-10-CM

## 2023-02-07 DIAGNOSIS — E53.8 B12 DEFICIENCY: ICD-10-CM

## 2023-02-07 DIAGNOSIS — D50.9 IRON DEFICIENCY ANEMIA, UNSPECIFIED IRON DEFICIENCY ANEMIA TYPE: Primary | ICD-10-CM

## 2023-02-07 DIAGNOSIS — R42 VERTIGO: ICD-10-CM

## 2023-02-07 DIAGNOSIS — K21.9 GASTROESOPHAGEAL REFLUX DISEASE, UNSPECIFIED WHETHER ESOPHAGITIS PRESENT: ICD-10-CM

## 2023-02-07 PROCEDURE — G8417 CALC BMI ABV UP PARAM F/U: HCPCS | Performed by: FAMILY MEDICINE

## 2023-02-07 PROCEDURE — 1123F ACP DISCUSS/DSCN MKR DOCD: CPT | Performed by: FAMILY MEDICINE

## 2023-02-07 PROCEDURE — 1090F PRES/ABSN URINE INCON ASSESS: CPT | Performed by: FAMILY MEDICINE

## 2023-02-07 PROCEDURE — G8427 DOCREV CUR MEDS BY ELIG CLIN: HCPCS | Performed by: FAMILY MEDICINE

## 2023-02-07 PROCEDURE — 99214 OFFICE O/P EST MOD 30 MIN: CPT | Performed by: FAMILY MEDICINE

## 2023-02-07 PROCEDURE — G8484 FLU IMMUNIZE NO ADMIN: HCPCS | Performed by: FAMILY MEDICINE

## 2023-02-07 PROCEDURE — 1036F TOBACCO NON-USER: CPT | Performed by: FAMILY MEDICINE

## 2023-02-07 RX ORDER — ACETAMINOPHEN 325 MG/1
650 TABLET ORAL EVERY 6 HOURS PRN
COMMUNITY

## 2023-02-07 SDOH — ECONOMIC STABILITY: FOOD INSECURITY: WITHIN THE PAST 12 MONTHS, YOU WORRIED THAT YOUR FOOD WOULD RUN OUT BEFORE YOU GOT MONEY TO BUY MORE.: NEVER TRUE

## 2023-02-07 SDOH — ECONOMIC STABILITY: FOOD INSECURITY: WITHIN THE PAST 12 MONTHS, THE FOOD YOU BOUGHT JUST DIDN'T LAST AND YOU DIDN'T HAVE MONEY TO GET MORE.: NEVER TRUE

## 2023-02-07 SDOH — ECONOMIC STABILITY: INCOME INSECURITY: HOW HARD IS IT FOR YOU TO PAY FOR THE VERY BASICS LIKE FOOD, HOUSING, MEDICAL CARE, AND HEATING?: NOT HARD AT ALL

## 2023-02-07 SDOH — ECONOMIC STABILITY: HOUSING INSECURITY
IN THE LAST 12 MONTHS, WAS THERE A TIME WHEN YOU DID NOT HAVE A STEADY PLACE TO SLEEP OR SLEPT IN A SHELTER (INCLUDING NOW)?: NO

## 2023-02-07 ASSESSMENT — PATIENT HEALTH QUESTIONNAIRE - PHQ9
SUM OF ALL RESPONSES TO PHQ QUESTIONS 1-9: 0
SUM OF ALL RESPONSES TO PHQ9 QUESTIONS 1 & 2: 0
1. LITTLE INTEREST OR PLEASURE IN DOING THINGS: 0
2. FEELING DOWN, DEPRESSED OR HOPELESS: 0
SUM OF ALL RESPONSES TO PHQ QUESTIONS 1-9: 0

## 2023-02-11 DIAGNOSIS — D50.9 IRON DEFICIENCY ANEMIA, UNSPECIFIED IRON DEFICIENCY ANEMIA TYPE: ICD-10-CM

## 2023-02-13 RX ORDER — CLOPIDOGREL BISULFATE 75 MG/1
TABLET ORAL
Qty: 90 TABLET | Refills: 0 | OUTPATIENT
Start: 2023-02-13

## 2023-02-13 RX ORDER — PNV NO.95/FERROUS FUM/FOLIC AC 28MG-0.8MG
TABLET ORAL
Qty: 180 TABLET | Refills: 0 | Status: SHIPPED | OUTPATIENT
Start: 2023-02-13

## 2023-02-17 LAB
ALBUMIN SERPL-MCNC: 3.3 G/DL
ALP BLD-CCNC: 66 U/L
ALT SERPL-CCNC: 9 U/L
ANION GAP SERPL CALCULATED.3IONS-SCNC: NORMAL MMOL/L
AST SERPL-CCNC: 15 U/L
BASOPHILS ABSOLUTE: ABNORMAL
BASOPHILS RELATIVE PERCENT: ABNORMAL
BILIRUB SERPL-MCNC: 0.6 MG/DL (ref 0.1–1.4)
BUN BLDV-MCNC: 13 MG/DL
CALCIUM SERPL-MCNC: 8.6 MG/DL
CHLORIDE BLD-SCNC: 102 MMOL/L
CO2: 29 MMOL/L
CREAT SERPL-MCNC: 0.5 MG/DL
EGFR: 115
EOSINOPHILS ABSOLUTE: ABNORMAL
EOSINOPHILS RELATIVE PERCENT: ABNORMAL
FERRITIN: 31 NG/ML (ref 9–150)
GLUCOSE BLD-MCNC: 91 MG/DL
HCT VFR BLD CALC: 34.3 % (ref 36–46)
HEMOGLOBIN: 11.4 G/DL (ref 12–16)
IRON SATURATION: 20
IRON: 64
LYMPHOCYTES ABSOLUTE: ABNORMAL
LYMPHOCYTES RELATIVE PERCENT: ABNORMAL
MCH RBC QN AUTO: ABNORMAL PG
MCHC RBC AUTO-ENTMCNC: ABNORMAL G/DL
MCV RBC AUTO: ABNORMAL FL
MONOCYTES ABSOLUTE: ABNORMAL
MONOCYTES RELATIVE PERCENT: ABNORMAL
NEUTROPHILS ABSOLUTE: ABNORMAL
NEUTROPHILS RELATIVE PERCENT: ABNORMAL
PLATELET # BLD: 198 K/ΜL
PMV BLD AUTO: ABNORMAL FL
POTASSIUM SERPL-SCNC: 4.6 MMOL/L
RBC # BLD: 3.56 10^6/ΜL
SODIUM BLD-SCNC: 136 MMOL/L
TOTAL IRON BINDING CAPACITY: 318
TOTAL PROTEIN: 5.8
VITAMIN B-12: 1500
WBC # BLD: 4.9 10^3/ML

## 2023-02-17 RX ORDER — CLOPIDOGREL BISULFATE 75 MG/1
TABLET ORAL
Qty: 90 TABLET | Refills: 0 | OUTPATIENT
Start: 2023-02-17

## 2023-02-21 DIAGNOSIS — E53.8 B12 DEFICIENCY: ICD-10-CM

## 2023-02-21 DIAGNOSIS — D50.9 IRON DEFICIENCY ANEMIA, UNSPECIFIED IRON DEFICIENCY ANEMIA TYPE: Primary | ICD-10-CM

## 2023-02-22 ENCOUNTER — TELEPHONE (OUTPATIENT)
Dept: FAMILY MEDICINE CLINIC | Age: 88
End: 2023-02-22

## 2023-02-22 NOTE — TELEPHONE ENCOUNTER
Patient has been informed and voiced understanding   Information faxed to ATTN: [facility]

## 2023-02-22 NOTE — TELEPHONE ENCOUNTER
----- Message from Jeff Rodriguez, DO sent at 2/21/2023  7:05 PM EST -----  Please let family know that labs look sig improved  Ok to stop iron and b12  Repeat labs in 3 months, fasting  Let me know if questions, thanks!

## 2023-02-22 NOTE — TELEPHONE ENCOUNTER
----- Message from Tessa Adorno, DO sent at 2/21/2023  7:05 PM EST -----  Please let family know that labs look sig improved  Ok to stop iron and b12  Repeat labs in 3 months, fasting  Let me know if questions, thanks!

## 2023-02-22 NOTE — TELEPHONE ENCOUNTER
----- Message from Jb Quintanilla DO sent at 2/21/2023  7:05 PM EST -----  Please let family know that labs look sig improved  Ok to stop iron and b12  Repeat labs in 3 months, fasting  Let me know if questions, thanks!

## 2023-02-27 ENCOUNTER — TELEPHONE (OUTPATIENT)
Dept: CARDIOLOGY CLINIC | Age: 88
End: 2023-02-27

## 2023-02-27 DIAGNOSIS — I25.10 CORONARY ARTERY DISEASE INVOLVING NATIVE CORONARY ARTERY OF NATIVE HEART WITHOUT ANGINA PECTORIS: Primary | ICD-10-CM

## 2023-02-27 RX ORDER — CLOPIDOGREL BISULFATE 75 MG/1
TABLET ORAL
Qty: 90 TABLET | Refills: 3 | Status: SHIPPED | OUTPATIENT
Start: 2023-02-27

## 2023-02-27 NOTE — TELEPHONE ENCOUNTER
Daughter Neli Kurtz called   Pt was made PRN at her last OV on 5/19/22 and is needing a refill on plavix      Dr. Cheyanne Viramontes are you able to fill for the patient or do you want [facility] to have her come in for an appt? ??       Please let chuy Kurtz know

## 2023-02-27 NOTE — TELEPHONE ENCOUNTER
I'm fine to RF if she's stable. She was doing well last time I Saw her    RF sent  Please update daughter/  Let me know if questions, thanks! Diagnosis Orders   1.  Coronary artery disease involving native coronary artery of native heart without angina pectoris  clopidogrel (PLAVIX) 75 MG tablet

## 2023-03-02 DIAGNOSIS — F32.A DEPRESSION, UNSPECIFIED DEPRESSION TYPE: ICD-10-CM

## 2023-03-02 RX ORDER — MIRTAZAPINE 15 MG/1
TABLET, FILM COATED ORAL
Qty: 45 TABLET | Refills: 3 | Status: SHIPPED | OUTPATIENT
Start: 2023-03-02

## 2023-03-30 ENCOUNTER — OFFICE VISIT (OUTPATIENT)
Dept: ENT CLINIC | Age: 88
End: 2023-03-30
Payer: MEDICARE

## 2023-03-30 VITALS
TEMPERATURE: 97.3 F | BODY MASS INDEX: 28.78 KG/M2 | RESPIRATION RATE: 18 BRPM | DIASTOLIC BLOOD PRESSURE: 70 MMHG | SYSTOLIC BLOOD PRESSURE: 112 MMHG | HEART RATE: 62 BPM | HEIGHT: 57 IN | OXYGEN SATURATION: 94 %

## 2023-03-30 DIAGNOSIS — H61.23 HEARING LOSS DUE TO CERUMEN IMPACTION, BILATERAL: Primary | ICD-10-CM

## 2023-03-30 DIAGNOSIS — H93.13 TINNITUS OF BOTH EARS: ICD-10-CM

## 2023-03-30 DIAGNOSIS — Z97.4 WEARS HEARING AID IN BOTH EARS: ICD-10-CM

## 2023-03-30 PROCEDURE — G8427 DOCREV CUR MEDS BY ELIG CLIN: HCPCS | Performed by: PHYSICIAN ASSISTANT

## 2023-03-30 PROCEDURE — G8484 FLU IMMUNIZE NO ADMIN: HCPCS | Performed by: PHYSICIAN ASSISTANT

## 2023-03-30 PROCEDURE — 99202 OFFICE O/P NEW SF 15 MIN: CPT | Performed by: PHYSICIAN ASSISTANT

## 2023-03-30 PROCEDURE — G8417 CALC BMI ABV UP PARAM F/U: HCPCS | Performed by: PHYSICIAN ASSISTANT

## 2023-03-30 PROCEDURE — 1123F ACP DISCUSS/DSCN MKR DOCD: CPT | Performed by: PHYSICIAN ASSISTANT

## 2023-03-30 PROCEDURE — 1090F PRES/ABSN URINE INCON ASSESS: CPT | Performed by: PHYSICIAN ASSISTANT

## 2023-03-30 PROCEDURE — 1036F TOBACCO NON-USER: CPT | Performed by: PHYSICIAN ASSISTANT

## 2023-03-30 PROCEDURE — 69210 REMOVE IMPACTED EAR WAX UNI: CPT | Performed by: PHYSICIAN ASSISTANT

## 2023-03-30 NOTE — PROGRESS NOTES
MG tablet Take 4 mg by mouth every 8 hours as needed for Nausea or Vomiting      nitroGLYCERIN (NITROSTAT) 0.4 MG SL tablet Place 1 tablet under the tongue every 5 minutes as needed for Chest pain 25 tablet 3    Calcium Citrate-Vitamin D (CALCIUM CITRATE + PO) Take 600 mg by mouth 2 times daily      aspirin 81 MG chewable tablet Take 1 tablet by mouth daily. 30 tablet 3    Multiple Vitamins-Minerals (OCUVITE) TABS oral tablet Take 1 tablet by mouth daily. Flaxseed, Linseed, (FLAX SEED OIL) 1000 MG CAPS Take 1 capsule by mouth 2 times daily. gabapentin (NEURONTIN) 400 MG capsule TAKE 1 CAPSULE BY MOUTH THREE TIMES DAILY 90 capsule 11     No current facility-administered medications for this visit. Objective:   /70   Pulse 62   Temp 97.3 °F (36.3 °C) (Infrared)   Resp 18   Ht 4' 9\" (1.448 m)   SpO2 94%   BMI 28.78 kg/m²     PHYSICAL EXAM  Constitutional: Oriented and cooperative. Appears well-developed and well-nourished. No distress. HENT:   Head: Normocephalic and atraumatic. Right Ear:  External ear normal. Ear canal completely obstructed with cerumen impaction. Cerumen removed using alligator forceps and suction under handheld magnification. Patient reports immediate improvement in hearing. . Tympanic membrane intact, translucent. Middle ear aerated  Left Ear:  External ear normal. Ear canal completely obstructed with cerumen impaction. Cerumen removed using alligator forceps and suction under handheld magnification. Patient reports immediate improvement in hearing. . Tympanic membrane intact, translucent. Middle ear aerated  Cardiovascular:  Normal rate. Pulmonary/Chest:  Effort normal. No stridor or stertor. No respiratory distress. Musculoskeletal:  Normal range of motion. No edema or lymphadenopathy. Neurological:  Alert and answers questions appropriately, cooperative with exam.   Cranial nerve II-XII grossly intact. Skin:  Skin is warm. No erythema.    Psychiatric:

## 2023-04-06 ENCOUNTER — TELEPHONE (OUTPATIENT)
Dept: FAMILY MEDICINE CLINIC | Age: 88
End: 2023-04-06

## 2023-04-06 DIAGNOSIS — I25.10 ASHD (ARTERIOSCLEROTIC HEART DISEASE): Primary | Chronic | ICD-10-CM

## 2023-04-06 NOTE — TELEPHONE ENCOUNTER
Diagnosis Orders   1.  ASHD (arteriosclerotic heart disease)  metoprolol tartrate (LOPRESSOR) 25 MG tablet

## 2023-04-06 NOTE — TELEPHONE ENCOUNTER
Eris Wagner is requesting metoprolol for the patient but is not sure of the dose.  There are 2 doses noted 12.5 mg & 25 mg     Please advise   Needs to go to [facility] [address]

## 2023-04-06 NOTE — TELEPHONE ENCOUNTER
Can you clarify what is on her med list at [facility]. The hospital changed her med list and it doesn't fully make sense. See if she's on metoprolol tartate or metoprolol succinate and also the dose/frequent  Let me know, thanks!

## 2023-04-17 ENCOUNTER — TELEPHONE (OUTPATIENT)
Dept: FAMILY MEDICINE CLINIC | Age: 88
End: 2023-04-17

## 2023-04-17 DIAGNOSIS — N39.0 ACUTE URINARY TRACT INFECTION: Primary | ICD-10-CM

## 2023-04-17 RX ORDER — NITROFURANTOIN 25; 75 MG/1; MG/1
100 CAPSULE ORAL 2 TIMES DAILY
Qty: 10 CAPSULE | Refills: 0 | Status: SHIPPED | OUTPATIENT
Start: 2023-04-17 | End: 2023-04-22

## 2023-04-17 NOTE — TELEPHONE ENCOUNTER
Did they happen to send the urine for culture? In the future they need to call the office and not send faxes for these issues, grisel on fridays    Will need to f/u if no better     Diagnosis Orders   1.  Acute urinary tract infection  nitrofurantoin, macrocrystal-monohydrate, (MACROBID) 100 MG capsule

## 2023-04-17 NOTE — TELEPHONE ENCOUNTER
Spoke with Jeovany Monterroso nurse at [facility]. Bean Slaughter stated UA showed increased leukocytes and is asking for an antibiotic to [address] [facility]. Pt is not experiencing any other symptoms, just increase in frequency and acute change in metal status.

## 2023-04-17 NOTE — TELEPHONE ENCOUNTER
Tristin Bravo informed of the microbid 100 mg being sent to the pharmacy for hospitals. Tristin Bravo was needing to check if the urine was ran for a culture.

## 2023-05-15 ENCOUNTER — HOSPITAL ENCOUNTER (EMERGENCY)
Age: 88
Discharge: HOME | End: 2023-05-16
Payer: MEDICARE

## 2023-05-15 VITALS
RESPIRATION RATE: 18 BRPM | HEART RATE: 66 BPM | OXYGEN SATURATION: 92 % | SYSTOLIC BLOOD PRESSURE: 136 MMHG | DIASTOLIC BLOOD PRESSURE: 59 MMHG

## 2023-05-15 DIAGNOSIS — S01.512A TONGUE LACERATION, INITIAL ENCOUNTER: Primary | ICD-10-CM

## 2023-05-15 PROCEDURE — 99282 EMERGENCY DEPT VISIT SF MDM: CPT

## 2023-05-15 RX ORDER — LIDOCAINE HYDROCHLORIDE AND EPINEPHRINE 10; 10 MG/ML; UG/ML
7 INJECTION, SOLUTION INFILTRATION; PERINEURAL ONCE
Status: DISCONTINUED | OUTPATIENT
Start: 2023-05-15 | End: 2023-05-16 | Stop reason: HOSPADM

## 2023-05-16 NOTE — ED PROVIDER NOTES
[facility] [address] [address] EMERGENCY DEPT      EMERGENCY MEDICINE     Pt Name: Samantha Heard  MRN: [id number]  [place] 8/28/1928  Date of evaluation: 5/15/2023  Provider: GABRIELA Arana CNP    CHIEF COMPLAINT     No chief complaint on file. HISTORY OF PRESENT ILLNESS   Samantha Heard is a pleasant 80 y.o. female who presents to the emergency department from from home, by private vehicle for evaluation of tongue injury. Patient's daughter reports patient bit her tongue after receiving a dental procedure this morning. Daughter reports that patient is on Plavix and nursing home became concerned due to continued bleeding. PASTMEDICAL HISTORY     Past Medical History:   Diagnosis Date    ASHD (arteriosclerotic heart disease)     Constipation     DDD (degenerative disc disease), lumbar     Dyslipidemia     Fibromyalgia     GERD (gastroesophageal reflux disease)     Glaucoma     History of vertebral compression fracture; L1     Hypertension, essential     Nausea & vomiting     Osteoporosis     S/P PTCA: 2/2/2015: Stenting mid-LAD using Xience Alpine 2.75 X 28 mm, post-dilated to 3.0 mm distally and 3.18 mm proximally. 02/02/2015 2/2/2015: Stenting mid-LAD using Xience Alpine 2.75 X 28 mm, post-dilated to 3.0 mm distally and 3.18 mm proximally. Dr. Elder Pearl    Status post tympanoplasty 6/24/2013    Tinnitus of both ears 11/19/2013       Patient Active Problem List   Diagnosis Code    Hyponatremia E87.1    VMR (vasomotor rhinitis) J30.0    Status post tympanoplasty Z98.890    Tinnitus of both ears H93.13    S/P PTCA: 2/2/2015: Stenting mid-LAD using Xience Alpine 2.75 X 28 mm, post-dilated to 3.0 mm distally and 3.18 mm proximally.  Z98.61    ASHD (arteriosclerotic heart disease) I25.10    Constipation K59.00    DDD (degenerative disc disease), lumbar M51.36    Dyslipidemia E78.5    Glaucoma H40.9    History of vertebral compression fracture; L1 Z87.81    Hypertension, essential I10    Iron deficiency anemia D50.9

## 2023-05-16 NOTE — DISCHARGE INSTRUCTIONS
Rest, stay well-hydrated. Continue home medications as previously prescribed. Avoid brushing tongue, rubbing tongue along teeth and avoid foods such as potato chips or salty foods. Follow-up with primary care provider within the next 3 to 5 days for reevaluation. If any worsening or concerns return to the ER immediately.

## 2023-05-16 NOTE — ED NOTES
Pt resting on cot comfortably with family at bedside. No additional needs expressed.  [address] [facility], RN  05/15/23 9101

## 2023-05-21 DIAGNOSIS — I10 HYPERTENSION, ESSENTIAL: ICD-10-CM

## 2023-05-22 RX ORDER — AMLODIPINE BESYLATE 5 MG/1
TABLET ORAL
Qty: 90 TABLET | Refills: 3 | Status: SHIPPED | OUTPATIENT
Start: 2023-05-22

## 2023-05-22 NOTE — TELEPHONE ENCOUNTER
Recent Visits  Date Type Provider Dept   02/07/23 Office Visit Yin Atwood, DO [facility]   10/05/22 Office Visit Yin Atwood, DO [facility]   09/07/22 Office Visit Yin Atwood, DO [facility]   08/08/22 Office Visit Yin Atwood, DO [facility]   02/07/22 Office Visit Rutmichelle Angelic, DO [facility]   Showing recent visits within past 540 days with a meds authorizing provider and meeting all other requirements  Future Appointments  Date Type Provider Dept   08/09/23 Appointment Yin Atwood, DO [facility]   Showing future appointments within next 150 days with a meds authorizing provider and meeting all other requirements     Future Appointments   Date Time Provider Bj Ahuja   7/12/2023  2:30 PM Ana Gutierrez [facility]   8/9/2023  2:00 PM Yin Atwood, [address] [place] [address]

## 2023-05-23 LAB
BASOPHILS ABSOLUTE: ABNORMAL
BASOPHILS RELATIVE PERCENT: ABNORMAL
EOSINOPHILS ABSOLUTE: ABNORMAL
EOSINOPHILS RELATIVE PERCENT: ABNORMAL
FERRITIN: 18 NG/ML (ref 9–150)
FOLATE: 22
HCT VFR BLD CALC: 34.6 % (ref 36–46)
HEMOGLOBIN: 11.4 G/DL (ref 12–16)
IRON SATURATION: 14
IRON: 48
IRON: 48
LYMPHOCYTES ABSOLUTE: ABNORMAL
LYMPHOCYTES RELATIVE PERCENT: ABNORMAL
MCH RBC QN AUTO: ABNORMAL PG
MCHC RBC AUTO-ENTMCNC: ABNORMAL G/DL
MCV RBC AUTO: ABNORMAL FL
MONOCYTES ABSOLUTE: ABNORMAL
MONOCYTES RELATIVE PERCENT: ABNORMAL
NEUTROPHILS ABSOLUTE: ABNORMAL
NEUTROPHILS RELATIVE PERCENT: ABNORMAL
PLATELET # BLD: 217 K/ΜL
PMV BLD AUTO: ABNORMAL FL
RBC # BLD: 3.61 10^6/ΜL
TOTAL IRON BINDING CAPACITY: 353
VITAMIN B-12: 563
WBC # BLD: 5.5 10^3/ML

## 2023-05-25 ENCOUNTER — TELEPHONE (OUTPATIENT)
Dept: FAMILY MEDICINE CLINIC | Age: 88
End: 2023-05-25

## 2023-05-25 NOTE — TELEPHONE ENCOUNTER
----- Message from Bao James, DO sent at 5/25/2023  7:04 AM EDT -----  Please let pt/ know that labs are stable  Ok to con't off iron/b12  Let me know if questions, thanks!

## 2023-05-25 NOTE — TELEPHONE ENCOUNTER
I called and spoke to Neftali Downey pts dtr ok per HIPAA and she verbalized understanding and had no questions at this time.

## 2023-06-19 ENCOUNTER — APPOINTMENT (OUTPATIENT)
Dept: CT IMAGING | Age: 88
End: 2023-06-19
Payer: MEDICARE

## 2023-06-19 ENCOUNTER — APPOINTMENT (OUTPATIENT)
Dept: MRI IMAGING | Age: 88
End: 2023-06-19
Payer: MEDICARE

## 2023-06-19 ENCOUNTER — HOSPITAL ENCOUNTER (INPATIENT)
Age: 88
LOS: 5 days | Discharge: HOME | End: 2023-06-24
Attending: NURSE PRACTITIONER
Payer: MEDICARE

## 2023-06-19 ENCOUNTER — APPOINTMENT (OUTPATIENT)
Dept: INTERVENTIONAL RADIOLOGY/VASCULAR | Age: 88
End: 2023-06-19
Payer: MEDICARE

## 2023-06-19 DIAGNOSIS — S22.050A COMPRESSION FRACTURE OF T6 VERTEBRA, INITIAL ENCOUNTER (HCC): Primary | ICD-10-CM

## 2023-06-19 PROBLEM — M54.59 INTRACTABLE LOW BACK PAIN: Status: ACTIVE | Noted: 2023-06-19

## 2023-06-19 LAB
ANION GAP SERPL CALC-SCNC: 10 MEQ/L (ref 8–16)
BASOPHILS ABSOLUTE: 0 THOU/MM3 (ref 0–0.1)
BASOPHILS NFR BLD AUTO: 0.5 %
BUN SERPL-MCNC: 10 MG/DL (ref 7–22)
CALCIUM SERPL-MCNC: 9.4 MG/DL (ref 8.5–10.5)
CHLORIDE SERPL-SCNC: 96 MEQ/L (ref 98–111)
CO2 SERPL-SCNC: 27 MEQ/L (ref 23–33)
CREAT SERPL-MCNC: 0.4 MG/DL (ref 0.4–1.2)
DEPRECATED RDW RBC AUTO: 43.3 FL (ref 35–45)
EOSINOPHIL NFR BLD AUTO: 2.3 %
EOSINOPHILS ABSOLUTE: 0.1 THOU/MM3 (ref 0–0.4)
ERYTHROCYTE [DISTWIDTH] IN BLOOD BY AUTOMATED COUNT: 12.3 % (ref 11.5–14.5)
GFR SERPL CREATININE-BSD FRML MDRD: > 60 ML/MIN/1.73M2
GLUCOSE SERPL-MCNC: 122 MG/DL (ref 70–108)
HCT VFR BLD AUTO: 39.1 % (ref 37–47)
HGB BLD-MCNC: 12.8 GM/DL (ref 12–16)
IMM GRANULOCYTES # BLD AUTO: 0.01 THOU/MM3 (ref 0–0.07)
IMM GRANULOCYTES NFR BLD AUTO: 0.2 %
LYMPHOCYTES ABSOLUTE: 1.3 THOU/MM3 (ref 1–4.8)
LYMPHOCYTES NFR BLD AUTO: 22.3 %
MCH RBC QN AUTO: 31.2 PG (ref 26–33)
MCHC RBC AUTO-ENTMCNC: 32.7 GM/DL (ref 32.2–35.5)
MCV RBC AUTO: 95.4 FL (ref 81–99)
MONOCYTES ABSOLUTE: 0.6 THOU/MM3 (ref 0.4–1.3)
MONOCYTES NFR BLD AUTO: 10 %
NEUTROPHILS NFR BLD AUTO: 64.7 %
NRBC BLD AUTO-RTO: 0 /100 WBC
OSMOLALITY SERPL CALC.SUM OF ELEC: 266.7 MOSMOL/KG (ref 275–300)
PLATELET # BLD AUTO: 263 THOU/MM3 (ref 130–400)
PMV BLD AUTO: 9.7 FL (ref 9.4–12.4)
POTASSIUM SERPL-SCNC: 4 MEQ/L (ref 3.5–5.2)
RBC # BLD AUTO: 4.1 MILL/MM3 (ref 4.2–5.4)
SEGMENTED NEUTROPHILS ABSOLUTE COUNT: 3.7 THOU/MM3 (ref 1.8–7.7)
SODIUM SERPL-SCNC: 133 MEQ/L (ref 135–145)
WBC # BLD AUTO: 5.7 THOU/MM3 (ref 4.8–10.8)

## 2023-06-19 PROCEDURE — 6370000000 HC RX 637 (ALT 250 FOR IP): Performed by: NURSE PRACTITIONER

## 2023-06-19 PROCEDURE — 2580000003 HC RX 258: Performed by: NURSE PRACTITIONER

## 2023-06-19 PROCEDURE — 72131 CT LUMBAR SPINE W/O DYE: CPT

## 2023-06-19 PROCEDURE — 99223 1ST HOSP IP/OBS HIGH 75: CPT | Performed by: NURSE PRACTITIONER

## 2023-06-19 PROCEDURE — 99285 EMERGENCY DEPT VISIT HI MDM: CPT

## 2023-06-19 PROCEDURE — 72146 MRI CHEST SPINE W/O DYE: CPT

## 2023-06-19 PROCEDURE — 80048 BASIC METABOLIC PNL TOTAL CA: CPT

## 2023-06-19 PROCEDURE — 85025 COMPLETE CBC W/AUTO DIFF WBC: CPT

## 2023-06-19 PROCEDURE — 1200000000 HC SEMI PRIVATE

## 2023-06-19 PROCEDURE — 72125 CT NECK SPINE W/O DYE: CPT

## 2023-06-19 PROCEDURE — 76000 FLUOROSCOPY <1 HR PHYS/QHP: CPT

## 2023-06-19 PROCEDURE — 6360000002 HC RX W HCPCS: Performed by: NURSE PRACTITIONER

## 2023-06-19 PROCEDURE — 36415 COLL VENOUS BLD VENIPUNCTURE: CPT

## 2023-06-19 PROCEDURE — 72128 CT CHEST SPINE W/O DYE: CPT

## 2023-06-19 RX ORDER — ACETAMINOPHEN 650 MG/1
650 SUPPOSITORY RECTAL EVERY 6 HOURS PRN
Status: DISCONTINUED | OUTPATIENT
Start: 2023-06-19 | End: 2023-06-24 | Stop reason: HOSPADM

## 2023-06-19 RX ORDER — ONDANSETRON 4 MG/1
4 TABLET, ORALLY DISINTEGRATING ORAL EVERY 8 HOURS PRN
Status: DISCONTINUED | OUTPATIENT
Start: 2023-06-19 | End: 2023-06-24 | Stop reason: HOSPADM

## 2023-06-19 RX ORDER — POTASSIUM CHLORIDE 7.45 MG/ML
10 INJECTION INTRAVENOUS PRN
Status: DISCONTINUED | OUTPATIENT
Start: 2023-06-19 | End: 2023-06-24 | Stop reason: HOSPADM

## 2023-06-19 RX ORDER — MAGNESIUM SULFATE IN WATER 40 MG/ML
2000 INJECTION, SOLUTION INTRAVENOUS PRN
Status: DISCONTINUED | OUTPATIENT
Start: 2023-06-19 | End: 2023-06-24 | Stop reason: HOSPADM

## 2023-06-19 RX ORDER — ISOSORBIDE MONONITRATE 30 MG/1
30 TABLET, EXTENDED RELEASE ORAL NIGHTLY
Status: DISCONTINUED | OUTPATIENT
Start: 2023-06-19 | End: 2023-06-24 | Stop reason: HOSPADM

## 2023-06-19 RX ORDER — ACETAMINOPHEN 325 MG/1
650 TABLET ORAL EVERY 6 HOURS PRN
Status: DISCONTINUED | OUTPATIENT
Start: 2023-06-19 | End: 2023-06-24 | Stop reason: HOSPADM

## 2023-06-19 RX ORDER — TIMOLOL MALEATE 5 MG/ML
1 SOLUTION/ DROPS OPHTHALMIC 2 TIMES DAILY
Status: DISCONTINUED | OUTPATIENT
Start: 2023-06-19 | End: 2023-06-24 | Stop reason: HOSPADM

## 2023-06-19 RX ORDER — ONDANSETRON 2 MG/ML
4 INJECTION INTRAMUSCULAR; INTRAVENOUS EVERY 6 HOURS PRN
Status: DISCONTINUED | OUTPATIENT
Start: 2023-06-19 | End: 2023-06-24 | Stop reason: HOSPADM

## 2023-06-19 RX ORDER — LOSARTAN POTASSIUM 25 MG/1
25 TABLET ORAL NIGHTLY
Status: DISCONTINUED | OUTPATIENT
Start: 2023-06-20 | End: 2023-06-24 | Stop reason: HOSPADM

## 2023-06-19 RX ORDER — CLOPIDOGREL BISULFATE 75 MG/1
75 TABLET ORAL DAILY
Status: DISCONTINUED | OUTPATIENT
Start: 2023-06-19 | End: 2023-06-24 | Stop reason: HOSPADM

## 2023-06-19 RX ORDER — GABAPENTIN 100 MG/1
100 CAPSULE ORAL 3 TIMES DAILY
Status: DISCONTINUED | OUTPATIENT
Start: 2023-06-19 | End: 2023-06-24 | Stop reason: HOSPADM

## 2023-06-19 RX ORDER — ATORVASTATIN CALCIUM 10 MG/1
10 TABLET, FILM COATED ORAL NIGHTLY
Status: DISCONTINUED | OUTPATIENT
Start: 2023-06-19 | End: 2023-06-24 | Stop reason: HOSPADM

## 2023-06-19 RX ORDER — MUSCLE RUB CREAM 100; 150 MG/G; MG/G
CREAM TOPICAL PRN
Status: DISCONTINUED | OUTPATIENT
Start: 2023-06-19 | End: 2023-06-24 | Stop reason: HOSPADM

## 2023-06-19 RX ORDER — MIRTAZAPINE 15 MG/1
15 TABLET, FILM COATED ORAL NIGHTLY
Status: DISCONTINUED | OUTPATIENT
Start: 2023-06-19 | End: 2023-06-19

## 2023-06-19 RX ORDER — POTASSIUM CHLORIDE 20 MEQ/1
40 TABLET, EXTENDED RELEASE ORAL PRN
Status: DISCONTINUED | OUTPATIENT
Start: 2023-06-19 | End: 2023-06-24 | Stop reason: HOSPADM

## 2023-06-19 RX ORDER — SODIUM CHLORIDE 0.9 % (FLUSH) 0.9 %
5-40 SYRINGE (ML) INJECTION EVERY 12 HOURS SCHEDULED
Status: DISCONTINUED | OUTPATIENT
Start: 2023-06-19 | End: 2023-06-24 | Stop reason: HOSPADM

## 2023-06-19 RX ORDER — PANTOPRAZOLE SODIUM 40 MG/1
40 TABLET, DELAYED RELEASE ORAL
Status: DISCONTINUED | OUTPATIENT
Start: 2023-06-20 | End: 2023-06-24 | Stop reason: HOSPADM

## 2023-06-19 RX ORDER — ASPIRIN 81 MG/1
81 TABLET, CHEWABLE ORAL DAILY
Status: DISCONTINUED | OUTPATIENT
Start: 2023-06-19 | End: 2023-06-23

## 2023-06-19 RX ORDER — SODIUM CHLORIDE 0.9 % (FLUSH) 0.9 %
5-40 SYRINGE (ML) INJECTION PRN
Status: DISCONTINUED | OUTPATIENT
Start: 2023-06-19 | End: 2023-06-24 | Stop reason: HOSPADM

## 2023-06-19 RX ORDER — SODIUM CHLORIDE 9 MG/ML
INJECTION, SOLUTION INTRAVENOUS ONCE
Status: COMPLETED | OUTPATIENT
Start: 2023-06-19 | End: 2023-06-19

## 2023-06-19 RX ORDER — MIRTAZAPINE 7.5 MG/1
7.5 TABLET, FILM COATED ORAL NIGHTLY
Status: DISCONTINUED | OUTPATIENT
Start: 2023-06-19 | End: 2023-06-24 | Stop reason: HOSPADM

## 2023-06-19 RX ORDER — POLYETHYLENE GLYCOL 3350 17 G/17G
17 POWDER, FOR SOLUTION ORAL DAILY PRN
Status: DISCONTINUED | OUTPATIENT
Start: 2023-06-19 | End: 2023-06-23

## 2023-06-19 RX ORDER — SODIUM CHLORIDE 9 MG/ML
INJECTION, SOLUTION INTRAVENOUS PRN
Status: DISCONTINUED | OUTPATIENT
Start: 2023-06-19 | End: 2023-06-24 | Stop reason: HOSPADM

## 2023-06-19 RX ADMIN — ACETAMINOPHEN 650 MG: 325 TABLET ORAL at 17:48

## 2023-06-19 RX ADMIN — MIRTAZAPINE 7.5 MG: 7.5 TABLET ORAL at 21:02

## 2023-06-19 RX ADMIN — METOPROLOL TARTRATE 37.5 MG: 25 TABLET, FILM COATED ORAL at 18:40

## 2023-06-19 RX ADMIN — SODIUM CHLORIDE, PRESERVATIVE FREE 10 ML: 5 INJECTION INTRAVENOUS at 21:02

## 2023-06-19 RX ADMIN — SODIUM CHLORIDE: 9 INJECTION, SOLUTION INTRAVENOUS at 15:12

## 2023-06-19 RX ADMIN — TIMOLOL MALEATE 1 DROP: 5 SOLUTION OPHTHALMIC at 21:02

## 2023-06-19 RX ADMIN — GABAPENTIN 100 MG: 100 CAPSULE ORAL at 22:41

## 2023-06-19 RX ADMIN — ONDANSETRON 4 MG: 2 INJECTION INTRAMUSCULAR; INTRAVENOUS at 22:43

## 2023-06-19 RX ADMIN — MENTHOL, METHYL SALICYLATE: 10; 15 CREAM TOPICAL at 22:41

## 2023-06-19 RX ADMIN — ISOSORBIDE MONONITRATE 30 MG: 30 TABLET, EXTENDED RELEASE ORAL at 21:02

## 2023-06-19 ASSESSMENT — PAIN DESCRIPTION - DESCRIPTORS
DESCRIPTORS: ACHING;DISCOMFORT
DESCRIPTORS: ACHING;DISCOMFORT

## 2023-06-19 ASSESSMENT — ENCOUNTER SYMPTOMS
CHEST TIGHTNESS: 0
WHEEZING: 0
BACK PAIN: 1
STRIDOR: 0
SORE THROAT: 0
VOMITING: 0
CONSTIPATION: 0
COLOR CHANGE: 0
BLOOD IN STOOL: 0
COUGH: 0
SHORTNESS OF BREATH: 0
DIARRHEA: 0
ABDOMINAL PAIN: 0
TROUBLE SWALLOWING: 0
NAUSEA: 0

## 2023-06-19 ASSESSMENT — PAIN - FUNCTIONAL ASSESSMENT
PAIN_FUNCTIONAL_ASSESSMENT: PREVENTS OR INTERFERES SOME ACTIVE ACTIVITIES AND ADLS
PAIN_FUNCTIONAL_ASSESSMENT: 0-10
PAIN_FUNCTIONAL_ASSESSMENT: ACTIVITIES ARE NOT PREVENTED

## 2023-06-19 ASSESSMENT — PAIN DESCRIPTION - ORIENTATION
ORIENTATION: MID;POSTERIOR
ORIENTATION: MID;POSTERIOR
ORIENTATION: LOWER
ORIENTATION: LOWER

## 2023-06-19 ASSESSMENT — PAIN SCALES - WONG BAKER
WONGBAKER_NUMERICALRESPONSE: 8

## 2023-06-19 ASSESSMENT — PAIN SCALES - GENERAL
PAINLEVEL_OUTOF10: 3
PAINLEVEL_OUTOF10: 10
PAINLEVEL_OUTOF10: 0
PAINLEVEL_OUTOF10: 0

## 2023-06-19 ASSESSMENT — PAIN DESCRIPTION - LOCATION
LOCATION: BACK

## 2023-06-19 ASSESSMENT — PAIN DESCRIPTION - PAIN TYPE: TYPE: CHRONIC PAIN

## 2023-06-19 ASSESSMENT — PAIN DESCRIPTION - ONSET: ONSET: ON-GOING

## 2023-06-19 ASSESSMENT — PAIN DESCRIPTION - FREQUENCY: FREQUENCY: CONTINUOUS

## 2023-06-19 NOTE — ED NOTES
Patient resting in bed. Respirations easy and unlabored. No distress noted. Call light within reach.        Mary Ann Lau RN  06/19/23 8162

## 2023-06-19 NOTE — PROGRESS NOTES
3551 Pt to Specials for vertebroplasty consultation with Dr. Antone Shone. 1700 Dr. Antone Shone speaking with patient about procedure. Pt examined, pt is not candidate for procedure per Dr. Antone Shone. Dr. Antone Shone suggests that patient may benefit from a left T9-10 intercostal block. 1703 Pt back to bed and positioned for comfort. 1707 Pt out of room and back to room.

## 2023-06-19 NOTE — ED NOTES
Patient resting in bed. Respirations easy and unlabored. No distress noted. Call light within reach.        Marielle Reyes RN  06/19/23 8560

## 2023-06-19 NOTE — H&P
Hospitalist History & Physical    Patient: Mukesh Donald    Unit/Bed:08/008A  YOB: 1928  MRN: [id number]   Acct: [de-identified]   PCP: Omar Gallegos DO  Code Status: Prior    Date of Service: Pt seen/examined on 06/19/23 and admitted to Inpatient with expected LOS greater than two midnights due to medical therapy. Chief Complaint: upper back pain    Assessment/Plan:  Intractable L mid/upper back pain  Compression fracture of T6 with 50% height loss per CT T spine: age indeterminate, likely pathological.  DDD  H/o L1 and T11 compression fractures s/p kyphoplasty  Osteoporosis   -IR consulted per ED for possible vertebraplasty, MRI thoracic spine ordered. NPO for now. Gentle hydration in interim  -hold ASA and Plavix  -PT/OT when ok with interventional team    Other significant medical history:  CAD s/p PTCA: 2/2/2015: Stenting mid-LAD. -cont BB, holding ASA/Plavix  HTN  -check BMP/renal function  -resume ARB tomorrow  GORDO  -c/o fatigue, will check CBC  GERD  -cont PPI  Glaucoma  -cont home eye drop regimen    DVT prophylaxis: SCDs   Code status: DNR-CC    History of Present Illness: Mukesh Donald is a 80 y.o. female with PMHx that includes: Coronary artery disease, hypertension, GERD, glaucoma, degenerative disc disease, osteoporosis, history of L1 and T11 compression fracture status post kyphoplasty who presented to [facility] with chief complaint of upper back pain. Patient is alert but very hard of hearing even with hearing aids and baseline dementia. HPI obtained from daughter at bedside. Daughter states patient resides at assisted living with . Patient began complaining of mid upper back pain about a week ago, no known falls, trauma, injury. Daughter states patient has been having more periods of tiredness and fatigue that are intermittent otherwise has been in her normal state of health with no documented fever, chills, complaints of cough or abdominal pain.   While at

## 2023-06-19 NOTE — ED TRIAGE NOTES
Presents to ED with c/o lower back pain that started about a week ago. Patient denies injury. Family at bedside.  Will monitor

## 2023-06-19 NOTE — H&P
Shelby Henderson is a 80 y.o. female was evaluated for possible vertebroplasty / augmentation and is NOT an appropriate candidate at this time. NO pain at T6 level. Pain is related to the left T9 and T10 ribs. Could benefit from intercostal block . SEE DICTATED REPORT FOR COMPLETE DETAILS.     Ana Elizabeth MD 6/19/2023 5:00 PM

## 2023-06-19 NOTE — ED PROVIDER NOTES
[facility] Emergency Department    CHIEF COMPLAINT     Back pain exacerbation x 1 week    Chief Complaint   Patient presents with    Back Pain     Nurses Notes reviewed and I agree except as noted in the HPI. HISTORY OF PRESENT ILLNESS   Dionne Carney is a 80 y.o. female who presents to the ED for evaluation of back pain exacerbation x 1 week. Patient is with her daughter Deni Bhatti, who states she is patient's POA. Daughter states patient has had chronic and intermittent back pains for a long time, but it has recently gotten worse for the past 1 week as daughter has noticed patient grimacing with position changes and occasionally when patient is using her walker to ambulate. Patient struggles to characterize and describe her pain, and daughter states patient has dementia. Patient says that the pain is \"sharp then dull\". Daughter states patient also experiencing pain in her left shoulder and between her shoulder blades. Patient is on gabapentin, Tylenol, Aspercream, and PCP recently put patient on Zanaflex which has seemed to help a little bit. Daughter states patient has had fatigue for x 6 months and has subsequently stayed in bed for most of the day. Patient has a history of fibromyalgia, osteoporosis, degenerative disk disease of her lumbar region, history of vertebral compression fractures, Kyphosis surgery in 2017 with an L1 compression fracture, s/p PTCA 2015 on ASA and plavix. Daughter states this reminds her of when the patient had a compression fracture in the past. Patient and daughter deny weight changes, fever or chills, bowel or urinary incontinence or other issues, saddle paresthesias, numbness or tingling, weakness, dyspnea, chest pain, abdominal pain, neck pain, trauma or falls. POA notes that patient lives in assisted living, and is having difficulty caring for herself due to this pain.       Pain description:  Onset: gradual  Location: back  Duration: x 1 week  Character: patient struggles to

## 2023-06-19 NOTE — ED NOTES
Patient resting in bed. Respirations easy and unlabored. No distress noted. Call light within reach.        Marielle Reyes RN  06/19/23 3547

## 2023-06-20 ENCOUNTER — APPOINTMENT (OUTPATIENT)
Dept: INTERVENTIONAL RADIOLOGY/VASCULAR | Age: 88
End: 2023-06-20
Payer: MEDICARE

## 2023-06-20 PROCEDURE — 51798 US URINE CAPACITY MEASURE: CPT

## 2023-06-20 PROCEDURE — 2500000003 HC RX 250 WO HCPCS: Performed by: RADIOLOGY

## 2023-06-20 PROCEDURE — 1200000000 HC SEMI PRIVATE

## 2023-06-20 PROCEDURE — 2580000003 HC RX 258: Performed by: NURSE PRACTITIONER

## 2023-06-20 PROCEDURE — 6370000000 HC RX 637 (ALT 250 FOR IP): Performed by: NURSE PRACTITIONER

## 2023-06-20 PROCEDURE — 2709999900 IR INTERVENTIONAL RADIOLOGY PROCEDURE REQUEST

## 2023-06-20 PROCEDURE — 99232 SBSQ HOSP IP/OBS MODERATE 35: CPT | Performed by: NURSE PRACTITIONER

## 2023-06-20 PROCEDURE — 6360000002 HC RX W HCPCS: Performed by: RADIOLOGY

## 2023-06-20 RX ORDER — METHYLPREDNISOLONE ACETATE 80 MG/ML
80 INJECTION, SUSPENSION INTRA-ARTICULAR; INTRALESIONAL; INTRAMUSCULAR; SOFT TISSUE ONCE
Status: COMPLETED | OUTPATIENT
Start: 2023-06-20 | End: 2023-06-20

## 2023-06-20 RX ORDER — PANTOPRAZOLE SODIUM 40 MG/1
TABLET, DELAYED RELEASE ORAL
Qty: 90 TABLET | Refills: 0 | OUTPATIENT
Start: 2023-06-20

## 2023-06-20 RX ORDER — QUETIAPINE FUMARATE 25 MG/1
25 TABLET, FILM COATED ORAL NIGHTLY
Status: DISCONTINUED | OUTPATIENT
Start: 2023-06-20 | End: 2023-06-24 | Stop reason: HOSPADM

## 2023-06-20 RX ORDER — LIDOCAINE 4 G/G
3 PATCH TOPICAL DAILY
Status: DISCONTINUED | OUTPATIENT
Start: 2023-06-20 | End: 2023-06-24 | Stop reason: HOSPADM

## 2023-06-20 RX ORDER — BUPIVACAINE HYDROCHLORIDE 2.5 MG/ML
6 INJECTION, SOLUTION EPIDURAL; INFILTRATION; INTRACAUDAL ONCE
Status: COMPLETED | OUTPATIENT
Start: 2023-06-20 | End: 2023-06-20

## 2023-06-20 RX ORDER — VALSARTAN 320 MG/1
TABLET ORAL
Qty: 90 TABLET | Refills: 0 | OUTPATIENT
Start: 2023-06-20

## 2023-06-20 RX ADMIN — GABAPENTIN 100 MG: 100 CAPSULE ORAL at 09:38

## 2023-06-20 RX ADMIN — METOPROLOL TARTRATE 37.5 MG: 25 TABLET, FILM COATED ORAL at 17:17

## 2023-06-20 RX ADMIN — METHYLPREDNISOLONE ACETATE 80 MG: 80 INJECTION, SUSPENSION INTRA-ARTICULAR; INTRALESIONAL; INTRAMUSCULAR; SOFT TISSUE at 16:48

## 2023-06-20 RX ADMIN — ISOSORBIDE MONONITRATE 30 MG: 30 TABLET, EXTENDED RELEASE ORAL at 20:14

## 2023-06-20 RX ADMIN — BUPIVACAINE HYDROCHLORIDE 5 ML: 2.5 INJECTION, SOLUTION EPIDURAL; INFILTRATION; INTRACAUDAL; PERINEURAL at 16:48

## 2023-06-20 RX ADMIN — GABAPENTIN 100 MG: 100 CAPSULE ORAL at 14:20

## 2023-06-20 RX ADMIN — QUETIAPINE FUMARATE 25 MG: 25 TABLET ORAL at 20:14

## 2023-06-20 RX ADMIN — LOSARTAN POTASSIUM 25 MG: 25 TABLET, FILM COATED ORAL at 20:14

## 2023-06-20 RX ADMIN — ACETAMINOPHEN 650 MG: 325 TABLET ORAL at 00:41

## 2023-06-20 RX ADMIN — MIRTAZAPINE 7.5 MG: 7.5 TABLET ORAL at 20:14

## 2023-06-20 RX ADMIN — ACETAMINOPHEN 650 MG: 325 TABLET ORAL at 20:17

## 2023-06-20 RX ADMIN — SODIUM CHLORIDE, PRESERVATIVE FREE 10 ML: 5 INJECTION INTRAVENOUS at 20:14

## 2023-06-20 RX ADMIN — SODIUM CHLORIDE, PRESERVATIVE FREE 10 ML: 5 INJECTION INTRAVENOUS at 09:38

## 2023-06-20 RX ADMIN — MENTHOL, METHYL SALICYLATE: 10; 15 CREAM TOPICAL at 09:43

## 2023-06-20 RX ADMIN — TIMOLOL MALEATE 1 DROP: 5 SOLUTION OPHTHALMIC at 20:14

## 2023-06-20 RX ADMIN — GABAPENTIN 100 MG: 100 CAPSULE ORAL at 20:14

## 2023-06-20 RX ADMIN — TIMOLOL MALEATE 1 DROP: 5 SOLUTION OPHTHALMIC at 09:40

## 2023-06-20 RX ADMIN — PANTOPRAZOLE SODIUM 40 MG: 40 TABLET, DELAYED RELEASE ORAL at 05:58

## 2023-06-20 ASSESSMENT — PAIN SCALES - WONG BAKER
WONGBAKER_NUMERICALRESPONSE: 0
WONGBAKER_NUMERICALRESPONSE: 0
WONGBAKER_NUMERICALRESPONSE: 8
WONGBAKER_NUMERICALRESPONSE: 0
WONGBAKER_NUMERICALRESPONSE: 8

## 2023-06-20 ASSESSMENT — PAIN SCALES - GENERAL
PAINLEVEL_OUTOF10: 0
PAINLEVEL_OUTOF10: 0

## 2023-06-20 NOTE — PLAN OF CARE
Problem: Discharge Planning  Goal: Discharge to home or other facility with appropriate resources  Outcome: Progressing       Consult received.  Please see SW note dated 6/20

## 2023-06-20 NOTE — H&P
Formulation and discussion of sedation / procedure plans, risks, benefits, side effects and alternatives with patient and/or responsible adult completed. History and Physical reviewed and unchanged.     Electronically signed by Jessica Castellanos MD on 6/20/23 at 4:49 PM EDT

## 2023-06-20 NOTE — PROGRESS NOTES
Hospitalist Progress Note    Patient: Kameron Fenton      Unit/Bed:8B-34/034-A    YOB: 1928    MRN: [id number]       Acct: [de-identified]     PCP: Gutierrez Mattson DO    Date of Admission: 6/19/2023    Assessment/Plan:    Intractable left mid/upper back pain--pain management has been consulted as IR feels the pain is related to the left T9 and T10 rib area and could benefit from intercostal block  Compression fracture of T6 with 50% height loss, possibly secondary to osteoporosis--Per IR not a candidate for vertebroplasty/augmentation as no pain at that level  Hyponatremia, mild  Degenerative disc disease  History of L1 and T11 compression fracture status post kyphoplasty, please secondary to osteoporosis  Osteoporosis  Primary hypertension, uncontrolled--Imdur, Cozaar, Lopressor; monitor  Iron deficiency anemia--CBC is normal along with MCV and MCH  GERD--does not appear to be on any medications  CAD status post PTCA February 2, 2015--aspirin and Plavix are currently on hold in case procedures to be done; on Imdur  Glaucoma  CODE STATUS--DNR comfort care      Expected discharge date: Pending clinical course    Disposition:    [x] Home       [] TCU       [] Rehab       [] Psych       [] SNF       [] [place]       [] Other-    Chief Complaint: Upper back pain    Hospital Course: H&P done 6/19/2023: Akua Hughes is a 80 y.o. female with PMHx that includes: Coronary artery disease, hypertension, GERD, glaucoma, degenerative disc disease, osteoporosis, history of L1 and T11 compression fracture status post kyphoplasty who presented to [facility] with chief complaint of upper back pain. Patient is alert but very hard of hearing even with hearing aids and baseline dementia. HPI obtained from daughter at bedside. Daughter states patient resides at assisted living with . Patient began complaining of mid upper back pain about a week ago, no known falls, trauma, injury.

## 2023-06-20 NOTE — CONSULTS
Son states that they are planning to do this today or tomorrow and this also reported from primary RN. We did discuss that with compression fracture, can follow up with us outpatient if pain continues or unresolved. At this time not having pain at T6   Continue Tylenol 650 mg PRN  Continue muscle rub PRN  Start Lidocaine patches, 3 patches  Continue Neurontin 100 mg TID  Continue OT/PT   Pain management will sign of at this time, will defer as IR is planning intercostal nerve block. Again discussed follow up with me after discharge if needed for Kyphoplasty if pain worsens and does not improve. Education given to patient and her son Terra Louis. It was my pleasure to evaluate Cynthia Lyman today. I spent over 65 minutes evaluating this patient and completing documentation. Please call with questions.     Mague Myers, APRN - CNP

## 2023-06-20 NOTE — CARE COORDINATION
6/20/23, 2:49 PM EDT      DISCHARGE PLANNING EVALUATION    Xavi Medina  Admitted: 6/19/2023  Hospital Day: 1    Location: [address] [place] [address]- Reason for admit: Intractable low back pain [M54.59]  Compression fracture of T6 vertebra, initial encounter ([facility] [address] [S22.050A]    Past Medical History:   Diagnosis Date    ASHD (arteriosclerotic heart disease)     Constipation     DDD (degenerative disc disease), lumbar     Dyslipidemia     Fibromyalgia     GERD (gastroesophageal reflux disease)     Glaucoma     History of vertebral compression fracture; L1     Hypertension, essential     Nausea & vomiting     Osteoporosis     S/P PTCA: 2/2/2015: Stenting mid-LAD using Xience Alpine 2.75 X 28 mm, post-dilated to 3.0 mm distally and 3.18 mm proximally. 02/02/2015 2/2/2015: Stenting mid-LAD using Xience Alpine 2.75 X 28 mm, post-dilated to 3.0 mm distally and 3.18 mm proximally. Dr. Vidya Philip    Status post tympanoplasty 6/24/2013    Tinnitus of both ears 11/19/2013       Procedure: No  Barriers to Discharge: Admitted from ER with back pain. Worse past week. Hx some dementia per dtr. Imaging reveals acute compression fx of T6, L1, L2 and L4. Pain Management, PT/OT, and SW consulted. PCP: David Vega DO    Readmission Risk Low 0-14, Mod 15-19), High > 20: Readmission Risk Score: 14.2      Advance Directives:      Code Status: DNR-CC   Patient's Primary Decision Maker is: Named in [address] [place] [address]      Patient Goals/Plan/Treatment Preferences: SW following for discharge planning. Pt resides at [address] [place] [address],[address]    Transportation/Food Security/Housekeeping Addressed: No issues identified.

## 2023-06-20 NOTE — H&P
Formulation and discussion of sedation / procedure plans, risks, benefits, side effects and alternatives with patient and/or responsible adult completed. History and Physical reviewed and unchanged.     Electronically signed by Jude Chang MD on 6/20/23 at 4:11 PM EDT

## 2023-06-20 NOTE — PROGRESS NOTES
[address] Pt in specials radiology for left intercostal block Left T9-10. Consent obtained per floor RN per daughter-POA. Pt alert to person only. Pt states she has pain in her mid back to the side but unable to rate on scale 1-10.  1640 Pt positioned prone on table. 1648 Intercostal block left T9-10 complete. Pt tolerated fair. 1655 Pt positioned on bed for comfort. States her back feels numb. 1657 Transferred to  per bed.

## 2023-06-20 NOTE — OP NOTE
Department of Radiology  Post Procedure Progress Note      Pre-Procedure Diagnosis:  back pain     Procedure Performed:  intercostal nerve block     Anesthesia: local    Findings: successful    Immediate Complications:  None    Estimated Blood Loss: minimal    SEE DICTATED PROCEDURE NOTE FOR COMPLETE DETAILS.     Adina Hi MD   6/20/2023 4:49 PM

## 2023-06-20 NOTE — PROGRESS NOTES
[place]  SPEECH THERAPY COMMUNICATION NOTE  STRZ MED SURG 8B      Date: 2023  Patient Name: Antonio Cruz        MRN: [id number]    : 1928  (80 y.o.)    REASON FOR COMMUNICATION:   Novel orders received for speech therapy consultation via GABRIELA Dupree CNP; however upon chart review, patient code status DNR-CC. Pt with dx of dementia which is documented to be at baseline and has current plans for safe discharge. Given pt's goals of care (comfort measures/DNR), clinical swallow evaluation and/or instrumental exam is unlikely to alter POC. Pt is not likely to benefit from potential direct swallow rehabilitation as medical prognosis is poor . Recommend patient continue regular diet and thin liquids. Should code status be altered or further clinical concerns arise, please re-consult ST tello.        Shane Smith, [address] [place] [address] Street

## 2023-06-20 NOTE — CARE COORDINATION
6/20/23, 11:37 AM EDT  Discharge Planning Evaluation  Social work consult received, patient from [address] [place] [address],[address]. Patient/Family preference is to return to [facility]. Would patient be willing to go to a skilled facility if needed: Extremely reluctant, patient has dementia and does not do well when  from . Is there skilled care available at current facility: Yes. Barriers to return to current living situation: Mobility  Spoke with Emma ([phone]) at the facility. Patient bed hold: Yes- Medicaid [place]  Anticipated transport plan: Ambulance  Do they require COVID 19 test to return to ECF: No  Is there a required time frame which which COVID test needs done: N/A  Patient's Healthcare Decision Maker: Named in [facility]    Readmission Risk Low 0-14, Mod 15-19), High > 20: Readmission Risk Score: 14.2    Current PCP: Darya Lara, DO  PCP verified by CM? Yes    Patient Orientation: Person    Patient Cognition: Dementia / Early Alzheimer's  History Provided by: Child/Family    Advance Directives:      Code Status: DNR-CC   Patient's Primary Decision Maker is: Named in Scanned ACP Document       Discharge Planning:    Patient lives with: Spouse/Significant Other, Other (Comment) (At [facility] with ) Type of Home: Assisted living  Primary Care Giver:  Other (Comment) ([address] [place] [address],[address] with )  Patient Support Systems include: Spouse/Significant Other, Children   Current Financial resources: Medicare, Medicaid  Current community resources: Assisted Living  Current services prior to admission: Durable Medical Equipment            Current DME: Wheelchair, Walker            Type of Home Care services:  None    ADLS  Prior functional level: Assistance with the following:, Dressing, Toileting, Bathing, Mobility  Current functional level: Assistance with the following:, Bathing, Dressing, Toileting, Mobility    Family can provide assistance at DC: No  Would you like Case Management to discuss

## 2023-06-21 PROCEDURE — 97535 SELF CARE MNGMENT TRAINING: CPT

## 2023-06-21 PROCEDURE — 6370000000 HC RX 637 (ALT 250 FOR IP): Performed by: NURSE PRACTITIONER

## 2023-06-21 PROCEDURE — 99232 SBSQ HOSP IP/OBS MODERATE 35: CPT | Performed by: PHYSICIAN ASSISTANT

## 2023-06-21 PROCEDURE — 97166 OT EVAL MOD COMPLEX 45 MIN: CPT

## 2023-06-21 PROCEDURE — 2580000003 HC RX 258: Performed by: NURSE PRACTITIONER

## 2023-06-21 PROCEDURE — 1200000000 HC SEMI PRIVATE

## 2023-06-21 PROCEDURE — 97162 PT EVAL MOD COMPLEX 30 MIN: CPT

## 2023-06-21 PROCEDURE — 97110 THERAPEUTIC EXERCISES: CPT

## 2023-06-21 RX ADMIN — PANTOPRAZOLE SODIUM 40 MG: 40 TABLET, DELAYED RELEASE ORAL at 05:49

## 2023-06-21 RX ADMIN — METOPROLOL TARTRATE 37.5 MG: 25 TABLET, FILM COATED ORAL at 18:15

## 2023-06-21 RX ADMIN — GABAPENTIN 100 MG: 100 CAPSULE ORAL at 15:02

## 2023-06-21 RX ADMIN — ACETAMINOPHEN 650 MG: 325 TABLET ORAL at 16:18

## 2023-06-21 RX ADMIN — SODIUM CHLORIDE, PRESERVATIVE FREE 10 ML: 5 INJECTION INTRAVENOUS at 07:48

## 2023-06-21 RX ADMIN — GABAPENTIN 100 MG: 100 CAPSULE ORAL at 20:55

## 2023-06-21 RX ADMIN — POLYETHYLENE GLYCOL 3350 17 G: 17 POWDER, FOR SOLUTION ORAL at 16:18

## 2023-06-21 RX ADMIN — TIMOLOL MALEATE 1 DROP: 5 SOLUTION OPHTHALMIC at 20:56

## 2023-06-21 RX ADMIN — GABAPENTIN 100 MG: 100 CAPSULE ORAL at 07:48

## 2023-06-21 RX ADMIN — LOSARTAN POTASSIUM 25 MG: 25 TABLET, FILM COATED ORAL at 20:56

## 2023-06-21 RX ADMIN — QUETIAPINE FUMARATE 25 MG: 25 TABLET ORAL at 20:56

## 2023-06-21 RX ADMIN — TIMOLOL MALEATE 1 DROP: 5 SOLUTION OPHTHALMIC at 07:49

## 2023-06-21 RX ADMIN — ISOSORBIDE MONONITRATE 30 MG: 30 TABLET, EXTENDED RELEASE ORAL at 20:55

## 2023-06-21 RX ADMIN — ACETAMINOPHEN 650 MG: 325 TABLET ORAL at 05:54

## 2023-06-21 RX ADMIN — ACETAMINOPHEN 650 MG: 325 TABLET ORAL at 23:02

## 2023-06-21 RX ADMIN — SODIUM CHLORIDE, PRESERVATIVE FREE 10 ML: 5 INJECTION INTRAVENOUS at 20:56

## 2023-06-21 RX ADMIN — MIRTAZAPINE 7.5 MG: 7.5 TABLET ORAL at 20:55

## 2023-06-21 ASSESSMENT — PAIN SCALES - WONG BAKER
WONGBAKER_NUMERICALRESPONSE: 8

## 2023-06-21 ASSESSMENT — PAIN SCALES - GENERAL: PAINLEVEL_OUTOF10: 0

## 2023-06-21 ASSESSMENT — PAIN DESCRIPTION - ORIENTATION: ORIENTATION: MID

## 2023-06-21 ASSESSMENT — PAIN - FUNCTIONAL ASSESSMENT: PAIN_FUNCTIONAL_ASSESSMENT: ACTIVITIES ARE NOT PREVENTED

## 2023-06-21 ASSESSMENT — PAIN DESCRIPTION - DESCRIPTORS: DESCRIPTORS: ACHING;DISCOMFORT

## 2023-06-21 ASSESSMENT — PAIN DESCRIPTION - LOCATION: LOCATION: BACK

## 2023-06-21 NOTE — CARE COORDINATION
6/21/23, 2:40 PM EDT    DISCHARGE ON GOING EVALUATION    Negro Boogie day: 2  Location: [address] [place] [address]-A Reason for admit: Intractable low back pain [M54.59]  Compression fracture of T6 vertebra, initial encounter [facility]) [S22.050A]   Procedure: 6-20-23 Intercostal nerve block  Barriers to Discharge: Nerve block yesterday in IR. Was able to ambulate calderon today. PT/OT. SW following for discharge disposition. PCP: Alicia Schultz DO  Readmission Risk Score: 14.7%  Patient Goals/Plan/Treatment Preferences: From [facility]. Family would like skilled care now. SW following.

## 2023-06-21 NOTE — CARE COORDINATION
6/21/23, 11:22 AM EDT    DISCHARGE PLANNING EVALUATION    This SW spoke with patient's daughter earlier in the morning and she has decided that patient needs a higher level of care than the AL and would like to discuss Tellyliv Nain going to the memory care unit. SW will reach out to Emma at the facility and see what the next steps are for transfer. DANNY did attempt a call to Emma at [facility], she is at lunch break at this time. SW will attempt later in afternoon. 2:31 PM- DANNY did speak with Cody Leiva at [facility], he is agreeable to patient coming over to memory care unit. They will still need to precert her, but he will start it as soon as OT has their note in from today. Updated daughter Myke Bonilla over the phone.

## 2023-06-21 NOTE — PROGRESS NOTES
[facility]  INPATIENT OCCUPATIONAL THERAPY  STRZ MED SURG 8B  EVALUATION    Time:   Time In:   Time Out: 1507  Timed Code Treatment Minutes: 15 Minutes  Minutes: 24          Date: 2023  Patient Name: Best Comer,   Gender: female      MRN: [id number]  : 1928  (80 y.o.)  Referring Practitioner: GABRIELA Garay CNP  Diagnosis: Intractable Low Back Pain  Additional Pertinent Hx: Best Comer is a 80 y.o. female admitted to [address] [place] [address] on 2023. This patient with a medical history significant for coronary artery disease, hypertension, GERD, glaucoma, dementia, hearing loss, degenerative disc disease, osteoporosis, history of L1 and T11 compression fracture status post kyphoplasty who presented to [facility] with chief complaint of upper back pain that has been increasing over the past couple weeks without injury, trauma, or falls. Work up was completed and patient had a thoracic MRI which showed an acute compression fracture at T6. Internal medicine consulted interventional radiology team to evaluate for Kyphoplasty. Dr. Oscar Sanchez with interventional radiology evaluated patient on 2023 and patient had minimal tenderness at T6 and he determined that she was not a candidate for augmentation and recommended an intercostal nerve block as pain is lower in thoracic spine and spanning over ribs. Pt underwent the intercostal nerve block on 23. Restrictions/Precautions:  Restrictions/Precautions: Fall Risk, General Precautions  Position Activity Restriction  Other position/activity restrictions: h/o dementia    Subjective  Chart Reviewed: Yes, Orders, Progress Notes  Patient assessed for rehabilitation services?: Yes    Subjective: RN okayed session. Pt was getting up to bathroom with tech upon arrival, pleasant and cooperatvie.     Pain: Denies    Vitals: Vitals not assessed per clinical judgement, see nursing flowsheet    Social/Functional History:  Lives With:

## 2023-06-21 NOTE — PROGRESS NOTES
Hospitalist Progress Note      Patient: Cindy Bean    Unit/Bed:8B-34/034-A  YOB: 1928  MRN: [id number]   Acct: [de-identified]   PCP: Terese Beltran DO  Date of Admission: 6/19/2023    Assessment/Plan:    Intractable left mid/upper back pain--Pain Management consulted. IR consulted; intercostal nerve block on 6/20. Pt walked in the hallway today. PT/OT. Compression fracture of T6 with 50% height loss, possibly secondary to osteoporosis--Per IR not a candidate for vertebroplasty/augmentation as no pain at that level  Hyponatremia, mild  Degenerative disc disease  History of L1 and T11 compression fracture status post kyphoplasty, please secondary to osteoporosis  Osteoporosis  Primary hypertension, uncontrolled--Imdur, Cozaar, Lopressor; monitor  Iron deficiency anemia--CBC is normal along with MCV and MCH  GERD--does not appear to be on any medications  CAD status post PTCA February 2, 2015--aspirin and Plavix are currently on hold in case procedures to be done; on Imdur  Glaucoma  CODE STATUS--DNR comfort care    Dispo: Pt is from AL, there is concern that she needs a higher level of care. Chief Complaint: Upper Back Pain     Initial H and P:-     H&P done 6/19/2023: Daryl Landin is a 80 y.o. female with PMHx that includes: Coronary artery disease, hypertension, GERD, glaucoma, degenerative disc disease, osteoporosis, history of L1 and T11 compression fracture status post kyphoplasty who presented to [facility] with chief complaint of upper back pain. Patient is alert but very hard of hearing even with hearing aids and baseline dementia. HPI obtained from daughter at bedside. Daughter states patient resides at assisted living with . Patient began complaining of mid upper back pain about a week ago, no known falls, trauma, injury.   Daughter states patient has been having more periods of tiredness and fatigue that

## 2023-06-21 NOTE — PROGRESS NOTES
[address] [address]  INPATIENT PHYSICAL THERAPY  EVALUATION  STRZ MED SURG 8B - 8B-34/034-A    Time In:   Time Out: 0933  Timed Code Treatment Minutes: 10 Minutes  Minutes: 25        Date: 2023  Patient Name: José Miguel Bowman,  Gender:  female        MRN: [id number]  : 1928  (80 y.o.)      Referring Practitioner: GABRIELA Crespo CNP  Diagnosis: Intractable low back pain  Additional Pertinent Hx: José Miguel Bowman is a 80 y.o. female admitted to [address] [place] [address] on 2023. This patient with a medical history significant for coronary artery disease, hypertension, GERD, glaucoma, dementia, hearing loss, degenerative disc disease, osteoporosis, history of L1 and T11 compression fracture status post kyphoplasty who presented to [facility] with chief complaint of upper back pain that has been increasing over the past couple weeks without injury, trauma, or falls. Work up was completed and patient had a thoracic MRI which showed an acute compression fracture at T6. Internal medicine consulted interventional radiology team to evaluate for Kyphoplasty. Dr. Elder Clarity with interventional radiology evaluated patient on 2023 and patient had minimal tenderness at T6 and he determined that she was not a candidate for augmentation and recommended an intercostal nerve block as pain is lower in thoracic spine and spanning over ribs. Pt underwent the intercostal nerve block on 23. Restrictions/Precautions:  Restrictions/Precautions: Fall Risk, General Precautions  Position Activity Restriction  Other position/activity restrictions: h/o dementia    Subjective:  Chart Reviewed: Yes  Patient assessed for rehabilitation services?: Yes  Family / Caregiver Present: Yes (Dtr)  Subjective: RN approved session, patient in bed but appeared uncomfortable. Dtr present and provided information regarding patient's PLOF. RN applied lidoderm patches to back prior to session.  Pt wanting to get out of

## 2023-06-21 NOTE — PLAN OF CARE
Problem: Discharge Planning  Goal: Discharge to home or other facility with appropriate resources  Outcome: Progressing  Flowsheets (Taken 6/21/2023 1006)  Discharge to home or other facility with appropriate resources: Identify barriers to discharge with patient and caregiver  Note: Pt. POA stated family is wanting pt. To be transferred to Dementia unit at [facility] upon discharge. Social work involved. Problem: Pain  Goal: Verbalizes/displays adequate comfort level or baseline comfort level  Outcome: Progressing  Flowsheets (Taken 6/21/2023 1006)  Verbalizes/displays adequate comfort level or baseline comfort level: Encourage patient to monitor pain and request assistance  Note: Pt. Verbalizing no pain. Pt. Unable to understand numeric pain scale and pain goal due to confusion. PRN tylenol and Lidocaine patches available per physician order. Frequent repositioning and heating pad provided for pt. Problem: Safety - Adult  Goal: Free from fall injury  Outcome: Progressing  Flowsheets (Taken 6/21/2023 1006)  Free From Fall Injury: Instruct family/caregiver on patient safety  Note: Pt. Free from falls during current hospitalization. Call light within reach and chair alarm on. Frequent rounding provided for patient due to frequent confusion. Problem: ABCDS Injury Assessment  Goal: Absence of physical injury  Outcome: Progressing  Flowsheets (Taken 6/21/2023 1006)  Absence of Physical Injury: Implement safety measures based on patient assessment  Note: Pt. Free from injury during current hospitalization. Call light within reach and chair alarm on. Frequent rounding provided for the patient due to pt. Confusion. Care plan reviewed with patient. Patient verbalizes understanding of the plan of care and contributes to goal setting.

## 2023-06-22 PROCEDURE — 2580000003 HC RX 258: Performed by: NURSE PRACTITIONER

## 2023-06-22 PROCEDURE — 1200000000 HC SEMI PRIVATE

## 2023-06-22 PROCEDURE — 97116 GAIT TRAINING THERAPY: CPT

## 2023-06-22 PROCEDURE — 6370000000 HC RX 637 (ALT 250 FOR IP): Performed by: NURSE PRACTITIONER

## 2023-06-22 PROCEDURE — 99232 SBSQ HOSP IP/OBS MODERATE 35: CPT | Performed by: PHYSICIAN ASSISTANT

## 2023-06-22 PROCEDURE — 97110 THERAPEUTIC EXERCISES: CPT

## 2023-06-22 RX ORDER — QUETIAPINE FUMARATE 25 MG/1
25 TABLET, FILM COATED ORAL NIGHTLY
Qty: 60 TABLET | Refills: 3 | DISCHARGE
Start: 2023-06-22

## 2023-06-22 RX ORDER — ISOSORBIDE MONONITRATE 30 MG/1
30 TABLET, EXTENDED RELEASE ORAL NIGHTLY
Qty: 30 TABLET | Refills: 3 | DISCHARGE
Start: 2023-06-22

## 2023-06-22 RX ADMIN — LOSARTAN POTASSIUM 25 MG: 25 TABLET, FILM COATED ORAL at 20:33

## 2023-06-22 RX ADMIN — GABAPENTIN 100 MG: 100 CAPSULE ORAL at 20:33

## 2023-06-22 RX ADMIN — GABAPENTIN 100 MG: 100 CAPSULE ORAL at 14:51

## 2023-06-22 RX ADMIN — GABAPENTIN 100 MG: 100 CAPSULE ORAL at 08:05

## 2023-06-22 RX ADMIN — METOPROLOL TARTRATE 37.5 MG: 25 TABLET, FILM COATED ORAL at 20:33

## 2023-06-22 RX ADMIN — ATORVASTATIN CALCIUM 10 MG: 10 TABLET, FILM COATED ORAL at 20:33

## 2023-06-22 RX ADMIN — QUETIAPINE FUMARATE 25 MG: 25 TABLET ORAL at 20:33

## 2023-06-22 RX ADMIN — ACETAMINOPHEN 650 MG: 325 TABLET ORAL at 05:52

## 2023-06-22 RX ADMIN — SODIUM CHLORIDE, PRESERVATIVE FREE 10 ML: 5 INJECTION INTRAVENOUS at 20:33

## 2023-06-22 RX ADMIN — PANTOPRAZOLE SODIUM 40 MG: 40 TABLET, DELAYED RELEASE ORAL at 05:52

## 2023-06-22 RX ADMIN — ISOSORBIDE MONONITRATE 30 MG: 30 TABLET, EXTENDED RELEASE ORAL at 20:33

## 2023-06-22 RX ADMIN — MIRTAZAPINE 7.5 MG: 7.5 TABLET ORAL at 20:33

## 2023-06-22 RX ADMIN — TIMOLOL MALEATE 1 DROP: 5 SOLUTION OPHTHALMIC at 08:06

## 2023-06-22 RX ADMIN — TIMOLOL MALEATE 1 DROP: 5 SOLUTION OPHTHALMIC at 20:33

## 2023-06-22 ASSESSMENT — PAIN DESCRIPTION - ORIENTATION: ORIENTATION: LOWER

## 2023-06-22 ASSESSMENT — PAIN - FUNCTIONAL ASSESSMENT: PAIN_FUNCTIONAL_ASSESSMENT: ACTIVITIES ARE NOT PREVENTED

## 2023-06-22 ASSESSMENT — PAIN SCALES - GENERAL
PAINLEVEL_OUTOF10: 6
PAINLEVEL_OUTOF10: 0
PAINLEVEL_OUTOF10: 0

## 2023-06-22 ASSESSMENT — PAIN DESCRIPTION - DESCRIPTORS: DESCRIPTORS: ACHING

## 2023-06-22 ASSESSMENT — PAIN DESCRIPTION - LOCATION: LOCATION: BACK

## 2023-06-22 NOTE — CARE COORDINATION
6/22/23, 12:38 PM EDT    DISCHARGE PLANNING EVALUATION    DANNY spoke with Reji Cardenas from [facility], he reports that her precert was started this morning and he will call DANNY when it is approved.

## 2023-06-22 NOTE — PROGRESS NOTES
Hospitalist Progress Note      Patient: Pérez Penny    Unit/Bed:8B-34/034-A  YOB: 1928  MRN: [id number]   Acct: [de-identified]   PCP: Galilea Ortega DO  Date of Admission: 6/19/2023    Assessment/Plan:    Intractable left mid/upper back pain--Pain Management consulted. IR consulted; intercostal nerve block on 6/20. Pt walked in the hallway on 6/21. PT/OT. Compression fracture of T6 with 50% height loss, possibly secondary to osteoporosis--Per IR not a candidate for vertebroplasty/augmentation as no pain at that level  Hyponatremia, mild  Degenerative disc disease  History of L1 and T11 compression fracture status post kyphoplasty, please secondary to osteoporosis  Osteoporosis  Primary hypertension, uncontrolled--Imdur, Cozaar, Lopressor; monitor  Iron deficiency anemia--CBC is normal along with MCV and MCH  GERD--does not appear to be on any medications  CAD status post PTCA February 2, 2015--aspirin and Plavix are currently on hold in case procedures to be done; on Imdur  Glaucoma  CODE STATUS--DNR comfort care    Dispo: Pre-cert for Memory Care    Chief Complaint: Upper Back Pain     Initial H and P:-     H&P done 6/19/2023: Jesu Dang is a 80 y.o. female with PMHx that includes: Coronary artery disease, hypertension, GERD, glaucoma, degenerative disc disease, osteoporosis, history of L1 and T11 compression fracture status post kyphoplasty who presented to [address] [place] [address] with chief complaint of upper back pain. Patient is alert but very hard of hearing even with hearing aids and baseline dementia. HPI obtained from daughter at bedside. Daughter states patient resides at assisted living with . Patient began complaining of mid upper back pain about a week ago, no known falls, trauma, injury.   Daughter states patient has been having more periods of tiredness and fatigue that are intermittent otherwise has been in her

## 2023-06-22 NOTE — CARE COORDINATION
6/22/23, 1:53 PM EDT    DISCHARGE ON GOING EVALUATION    Sahrarhinamatthias Vamshi day: 3  Location: [address] [place], [place] [address]A Reason for admit: Intractable low back pain [M54.59]  Compression fracture of T6 vertebra, initial encounter [facility]) [S22.050A]   Procedure: 6-20-23 Intercostal nerve block  Barriers to Discharge: Await precert for [facility]. SW following this. PCP: Darya Lara DO  Readmission Risk Score: 14.7%  Patient Goals/Plan/Treatment Preferences: Monica Cifuentes pending precert.

## 2023-06-22 NOTE — PROGRESS NOTES
Arley Barber 60  OCCUPATIONAL THERAPY MISSED TREATMENT NOTE  [facility] MED SURG 8B  8B-34/034-A      Date: 2023  Patient Name: Ally Hill        CSN: [id number]   : 1928  (80 y.o.)  Gender: female   Referring Practitioner: GABRIELA Garcia CNP  Diagnosis: Intractable Low Back Pain         REASON FOR MISSED TREATMENT: patient with PT upon arrival.  Will attempt next available time.

## 2023-06-22 NOTE — PROGRESS NOTES
[address] [address]  INPATIENT PHYSICAL THERAPY  DAILY NOTE  [facility] MED SURG 8B - 8B-34/034-A    Time In:   Time Out: 0415  Timed Code Treatment Minutes: 31 Minutes  Minutes: 31          Date: 2023  Patient Name: Sukhdev Mccauley,  Gender:  female        MRN: [id number]  : 1928  (80 y.o.)     Referring Practitioner: GABRIELA Lozano CNP  Diagnosis: Intractable low back pain  Additional Pertinent Hx: Sukhdev Mccauley is a 80 y.o. female admitted to [address] [address] on 2023. This patient with a medical history significant for coronary artery disease, hypertension, GERD, glaucoma, dementia, hearing loss, degenerative disc disease, osteoporosis, history of L1 and T11 compression fracture status post kyphoplasty who presented to [facility] with chief complaint of upper back pain that has been increasing over the past couple weeks without injury, trauma, or falls. Work up was completed and patient had a thoracic MRI which showed an acute compression fracture at T6. Internal medicine consulted interventional radiology team to evaluate for Kyphoplasty. Dr. Rose Campos with interventional radiology evaluated patient on 2023 and patient had minimal tenderness at T6 and he determined that she was not a candidate for augmentation and recommended an intercostal nerve block as pain is lower in thoracic spine and spanning over ribs. Pt underwent the intercostal nerve block on 23.      Prior Level of Function:  Lives With: Spouse  Type of Home: Assisted living  Home Layout: One level  Home Access: Level entry  Home Equipment: Walker, rolling   Bathroom Shower/Tub: Walk-in shower  Bathroom Toilet: Handicap height  Bathroom Equipment: Grab bars in shower  Bathroom Accessibility: Accessible    Receives Help From: Family  ADL Assistance: Needs assistance  Ambulation Assistance: Independent  Transfer Assistance: Independent  Additional Comments: Daughter reports patient lives in an [address] [place] [address]

## 2023-06-22 NOTE — PLAN OF CARE
Problem: Pain  Goal: Verbalizes/displays adequate comfort level or baseline comfort level  6/21/2023 2222 by Pham Morales RN  Outcome: Progressing  6/21/2023 1006 by Scarlet Mansfield RN  Outcome: Progressing  Flowsheets (Taken 6/21/2023 1006)  Verbalizes/displays adequate comfort level or baseline comfort level: Encourage patient to monitor pain and request assistance  Note: Pt. Verbalizing no pain. Pt. Unable to understand numeric pain scale and pain goal due to confusion. PRN tylenol and Lidocaine patches available per physician order. Frequent repositioning and heating pad provided for pt. Problem: Safety - Adult  Goal: Free from fall injury  6/21/2023 2222 by Pham Morales RN  Outcome: Progressing  6/21/2023 1006 by Scarlet Mansfield RN  Outcome: Progressing  Flowsheets (Taken 6/21/2023 1006)  Free From Fall Injury: Instruct family/caregiver on patient safety  Note: Pt. Free from falls during current hospitalization. Call light within reach and chair alarm on. Frequent rounding provided for patient due to frequent confusion. Problem: ABCDS Injury Assessment  Goal: Absence of physical injury  6/21/2023 2222 by Pham Morales RN  Outcome: Progressing  6/21/2023 1006 by Scarlet Mansfield RN  Outcome: Progressing  Flowsheets (Taken 6/21/2023 1006)  Absence of Physical Injury: Implement safety measures based on patient assessment  Note: Pt. Free from injury during current hospitalization. Call light within reach and chair alarm on. Frequent rounding provided for the patient due to pt. Confusion. Care plan reviewed with patient. Patient verbalize understanding of the plan of care and contribute to goal setting.

## 2023-06-22 NOTE — DISCHARGE INSTR - COC
Continuity of Care Form    Patient Name: Yonathan Clinton   :  1928  MRN:  [id number]    Admit date:  2023  Discharge date:  23    Code Status Order: [facility]   Advance Directives:     Admitting Physician:  No admitting provider for patient encounter.   PCP: Darya Lara DO    Discharging Nurse: Claven Closs Unit/Room#: 8B-34/034-A  Discharging Unit Phone Number: [phone]    Emergency Contact:   Extended Emergency Contact Information  Primary Emergency Contact: Rachnaneby [address] Phone: [phone]  Relation: Child  Secondary Emergency Contact: Chris Chamberlain  Address: [address] [place] [id number] [address] Phone: [phone]  Relation: Spouse    Past Surgical History:  Past Surgical History:   Procedure Laterality Date    [address]      stent x1    COLON SURGERY  1994    COLONOSCOPY  2005    EYE SURGERY      left corneal transplant    EYE SURGERY      right corneal transplant    EYE SURGERY      bilateral cataract    HC PAIN BLOCK Left 2018    Synvisc ONE LEFT knee performed by Nithya Parker MD at [address] [place] [address] ([facility])  1969    hysterectomy    KYPHOSIS SURGERY      L-1 compression fracture    MIDDLE EAR SURGERY      right tympnaoplasty by dr Katarina Barboza Right 2013       Immunization History:   Immunization History   Administered Date(s) Administered    COVID-19, PFIZER Bivalent, DO NOT Dilute, (age 12y+), IM, 30 mcg/0.3 mL 05/10/2023    COVID-19, PFIZER PURPLE top, DILUTE for use, (age 15 y+), 30mcg/0.3mL 2021, 2021    Influenza Virus Vaccine 10/07/2021    Influenza, High Dose (Fluzone 65 yrs and older) 2017, 2018, 2020    Influenza, Triv, inactivated, subunit, adjuvanted, IM (Fluad 65 yrs and older) 10/09/2018    Pneumococcal, PCV-13, PREVNAR 13,

## 2023-06-23 PROBLEM — S22.050A COMPRESSION FRACTURE OF T6 VERTEBRA (HCC): Status: ACTIVE | Noted: 2023-06-23

## 2023-06-23 PROCEDURE — 97110 THERAPEUTIC EXERCISES: CPT

## 2023-06-23 PROCEDURE — 2580000003 HC RX 258: Performed by: NURSE PRACTITIONER

## 2023-06-23 PROCEDURE — 6370000000 HC RX 637 (ALT 250 FOR IP): Performed by: NURSE PRACTITIONER

## 2023-06-23 PROCEDURE — 6370000000 HC RX 637 (ALT 250 FOR IP): Performed by: INTERNAL MEDICINE

## 2023-06-23 PROCEDURE — 97535 SELF CARE MNGMENT TRAINING: CPT

## 2023-06-23 PROCEDURE — 1200000000 HC SEMI PRIVATE

## 2023-06-23 RX ORDER — BISACODYL 5 MG/1
5 TABLET, DELAYED RELEASE ORAL DAILY PRN
Status: DISCONTINUED | OUTPATIENT
Start: 2023-06-23 | End: 2023-06-24 | Stop reason: HOSPADM

## 2023-06-23 RX ORDER — BISACODYL 10 MG
10 SUPPOSITORY, RECTAL RECTAL PRN
Status: DISCONTINUED | OUTPATIENT
Start: 2023-06-23 | End: 2023-06-24 | Stop reason: HOSPADM

## 2023-06-23 RX ORDER — BISACODYL 5 MG/1
5 TABLET, DELAYED RELEASE ORAL DAILY PRN
Status: CANCELLED | OUTPATIENT
Start: 2023-06-23

## 2023-06-23 RX ORDER — POLYETHYLENE GLYCOL 3350 17 G/17G
17 POWDER, FOR SOLUTION ORAL DAILY
Status: DISCONTINUED | OUTPATIENT
Start: 2023-06-23 | End: 2023-06-24 | Stop reason: HOSPADM

## 2023-06-23 RX ADMIN — GABAPENTIN 100 MG: 100 CAPSULE ORAL at 21:06

## 2023-06-23 RX ADMIN — QUETIAPINE FUMARATE 25 MG: 25 TABLET ORAL at 21:07

## 2023-06-23 RX ADMIN — TIMOLOL MALEATE 1 DROP: 5 SOLUTION OPHTHALMIC at 08:26

## 2023-06-23 RX ADMIN — LOSARTAN POTASSIUM 25 MG: 25 TABLET, FILM COATED ORAL at 21:07

## 2023-06-23 RX ADMIN — ISOSORBIDE MONONITRATE 30 MG: 30 TABLET, EXTENDED RELEASE ORAL at 21:07

## 2023-06-23 RX ADMIN — MENTHOL, METHYL SALICYLATE: 10; 15 CREAM TOPICAL at 23:23

## 2023-06-23 RX ADMIN — ACETAMINOPHEN 650 MG: 325 TABLET ORAL at 23:23

## 2023-06-23 RX ADMIN — SODIUM CHLORIDE, PRESERVATIVE FREE 10 ML: 5 INJECTION INTRAVENOUS at 21:08

## 2023-06-23 RX ADMIN — TIMOLOL MALEATE 1 DROP: 5 SOLUTION OPHTHALMIC at 21:09

## 2023-06-23 RX ADMIN — PANTOPRAZOLE SODIUM 40 MG: 40 TABLET, DELAYED RELEASE ORAL at 04:06

## 2023-06-23 RX ADMIN — MIRTAZAPINE 7.5 MG: 7.5 TABLET ORAL at 21:07

## 2023-06-23 RX ADMIN — GABAPENTIN 100 MG: 100 CAPSULE ORAL at 08:26

## 2023-06-23 RX ADMIN — POLYETHYLENE GLYCOL 3350 17 G: 17 POWDER, FOR SOLUTION ORAL at 08:26

## 2023-06-23 RX ADMIN — ATORVASTATIN CALCIUM 10 MG: 10 TABLET, FILM COATED ORAL at 21:07

## 2023-06-23 RX ADMIN — CLOPIDOGREL BISULFATE 75 MG: 75 TABLET ORAL at 08:26

## 2023-06-23 RX ADMIN — METOPROLOL TARTRATE 37.5 MG: 25 TABLET, FILM COATED ORAL at 21:07

## 2023-06-23 RX ADMIN — ACETAMINOPHEN 650 MG: 325 TABLET ORAL at 04:05

## 2023-06-23 ASSESSMENT — PAIN - FUNCTIONAL ASSESSMENT: PAIN_FUNCTIONAL_ASSESSMENT: ACTIVITIES ARE NOT PREVENTED

## 2023-06-23 ASSESSMENT — PAIN DESCRIPTION - DESCRIPTORS
DESCRIPTORS: ACHING
DESCRIPTORS: ACHING

## 2023-06-23 ASSESSMENT — PAIN DESCRIPTION - LOCATION
LOCATION: BACK
LOCATION: BACK

## 2023-06-23 ASSESSMENT — PAIN SCALES - WONG BAKER: WONGBAKER_NUMERICALRESPONSE: 0

## 2023-06-23 ASSESSMENT — PAIN SCALES - GENERAL
PAINLEVEL_OUTOF10: 4
PAINLEVEL_OUTOF10: 2
PAINLEVEL_OUTOF10: 0

## 2023-06-23 ASSESSMENT — PAIN DESCRIPTION - ORIENTATION
ORIENTATION: LOWER
ORIENTATION: LOWER;MID

## 2023-06-23 NOTE — PROGRESS NOTES
Attempting to call son at this time for transportation back home. Directed to voicemail with no option to leave message.

## 2023-06-23 NOTE — CARE COORDINATION
6/23/23, 11:35 AM EDT    DISCHARGE PLANNING EVALUATION    Spoke with [employer] representative for [place], insurance is requesting peer to peer by 2:15 today, phone number [phone], option 2. Spoke with physician, who plans to call for peer to peer, [facility].

## 2023-06-23 NOTE — PROGRESS NOTES
Assessment and Plan:        Back pain:  due to compression fxs; had nerve block. Still pain. Pain mge seeing. On lidoderm patches, tylenol  CAD : s/p stent long ago; on DAPT; will stop asa to reduce bleeding risks. CC:  back pain  HPI:  pt with chronic compression fxs, OA, CAD brought to ER with severe back pain. Had nerve block. Still some pain. ?benefit from support? 2  ROS (12 point review of systems completed. Pertinent positives noted.  Otherwise ROS is negative) :   PMH:  Per HPI  SHX:    FHX: Noncontributory    Allergies: See above    Medications:     sodium chloride        polyethylene glycol  17 g Oral Daily    lidocaine  3 patch TransDERmal Daily    QUEtiapine  25 mg Oral Nightly    atorvastatin  10 mg Oral Nightly    clopidogrel  75 mg Oral Daily    metoprolol tartrate  37.5 mg Oral QPM    pantoprazole  40 mg Oral QAM AC    timolol  1 drop Both Eyes BID    isosorbide mononitrate  30 mg Oral Nightly    losartan  25 mg Oral Nightly    sodium chloride flush  5-40 mL IntraVENous 2 times per day    mirtazapine  7.5 mg Oral Nightly    gabapentin  100 mg Oral TID       Vital Signs:   /61   Pulse 69   Temp 98.1 °F (36.7 °C) (Oral)   Resp 16   Ht 5' (1.524 m)   Wt 136 lb 14.5 oz (62.1 kg)   SpO2 95%   BMI 26.74 kg/m²      Intake/Output Summary (Last 24 hours) at 6/23/2023 4402  Last data filed at 6/23/2023 0334  Gross per 24 hour   Intake 960 ml   Output --   Net 960 ml        Physical Examination: General appearance - chronically ill appearing  Mental status - not oriente  Neck - supple, no significant adenopathy, no JVD, or carotid bruits  Chest - clear to auscultation, no wheezes, rales or rhonchi, symmetric air entry  Heart - normal rate, regular rhythm, normal S1, S2, no murmurs, rubs, clicks or gallops  Abdomen - soft, nontender, nondistended, no masses or organomegaly  Neurological - neck supple without rigidity, no focal findings  Musculoskeletal -  when

## 2023-06-23 NOTE — PLAN OF CARE
Problem: Discharge Planning  Goal: Discharge to home or other facility with appropriate resources  6/23/2023 1601 by Mikaela Rhodes RN  Outcome: Adequate for Discharge  6/23/2023 1600 by Mikaela Rhodes RN  Outcome: Adequate for Discharge     Problem: Pain  Goal: Verbalizes/displays adequate comfort level or baseline comfort level  6/23/2023 1601 by Mikaela Rhodes RN  Outcome: Adequate for Discharge  6/23/2023 1600 by Mikaela Rhodes RN  Outcome: Adequate for Discharge     Problem: Safety - Adult  Goal: Free from fall injury  6/23/2023 1601 by Mikaela Rhodes RN  Outcome: Adequate for Discharge  6/23/2023 1600 by Mikaela Rhodes RN  Outcome: Adequate for Discharge     Problem: ABCDS Injury Assessment  Goal: Absence of physical injury  6/23/2023 1601 by Mikaela Rhodes RN  Outcome: Adequate for Discharge  6/23/2023 1600 by Mikaela Rhodes RN  Outcome: Adequate for Discharge

## 2023-06-23 NOTE — PROGRESS NOTES
Arley Barber 60  PHYSICAL THERAPY MISSED TREATMENT NOTE  [facility] MED SURG 8B    Date: 2023  Patient Name: Ally Hill        MRN: [id number]   : 1928  (80 y.o.)  Gender: female   Referring Practitioner: GABRIELA Garcia CNP  Diagnosis: Intractable low back pain         REASON FOR MISSED TREATMENT:  Missed Treat. Pt politely declined mobility due to being too tired and not feeling very good after eating and having visitors. Pt requesting to just rest in the recliner. Alert and interactive, no focal deficits

## 2023-06-23 NOTE — FLOWSHEET NOTE
06/23/23 0444   Treatment Team Notification   Reason for Communication Review case   Team Member Notified [facility]   Treatment Team Role Advanced Practice Nurse   Method of Communication Secure Message   Response Waiting for response     Paged [facility] CNP regarding patient having urinary frequency for the past few hours. Patient was bladder scanned for 0.

## 2023-06-23 NOTE — CARE COORDINATION
6/23/23, 1:29 PM EDT    DISCHARGE ON GOING EVALUATION    Emma Garsia day: 4  Location: [address] [place] [address]A Reason for admit: Intractable low back pain [M54.59]  Compression fracture of T6 vertebra, initial encounter [facility]) [S22.050A]   Procedure: 6-20-23 Intercostal nerve block  Barriers to Discharge: Precert for Benita Cifuentes denied. SW following for disposition. PCP: Lyle Ferreira DO  Readmission Risk Score: 13.9%  Patient Goals/Plan/Treatment Preferences: Possible return to [address] [place] [address],[address]. Precert denied for Benita Cifuentes.

## 2023-06-23 NOTE — PLAN OF CARE
Problem: Discharge Planning  Goal: Discharge to home or other facility with appropriate resources  Outcome: Progressing     Problem: Pain  Goal: Verbalizes/displays adequate comfort level or baseline comfort level  Outcome: Progressing     Problem: Safety - Adult  Goal: Free from fall injury  Outcome: Progressing     Problem: ABCDS Injury Assessment  Goal: Absence of physical injury  Outcome: Progressing   Care plan reviewed with patient. Patient verbalize understanding of the plan of care and contribute to goal setting.

## 2023-06-23 NOTE — CARE COORDINATION
6/23/23, 4:40 PM EDT    DISCHARGE PLANNING EVALUATION    SW faxed LALO with request for level of Care to [facility]. Fax received for Medicaid level of care at [facility]. DANNY notified nurse, she will call family for transport. Call to [facility] with [facility] updated on level of care and plans for discharge tonight.     6/23/23, 4:41 PM EDT    Patient goals/plan/ treatment preferences discussed by  and . Patient goals/plan/ treatment preferences reviewed with patient/ family. Patient/ family verbalize understanding of discharge plan and are in agreement with goal/plan/treatment preferences. Understanding was demonstrated using the teach back method. AVS provided by RN at time of discharge, which includes all necessary medical information pertaining to the patients current course of illness, treatment, post-discharge goals of care, and treatment preferences.      Services At/After Discharge: [address]       IMM Letter  IMM Letter given to Patient/Family/Significant other/Guardian/POA/by[de-identified] Deanne SOLORZANO Case Manager  IMM Letter date given[de-identified] 06/23/23  IMM Letter time given[de-identified] 0927

## 2023-06-23 NOTE — PROGRESS NOTES
[address] [place] [address] 8B  Occupational Therapy  Daily Note  Time:   Time In:   Time Out: 0  Timed Code Treatment Minutes: 25 Minutes  Minutes: 25          Date: 2023  Patient Name: José Miguel Bowman,   Gender: female      Room: 8B-34/034-A  MRN: [id number]  : 1928  (80 y.o.)  Referring Practitioner: GABRIELA Crespo CNP  Diagnosis: Intractable Low Back Pain  Additional Pertinent Hx: José Miguel Bowman is a 80 y.o. female admitted to [facility] on 2023. This patient with a medical history significant for coronary artery disease, hypertension, GERD, glaucoma, dementia, hearing loss, degenerative disc disease, osteoporosis, history of L1 and T11 compression fracture status post kyphoplasty who presented to [facility] with chief complaint of upper back pain that has been increasing over the past couple weeks without injury, trauma, or falls. Work up was completed and patient had a thoracic MRI which showed an acute compression fracture at T6. Internal medicine consulted interventional radiology team to evaluate for Kyphoplasty. Dr. Elder Clarity with interventional radiology evaluated patient on 2023 and patient had minimal tenderness at T6 and he determined that she was not a candidate for augmentation and recommended an intercostal nerve block as pain is lower in thoracic spine and spanning over ribs. Pt underwent the intercostal nerve block on 23. Restrictions/Precautions:  Restrictions/Precautions: Fall Risk, General Precautions  Position Activity Restriction  Other position/activity restrictions: h/o dementia     SUBJECTIVE: Pleasant and cooperative. RN approved session. Pt had just finished using the bathroom. She requested to not walk in the hallway until later today. PAIN: No number provided. Tenderness to touch reported at her L inferior scapula at T6 level.     Vitals: Vitals not assessed per clinical judgement, see nursing

## 2023-06-23 NOTE — CARE COORDINATION
6/23/23, 1:17 PM EDT    DISCHARGE PLANNING EVALUATION    Peer to peer denied, call to [facility] to determine if patient can return to assisted living.

## 2023-06-23 NOTE — CARE COORDINATION
6/23/23, 3:12 PM EDT    [address] will accept to [facility] under medicaid benefit, will need medicaid level of care for admission, need completed LALO for level of care, notified physician of need for LALO. Spoke with daughter. She is able to transport tomorrow, her brother could transport tonight if able to discharge    * need to complete medicaid level of care before discharge.

## 2023-06-24 VITALS
OXYGEN SATURATION: 94 % | DIASTOLIC BLOOD PRESSURE: 67 MMHG | SYSTOLIC BLOOD PRESSURE: 135 MMHG | HEART RATE: 74 BPM | TEMPERATURE: 98.5 F | WEIGHT: 136.91 LBS | RESPIRATION RATE: 18 BRPM | HEIGHT: 60 IN | BODY MASS INDEX: 26.88 KG/M2

## 2023-06-24 PROCEDURE — 6370000000 HC RX 637 (ALT 250 FOR IP): Performed by: NURSE PRACTITIONER

## 2023-06-24 PROCEDURE — 6370000000 HC RX 637 (ALT 250 FOR IP): Performed by: INTERNAL MEDICINE

## 2023-06-24 RX ADMIN — POLYETHYLENE GLYCOL 3350 17 G: 17 POWDER, FOR SOLUTION ORAL at 09:25

## 2023-06-24 RX ADMIN — PANTOPRAZOLE SODIUM 40 MG: 40 TABLET, DELAYED RELEASE ORAL at 06:44

## 2023-06-24 RX ADMIN — CLOPIDOGREL BISULFATE 75 MG: 75 TABLET ORAL at 09:25

## 2023-06-24 RX ADMIN — TIMOLOL MALEATE 1 DROP: 5 SOLUTION OPHTHALMIC at 09:25

## 2023-06-24 RX ADMIN — GABAPENTIN 100 MG: 100 CAPSULE ORAL at 09:25

## 2023-06-24 ASSESSMENT — PAIN SCALES - GENERAL
PAINLEVEL_OUTOF10: 0
PAINLEVEL_OUTOF10: 0

## 2023-06-24 NOTE — PLAN OF CARE
Problem: Discharge Planning  Goal: Discharge to home or other facility with appropriate resources  Outcome: Adequate for Discharge  Flowsheets (Taken 6/24/2023 0900)  Discharge to home or other facility with appropriate resources:   Identify barriers to discharge with patient and caregiver   Arrange for needed discharge resources and transportation as appropriate     Problem: Pain  Goal: Verbalizes/displays adequate comfort level or baseline comfort level  Outcome: Adequate for Discharge  Flowsheets (Taken 6/24/2023 0908)  Verbalizes/displays adequate comfort level or baseline comfort level:   Encourage patient to monitor pain and request assistance   Assess pain using appropriate pain scale   Administer analgesics based on type and severity of pain and evaluate response   Implement non-pharmacological measures as appropriate and evaluate response     Problem: Safety - Adult  Goal: Free from fall injury  Outcome: Adequate for Discharge     Problem: ABCDS Injury Assessment  Goal: Absence of physical injury  Outcome: Adequate for Discharge

## 2023-06-24 NOTE — DISCHARGE SUMMARY
Discharge Summary    Patient: Yonathan Clinton  YOB: 1928    MRN: [id number]   Acct: [de-identified]    Primary Care Physician: Darya Lara DO    Admit date:  6/19/2023    Discharge date:  6/24/2023       Discharge Diagnoses:   Intractable low back pain  Principal Problem:    Intractable low back pain  Active Problems:    HTN (hypertension)    ASHD (arteriosclerotic heart disease)    Compression fracture of T6 vertebra (HCC)  Resolved Problems:    * No resolved hospital problems. *        Admitted for: (HPI) intractable back pain    Hospital Course: This is a pt with dementia, cad, hbp, prior kyphoplasty. She lived in assisted living. She began to complain of fatigue and back pain about a week PTA in the thoracic region. She was brought to ER and the Hospitalist Service was asked to admit the patient. She was admitted and seen by Pain Management. Dr Kaylin Ramirez saw her and did not feel she needed a kyphoplasty as she was not tender over the area where a compression fracture was noted. He did do a nerve block which seemed to help. She was given physical therapy and the rest of her stay was uneventful. Arrangements were made for her to be admitted to a Memory Unit at discharge. She will be followed by her Primary Care. Consultants:  Pain Management, IR.      Discharge Medications:       Medication List        START taking these medications      isosorbide mononitrate 30 MG extended release tablet  Commonly known as: IMDUR  Take 1 tablet by mouth nightly     QUEtiapine 25 MG tablet  Commonly known as: SEROQUEL  Take 1 tablet by mouth nightly            CHANGE how you take these medications      mirtazapine 15 MG tablet  Commonly known as: REMERON  TAKE 1/2 (ONE-HALF) TABLET BY MOUTH NIGHTLY  What changed: how much to take            CONTINUE taking these medications      acetaminophen 325 MG tablet  Commonly known as: TYLENOL     amLODIPine 5 MG tablet  Commonly known as:

## 2023-06-26 ENCOUNTER — TELEPHONE (OUTPATIENT)
Dept: FAMILY MEDICINE CLINIC | Age: 88
End: 2023-06-26

## 2023-06-27 ENCOUNTER — TELEPHONE (OUTPATIENT)
Dept: FAMILY MEDICINE CLINIC | Age: 88
End: 2023-06-27

## (undated) DEVICE — HYPODERMIC SAFETY NEEDLE: Brand: MAGELLAN

## (undated) DEVICE — NEEDLE SYR 18GA L1.5IN RED PLAS HUB S STL BLNT FILL W/O

## (undated) DEVICE — SHEET,DRAPE,3/4,53X77,STERILE: Brand: MEDLINE

## (undated) DEVICE — 6 ML SYRINGE LUER-LOCK TIP: Brand: MONOJECT

## (undated) DEVICE — SYRINGE MED 10ML LUERLOCK TIP W/O SFTY DISP

## (undated) DEVICE — GLOVE SURG SZ 65 THK91MIL LTX FREE SYN POLYISOPRENE

## (undated) DEVICE — GLOVE ORANGE PI 7 1/2   MSG9075

## (undated) DEVICE — TOWEL,OR,DSP,ST,BLUE,DLX,4/PK,20PK/CS: Brand: MEDLINE

## (undated) DEVICE — NEEDLE SPNL 22GA L3.5IN BLK HUB S STL REG WALL FIT STYL W/

## (undated) DEVICE — GAUZE,SPONGE,4"X4",12PLY,STERILE,LF,2'S: Brand: MEDLINE